# Patient Record
Sex: MALE | Race: WHITE | Employment: OTHER | URBAN - METROPOLITAN AREA
[De-identification: names, ages, dates, MRNs, and addresses within clinical notes are randomized per-mention and may not be internally consistent; named-entity substitution may affect disease eponyms.]

---

## 2018-09-03 ENCOUNTER — APPOINTMENT (OUTPATIENT)
Dept: CT IMAGING | Age: 68
End: 2018-09-03
Payer: COMMERCIAL

## 2018-09-03 ENCOUNTER — APPOINTMENT (OUTPATIENT)
Dept: GENERAL RADIOLOGY | Age: 68
End: 2018-09-03
Payer: COMMERCIAL

## 2018-09-03 ENCOUNTER — HOSPITAL ENCOUNTER (EMERGENCY)
Age: 68
Discharge: HOME | End: 2018-09-03
Attending: EMERGENCY MEDICINE
Payer: COMMERCIAL

## 2018-09-03 VITALS
HEART RATE: 73 BPM | HEIGHT: 71 IN | SYSTOLIC BLOOD PRESSURE: 150 MMHG | WEIGHT: 220 LBS | BODY MASS INDEX: 30.8 KG/M2 | OXYGEN SATURATION: 95 % | TEMPERATURE: 98.2 F | DIASTOLIC BLOOD PRESSURE: 94 MMHG | RESPIRATION RATE: 16 BRPM

## 2018-09-03 DIAGNOSIS — R55 SYNCOPE AND COLLAPSE: Primary | ICD-10-CM

## 2018-09-03 LAB
A/G RATIO: 1.2 (ref 1.1–2.2)
ALBUMIN SERPL-MCNC: 3.8 G/DL (ref 3.4–5)
ALP BLD-CCNC: 98 U/L (ref 40–129)
ALT SERPL-CCNC: 19 U/L (ref 10–40)
ANION GAP SERPL CALCULATED.3IONS-SCNC: 11 MMOL/L (ref 3–16)
AST SERPL-CCNC: 17 U/L (ref 15–37)
BASOPHILS ABSOLUTE: 0.1 K/UL (ref 0–0.2)
BASOPHILS RELATIVE PERCENT: 1 %
BILIRUB SERPL-MCNC: 0.9 MG/DL (ref 0–1)
BUN BLDV-MCNC: 10 MG/DL (ref 7–20)
CALCIUM SERPL-MCNC: 9.3 MG/DL (ref 8.3–10.6)
CHLORIDE BLD-SCNC: 101 MMOL/L (ref 99–110)
CO2: 28 MMOL/L (ref 21–32)
CREAT SERPL-MCNC: 0.9 MG/DL (ref 0.8–1.3)
EOSINOPHILS ABSOLUTE: 0.2 K/UL (ref 0–0.6)
EOSINOPHILS RELATIVE PERCENT: 1.8 %
GFR AFRICAN AMERICAN: >60
GFR NON-AFRICAN AMERICAN: >60
GLOBULIN: 3.2 G/DL
GLUCOSE BLD-MCNC: 171 MG/DL (ref 70–99)
HCT VFR BLD CALC: 39 % (ref 40.5–52.5)
HEMOGLOBIN: 13.9 G/DL (ref 13.5–17.5)
LYMPHOCYTES ABSOLUTE: 2.3 K/UL (ref 1–5.1)
LYMPHOCYTES RELATIVE PERCENT: 20 %
MCH RBC QN AUTO: 30.8 PG (ref 26–34)
MCHC RBC AUTO-ENTMCNC: 35.5 G/DL (ref 31–36)
MCV RBC AUTO: 86.8 FL (ref 80–100)
MONOCYTES ABSOLUTE: 1.1 K/UL (ref 0–1.3)
MONOCYTES RELATIVE PERCENT: 9.8 %
NEUTROPHILS ABSOLUTE: 7.7 K/UL (ref 1.7–7.7)
NEUTROPHILS RELATIVE PERCENT: 67.4 %
PDW BLD-RTO: 13 % (ref 12.4–15.4)
PLATELET # BLD: 184 K/UL (ref 135–450)
PMV BLD AUTO: 11.2 FL (ref 5–10.5)
POTASSIUM SERPL-SCNC: 3.6 MMOL/L (ref 3.5–5.1)
RBC # BLD: 4.5 M/UL (ref 4.2–5.9)
SODIUM BLD-SCNC: 140 MMOL/L (ref 136–145)
TOTAL PROTEIN: 7 G/DL (ref 6.4–8.2)
TROPONIN: <0.01 NG/ML
WBC # BLD: 11.4 K/UL (ref 4–11)

## 2018-09-03 PROCEDURE — 99284 EMERGENCY DEPT VISIT MOD MDM: CPT

## 2018-09-03 PROCEDURE — 96360 HYDRATION IV INFUSION INIT: CPT

## 2018-09-03 PROCEDURE — 70450 CT HEAD/BRAIN W/O DYE: CPT

## 2018-09-03 PROCEDURE — 80053 COMPREHEN METABOLIC PANEL: CPT

## 2018-09-03 PROCEDURE — 2580000003 HC RX 258: Performed by: PHYSICIAN ASSISTANT

## 2018-09-03 PROCEDURE — 85025 COMPLETE CBC W/AUTO DIFF WBC: CPT

## 2018-09-03 PROCEDURE — 72125 CT NECK SPINE W/O DYE: CPT

## 2018-09-03 PROCEDURE — 93005 ELECTROCARDIOGRAM TRACING: CPT | Performed by: EMERGENCY MEDICINE

## 2018-09-03 PROCEDURE — 84484 ASSAY OF TROPONIN QUANT: CPT

## 2018-09-03 PROCEDURE — 71045 X-RAY EXAM CHEST 1 VIEW: CPT

## 2018-09-03 RX ORDER — PANTOPRAZOLE SODIUM 40 MG/1
TABLET, DELAYED RELEASE ORAL
Status: ON HOLD | COMMUNITY
Start: 2018-05-14 | End: 2021-09-11 | Stop reason: SDUPTHER

## 2018-09-03 RX ORDER — ASPIRIN 81 MG/1
81 TABLET ORAL
COMMUNITY

## 2018-09-03 RX ORDER — BUDESONIDE AND FORMOTEROL FUMARATE DIHYDRATE 80; 4.5 UG/1; UG/1
1 AEROSOL RESPIRATORY (INHALATION)
COMMUNITY
Start: 2018-04-05

## 2018-09-03 RX ORDER — ATORVASTATIN CALCIUM 40 MG/1
TABLET, FILM COATED ORAL
COMMUNITY
Start: 2018-07-26

## 2018-09-03 RX ORDER — 0.9 % SODIUM CHLORIDE 0.9 %
1000 INTRAVENOUS SOLUTION INTRAVENOUS ONCE
Status: COMPLETED | OUTPATIENT
Start: 2018-09-03 | End: 2018-09-03

## 2018-09-03 RX ORDER — DULOXETIN HYDROCHLORIDE 60 MG/1
CAPSULE, DELAYED RELEASE ORAL
COMMUNITY
Start: 2018-06-18

## 2018-09-03 RX ORDER — CLOPIDOGREL BISULFATE 75 MG/1
TABLET ORAL
COMMUNITY
Start: 2018-08-20

## 2018-09-03 RX ADMIN — SODIUM CHLORIDE 1000 ML: 9 INJECTION, SOLUTION INTRAVENOUS at 19:21

## 2018-09-03 NOTE — ED PROVIDER NOTES
Emergency Department Provider Note     Location: [facility]  ED  9/3/2018    I independently performed a history and physical on Coby Richardson. All diagnostic, treatment, and disposition decisions were made by myself in conjunction with the mid-level provider. Briefly, this is a 79 y.o. male here for LOC. Patient got out of bed, opened a door to ask his wife to let the dog out. He then closed the door and next thing he knows, he was on the floor with wife/son by his side. Wife said after the patient closed the door, she heard a thud. She immediately rushed in and found the patient on the floor unresponsive. She shook the patient to wake him up, ran to get her son to help her and when they returned within a few seconds, the patient was already sitting up on the floor but still acting somewhat confused. He was also noted to be pale, cool, and clammy. Denies HA, vision changes. No N/V. Patient refused to come initially. However, he remained tired throughout the day and wife convinced him to come. ED Triage Vitals [09/03/18 1622]   BP Temp Temp Source Pulse Resp SpO2 Height Weight   133/83 99.1 °F (37.3 °C) Oral 98 20 94 % 5' 11\" (1.803 m) 220 lb (99.8 kg)        Exam showed WDWN M, NAD, ACOx3, heart RRR, lungs clear, abd soft. Orthostatic VS obtained. Supine /91, HR 77. Sitting /93, HR 92. Standing /87, HR 95    Fluids ordered. EKG  The Ekg interpreted by me in the absence of a cardiologist shows. normal sinus rhythm with a rate of 81  Axis is   Normal  QTc is  normal  Intervals and Durations are unremarkable. No specific ST-T wave changes appreciated. No evidence of acute ischemia. No significant change from prior EKG dated 2/22/12     Lab reviewed - nonspecific elevated WBC at 11.4. Glucose 171. No acute abnormality that needs to be addressed. Imaging reviewed. 1. No acute abnormalities seen in the brain or cervical spine   2.  Focal area of

## 2018-09-03 NOTE — ED PROVIDER NOTES
History reviewed. No pertinent family history. Social History     Social History    Marital status:      Spouse name: N/A    Number of children: N/A    Years of education: N/A     Occupational History    Not on file. Social History Main Topics    Smoking status: Current Every Day Smoker     Packs/day: 0.50     Types: Cigarettes    Smokeless tobacco: Never Used    Alcohol use No    Drug use: No    Sexual activity: Not on file     Other Topics Concern    Not on file     Social History Narrative    No narrative on file     No current facility-administered medications for this encounter. Current Outpatient Prescriptions   Medication Sig Dispense Refill    aspirin 81 MG EC tablet Take 81 mg by mouth      atorvastatin (LIPITOR) 40 MG tablet TAKE 1 TABLET BY MOUTH DAILY.  clopidogrel (PLAVIX) 75 MG tablet TAKE 1 TAB BY MOUTH DAILY.  budesonide-formoterol (SYMBICORT) 80-4.5 MCG/ACT AERO Inhale 1 puff into the lungs      DULoxetine (CYMBALTA) 60 MG extended release capsule TAKE ONE CAPSULE BY MOUTH EVERY DAY      Multiple Vitamins-Minerals (MULTIVITAMIN ADULT EXTRA C PO) Take by mouth      pantoprazole (PROTONIX) 40 MG tablet TAKE 1 TAB BY MOUTH DAILY.  naproxen (NAPROSYN) 500 MG tablet Take 1 tablet by mouth 2 times daily. 20 tablet 0     No Known Allergies    REVIEW OF SYSTEMS  10 systems reviewed, pertinent positives per HPI otherwise noted to be negative    PHYSICAL EXAM  /83   Pulse 98   Temp 99.1 °F (37.3 °C) (Oral)   Resp 20   Ht 5' 11\" (1.803 m)   Wt 220 lb (99.8 kg)   SpO2 94%   BMI 30.68 kg/m²   GENERAL APPEARANCE: Awake and alert. Cooperative. No acute distress. HEAD: Normocephalic. Atraumatic. No hematomas, lesions, lacerations or bruises. Negative for gilman signs or raccoons eyes. EYES: PERRL. EOM's grossly intact. Conjunctiva clear without exudate. No periorbital edema or erythema. No proptosis. No globe tenderness.   No blood noted to Type of Exam: Initial; ORDERING SYSTEM PROVIDED HISTORY: loc TECHNOLOGIST PROVIDED HISTORY: Ordering Physician Provided Reason for Exam: passed out Acuity: Acute Type of Exam: Initial FINDINGS: BRAIN/VENTRICLES: Focal low-attenuation seen superiorly in the left frontal lobe. The left frontal horn is enlarged and this most likely represents an area of encephalomalacia. No acute intracranial hemorrhage. The ventricles and sulci otherwise are mildly enlarged throughout. ORBITS: The visualized portion of the orbits demonstrate no acute abnormality. SINUSES: The visualized paranasal sinuses and mastoid air cells demonstrate no acute abnormality. SOFT TISSUES/SKULL:  No acute abnormality of the visualized skull or soft tissues. Cervical spine: No fracture identified. Multilevel degenerative changes. Anatomic alignment. No surrounding soft tissue abnormality. 1. No acute abnormalities seen in the brain or cervical spine 2. 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 3. Multilevel degenerative changes seen in the cervical spine     Ct Cervical Spine Wo Contrast    Result Date: 9/3/2018  EXAMINATION: CT OF THE HEAD WITHOUT CONTRAST; CT OF THE CERVICAL SPINE WITHOUT CONTRAST 9/3/2018 5:13 pm; 9/3/2018 5:12 pm TECHNIQUE: CT of the head was performed without the administration of intravenous contrast. Dose modulation, iterative reconstruction, and/or weight based adjustment of the mA/kV was utilized to reduce the radiation dose to as low as reasonably achievable.; CT of the cervical spine was performed without the administration of intravenous contrast. Multiplanar reformatted images are provided for review. Dose modulation, iterative reconstruction, and/or weight based adjustment of the mA/kV was utilized to reduce the radiation dose to as low as reasonably achievable. COMPARISON: None. Neutrophils # 7.7 1.7 - 7.7 K/uL    Lymphocytes # 2.3 1.0 - 5.1 K/uL    Monocytes # 1.1 0.0 - 1.3 K/uL    Eosinophils # 0.2 0.0 - 0.6 K/uL    Basophils # 0.1 0.0 - 0.2 K/uL   Comprehensive metabolic panel   Result Value Ref Range    Sodium 140 136 - 145 mmol/L    Potassium 3.6 3.5 - 5.1 mmol/L    Chloride 101 99 - 110 mmol/L    CO2 28 21 - 32 mmol/L    Anion Gap 11 3 - 16    Glucose 171 (H) 70 - 99 mg/dL    BUN 10 7 - 20 mg/dL    CREATININE 0.9 0.8 - 1.3 mg/dL    GFR Non-African American >60 >60    GFR African American >60 >60    Calcium 9.3 8.3 - 10.6 mg/dL    Total Protein 7.0 6.4 - 8.2 g/dL    Alb 3.8 3.4 - 5.0 g/dL    Albumin/Globulin Ratio 1.2 1.1 - 2.2    Total Bilirubin 0.9 0.0 - 1.0 mg/dL    Alkaline Phosphatase 98 40 - 129 U/L    ALT 19 10 - 40 U/L    AST 17 15 - 37 U/L    Globulin 3.2 g/dL   Troponin   Result Value Ref Range    Troponin <0.01 <0.01 ng/mL   EKG 12 Lead   Result Value Ref Range    Ventricular Rate 81 BPM    Atrial Rate 81 BPM    P-R Interval 162 ms    QRS Duration 94 ms    Q-T Interval 370 ms    QTc Calculation (Bazett) 429 ms    P Axis 54 degrees    R Axis 9 degrees    T Axis 49 degrees    Diagnosis       Normal sinus rhythmNormal ECGWhen compared with ECG of 22-FEB-2012 17:48,No significant change was found     I estimate there is LOW risk for ACUTE CORONARY SYNDROME, INTRACRANIAL HEMORRHAGE, MALIGNANT DYSRHYTHMIA, MENINGITIS, PNEUMONIA, PULMONARY EMBOLISM, SEPSIS, SUBARACHNOID HEMORRHAGE, SUBDURAL HEMATOMA, STROKE, or URINARY TRACT INFECTION, thus I consider the discharge disposition reasonable. Elizabeth Yoo and I have discussed the diagnosis and risks, and we agree with discharging home to follow-up with their primary doctor. We also discussed returning to the Emergency Department immediately if new or worsening symptoms occur.  We have discussed the symptoms which are most concerning (e.g., changing or worsening pain, weakness, vomiting, fever) that necessitate immediate return. Final Impression  1. Syncope and collapse      Blood pressure (!) 140/82, pulse 82, temperature 99.1 °F (37.3 °C), temperature source Oral, resp. rate 18, height 5' 11\" (1.803 m), weight 220 lb (99.8 kg), SpO2 95 %.     DISPOSITION  Patient was discharged to home in good condition.  ;       Gracia Schaeffer  09/03/18 2017

## 2018-09-04 LAB
EKG ATRIAL RATE: 81 BPM
EKG DIAGNOSIS: NORMAL
EKG P AXIS: 54 DEGREES
EKG P-R INTERVAL: 162 MS
EKG Q-T INTERVAL: 370 MS
EKG QRS DURATION: 94 MS
EKG QTC CALCULATION (BAZETT): 429 MS
EKG R AXIS: 9 DEGREES
EKG T AXIS: 49 DEGREES
EKG VENTRICULAR RATE: 81 BPM

## 2018-09-04 PROCEDURE — 93010 ELECTROCARDIOGRAM REPORT: CPT | Performed by: INTERNAL MEDICINE

## 2019-08-22 ENCOUNTER — HOSPITAL ENCOUNTER (EMERGENCY)
Age: 69
Discharge: HOME | End: 2019-08-22
Attending: EMERGENCY MEDICINE
Payer: MEDICARE

## 2019-08-22 ENCOUNTER — APPOINTMENT (OUTPATIENT)
Dept: GENERAL RADIOLOGY | Age: 69
End: 2019-08-22
Payer: MEDICARE

## 2019-08-22 VITALS
OXYGEN SATURATION: 98 % | HEART RATE: 86 BPM | SYSTOLIC BLOOD PRESSURE: 106 MMHG | DIASTOLIC BLOOD PRESSURE: 78 MMHG | BODY MASS INDEX: 33.6 KG/M2 | HEIGHT: 71 IN | WEIGHT: 240 LBS | TEMPERATURE: 98.3 F | RESPIRATION RATE: 16 BRPM

## 2019-08-22 DIAGNOSIS — R42 ORTHOSTATIC DIZZINESS: Primary | ICD-10-CM

## 2019-08-22 LAB
A/G RATIO: 1.1 (ref 1.1–2.2)
ALBUMIN SERPL-MCNC: 3.9 G/DL (ref 3.4–5)
ALP BLD-CCNC: 94 U/L (ref 40–129)
ALT SERPL-CCNC: 17 U/L (ref 10–40)
ANION GAP SERPL CALCULATED.3IONS-SCNC: 11 MMOL/L (ref 3–16)
AST SERPL-CCNC: 22 U/L (ref 15–37)
BASOPHILS ABSOLUTE: 0.1 K/UL (ref 0–0.2)
BASOPHILS RELATIVE PERCENT: 0.7 %
BILIRUB SERPL-MCNC: 1.1 MG/DL (ref 0–1)
BILIRUBIN URINE: NEGATIVE
BLOOD, URINE: NEGATIVE
BUN BLDV-MCNC: 10 MG/DL (ref 7–20)
CALCIUM SERPL-MCNC: 9.1 MG/DL (ref 8.3–10.6)
CHLORIDE BLD-SCNC: 104 MMOL/L (ref 99–110)
CLARITY: CLEAR
CO2: 26 MMOL/L (ref 21–32)
COLOR: YELLOW
CREAT SERPL-MCNC: 1.2 MG/DL (ref 0.8–1.3)
EKG ATRIAL RATE: 87 BPM
EKG DIAGNOSIS: NORMAL
EKG P AXIS: 58 DEGREES
EKG P-R INTERVAL: 162 MS
EKG Q-T INTERVAL: 376 MS
EKG QRS DURATION: 94 MS
EKG QTC CALCULATION (BAZETT): 452 MS
EKG R AXIS: 13 DEGREES
EKG T AXIS: 47 DEGREES
EKG VENTRICULAR RATE: 87 BPM
EOSINOPHILS ABSOLUTE: 0.1 K/UL (ref 0–0.6)
EOSINOPHILS RELATIVE PERCENT: 0.9 %
GFR AFRICAN AMERICAN: >60
GFR NON-AFRICAN AMERICAN: >60
GLOBULIN: 3.4 G/DL
GLUCOSE BLD-MCNC: 198 MG/DL (ref 70–99)
GLUCOSE URINE: 250 MG/DL
HCT VFR BLD CALC: 39.9 % (ref 40.5–52.5)
HEMOGLOBIN: 13.7 G/DL (ref 13.5–17.5)
KETONES, URINE: NEGATIVE MG/DL
LEUKOCYTE ESTERASE, URINE: NEGATIVE
LYMPHOCYTES ABSOLUTE: 1.4 K/UL (ref 1–5.1)
LYMPHOCYTES RELATIVE PERCENT: 10.3 %
MCH RBC QN AUTO: 29.9 PG (ref 26–34)
MCHC RBC AUTO-ENTMCNC: 34.4 G/DL (ref 31–36)
MCV RBC AUTO: 87.1 FL (ref 80–100)
MICROSCOPIC EXAMINATION: ABNORMAL
MONOCYTES ABSOLUTE: 0.9 K/UL (ref 0–1.3)
MONOCYTES RELATIVE PERCENT: 6.3 %
NEUTROPHILS ABSOLUTE: 11.4 K/UL (ref 1.7–7.7)
NEUTROPHILS RELATIVE PERCENT: 81.8 %
NITRITE, URINE: NEGATIVE
PDW BLD-RTO: 13.3 % (ref 12.4–15.4)
PH UA: 6 (ref 5–8)
PLATELET # BLD: 179 K/UL (ref 135–450)
PMV BLD AUTO: 11.6 FL (ref 5–10.5)
POTASSIUM SERPL-SCNC: 3.9 MMOL/L (ref 3.5–5.1)
PROTEIN UA: NEGATIVE MG/DL
RBC # BLD: 4.58 M/UL (ref 4.2–5.9)
SODIUM BLD-SCNC: 141 MMOL/L (ref 136–145)
SPECIFIC GRAVITY UA: 1.02 (ref 1–1.03)
TOTAL PROTEIN: 7.3 G/DL (ref 6.4–8.2)
TROPONIN: <0.01 NG/ML
URINE REFLEX TO CULTURE: ABNORMAL
URINE TYPE: ABNORMAL
UROBILINOGEN, URINE: 2 E.U./DL
WBC # BLD: 13.9 K/UL (ref 4–11)

## 2019-08-22 PROCEDURE — 93010 ELECTROCARDIOGRAM REPORT: CPT | Performed by: INTERNAL MEDICINE

## 2019-08-22 PROCEDURE — 2580000003 HC RX 258: Performed by: PHYSICIAN ASSISTANT

## 2019-08-22 PROCEDURE — 99284 EMERGENCY DEPT VISIT MOD MDM: CPT

## 2019-08-22 PROCEDURE — 96361 HYDRATE IV INFUSION ADD-ON: CPT

## 2019-08-22 PROCEDURE — 96374 THER/PROPH/DIAG INJ IV PUSH: CPT

## 2019-08-22 PROCEDURE — 93005 ELECTROCARDIOGRAM TRACING: CPT | Performed by: EMERGENCY MEDICINE

## 2019-08-22 PROCEDURE — 85025 COMPLETE CBC W/AUTO DIFF WBC: CPT

## 2019-08-22 PROCEDURE — 84484 ASSAY OF TROPONIN QUANT: CPT

## 2019-08-22 PROCEDURE — 81003 URINALYSIS AUTO W/O SCOPE: CPT

## 2019-08-22 PROCEDURE — 6360000002 HC RX W HCPCS: Performed by: PHYSICIAN ASSISTANT

## 2019-08-22 PROCEDURE — 80053 COMPREHEN METABOLIC PANEL: CPT

## 2019-08-22 PROCEDURE — 71046 X-RAY EXAM CHEST 2 VIEWS: CPT

## 2019-08-22 RX ORDER — ONDANSETRON 2 MG/ML
4 INJECTION INTRAMUSCULAR; INTRAVENOUS EVERY 6 HOURS PRN
Status: DISCONTINUED | OUTPATIENT
Start: 2019-08-22 | End: 2019-08-22 | Stop reason: HOSPADM

## 2019-08-22 RX ORDER — 0.9 % SODIUM CHLORIDE 0.9 %
1000 INTRAVENOUS SOLUTION INTRAVENOUS ONCE
Status: COMPLETED | OUTPATIENT
Start: 2019-08-22 | End: 2019-08-22

## 2019-08-22 RX ORDER — ONDANSETRON 4 MG/1
4 TABLET, FILM COATED ORAL EVERY 8 HOURS PRN
Qty: 20 TABLET | Refills: 0 | Status: SHIPPED | OUTPATIENT
Start: 2019-08-22

## 2019-08-22 RX ADMIN — SODIUM CHLORIDE 1000 ML: 9 INJECTION, SOLUTION INTRAVENOUS at 17:30

## 2019-08-22 RX ADMIN — ONDANSETRON 4 MG: 2 INJECTION INTRAMUSCULAR; INTRAVENOUS at 17:30

## 2019-08-22 ASSESSMENT — ENCOUNTER SYMPTOMS
NAUSEA: 1
RESPIRATORY NEGATIVE: 1

## 2019-08-22 NOTE — ED PROVIDER NOTES
[address] ED  EMERGENCY DEPARTMENT ENCOUNTER        Pt Name: Kailyn Ramires  MRN: [id number]  [place] 1950  Date of evaluation: 8/22/2019  Provider: Mayela Cooper PA-C  PCP: [address]    This patient was seen and evaluated by the attending physician Dr. Alex Johnston. CHIEF COMPLAINT       Chief Complaint   Patient presents with    Dizziness     pt states had an episode of nausea dizziness and sweating that lasted about an hour, currently feeling a little lightheaded       HISTORY OF PRESENT ILLNESS   (Location/Symptom, Timing/Onset, Context/Setting, Quality, Duration, Modifying Factors, Severity)  Note limiting factors. Kailyn Ramires is a 81 y.o. male brought in by private vehicle for complaints of dizziness which he describes as feeling lightheaded. He also admits to nausea and diaphoresis. Onset was earlier this afternoon. Symptoms were intermittent. Denies any chest pain or shortness of breath. He states since he has arrived to the ER symptoms have resolved. No other aggravating or alleviating symptoms. Patient denies any other complaints at this time. Denies any numbness or tingling to his lower extremities. Denies fevers chills or abdominal pain. He has not tried anything at this time for symptomatic relief. Nursing Notes were all reviewed and agreed with or any disagreements were addressed  in the HPI. REVIEW OF SYSTEMS    (2-9 systems for level 4, 10 or more for level 5)     Review of Systems   Constitutional: Negative. HENT: Negative. Respiratory: Negative. Cardiovascular: Negative. Gastrointestinal: Positive for nausea. Genitourinary: Negative. Musculoskeletal: Negative. Skin: Negative. Neurological: Positive for dizziness and light-headedness. Positives and Pertinent negatives as per HPI. Except as noted abovein the ROS, all other systems were reviewed and negative.        PAST MEDICAL HISTORY     Past Laboratory  Genevieve Friedman,  [place], [facility]   Phone [phone]   TROPONIN    Narrative:     Performed at:  [facility]) - [facility]  [address]evieve Friedman,  [place]   Phone [phone]       All other labs were within normal range or not returned as of this dictation. EKG: All EKG's are interpreted by the Emergency Department Physician in the absence of a cardiologist.  Please see their note for interpretation of EKG. RADIOLOGY:   Non-plain film images such as CT, Ultrasound and MRI are read by the radiologist. Plain radiographic images are visualized andpreliminarily interpreted by the  ED Provider with the below findings:        Interpretation [facility] Radiologist below, if available at the time of this note:    XR CHEST STANDARD (2 VW)   Final Result   No evidence of acute cardiopulmonary disease. No results found. PROCEDURES   Unless otherwise noted below, none     Procedures    CRITICAL CARE TIME   N/A    CONSULTS:  None      EMERGENCY DEPARTMENT COURSE and DIFFERENTIAL DIAGNOSIS/MDM:   Vitals:    Vitals:    08/22/19 1613 08/22/19 1656 08/22/19 1826   BP: 133/83  106/78   Pulse: 92  86   Resp: 16 16 16   Temp: 98.3 °F (36.8 °C)     TempSrc: Temporal     SpO2: 98%  98%   Weight: 240 lb (108.9 kg)     Height: 5' 11\" (1.803 m)         Patient was given thefollowing medications:  Medications   ondansetron (ZOFRAN) injection 4 mg (4 mg Intravenous Given 8/22/19 1730)   0.9 % sodium chloride bolus (0 mLs Intravenous Stopped 8/22/19 1919)       Patient brought in for evaluation of dizziness which he described as feeling lightheaded. He also admits to some nausea and diaphoresis. He denies any chest pain. On exam he is alert oriented afebrile breathing on room air satting at 98%. Appears nontoxic no respiratory distress. Old labs and records reviewed. No electrolyte abnormalities. Patient did have a white count of 13.9.   Troponin was less than

## 2019-08-22 NOTE — ED TRIAGE NOTES
Chief Complaint   Patient presents with    Dizziness     pt states had an episode of nausea dizziness and sweating that lasted about an hour, currently feeling a little lightheaded

## 2020-01-21 ENCOUNTER — HOSPITAL ENCOUNTER (EMERGENCY)
Age: 70
Discharge: HOME | End: 2020-01-21
Attending: EMERGENCY MEDICINE
Payer: MEDICARE

## 2020-01-21 VITALS
BODY MASS INDEX: 31.5 KG/M2 | OXYGEN SATURATION: 100 % | WEIGHT: 220 LBS | TEMPERATURE: 97.7 F | DIASTOLIC BLOOD PRESSURE: 64 MMHG | HEART RATE: 90 BPM | RESPIRATION RATE: 14 BRPM | HEIGHT: 70 IN | SYSTOLIC BLOOD PRESSURE: 124 MMHG

## 2020-01-21 LAB
A/G RATIO: 1.5 (ref 1.1–2.2)
ALBUMIN SERPL-MCNC: 4 G/DL (ref 3.4–5)
ALP BLD-CCNC: 86 U/L (ref 40–129)
ALT SERPL-CCNC: 15 U/L (ref 10–40)
ANION GAP SERPL CALCULATED.3IONS-SCNC: 10 MMOL/L (ref 3–16)
AST SERPL-CCNC: 17 U/L (ref 15–37)
BASOPHILS ABSOLUTE: 0.1 K/UL (ref 0–0.2)
BASOPHILS RELATIVE PERCENT: 0.6 %
BILIRUB SERPL-MCNC: 0.4 MG/DL (ref 0–1)
BILIRUBIN URINE: NEGATIVE
BLOOD, URINE: ABNORMAL
BUN BLDV-MCNC: 15 MG/DL (ref 7–20)
CALCIUM SERPL-MCNC: 8.8 MG/DL (ref 8.3–10.6)
CHLORIDE BLD-SCNC: 102 MMOL/L (ref 99–110)
CLARITY: ABNORMAL
CO2: 26 MMOL/L (ref 21–32)
COLOR: YELLOW
CREAT SERPL-MCNC: 1.2 MG/DL (ref 0.8–1.3)
EOSINOPHILS ABSOLUTE: 0 K/UL (ref 0–0.6)
EOSINOPHILS RELATIVE PERCENT: 0.5 %
GFR AFRICAN AMERICAN: >60
GFR NON-AFRICAN AMERICAN: >60
GLOBULIN: 2.7 G/DL
GLUCOSE BLD-MCNC: 198 MG/DL (ref 70–99)
GLUCOSE URINE: 500 MG/DL
HCT VFR BLD CALC: 32.6 % (ref 40.5–52.5)
HEMOGLOBIN: 10.8 G/DL (ref 13.5–17.5)
KETONES, URINE: NEGATIVE MG/DL
LEUKOCYTE ESTERASE, URINE: NEGATIVE
LIPASE: 37 U/L (ref 13–60)
LYMPHOCYTES ABSOLUTE: 1 K/UL (ref 1–5.1)
LYMPHOCYTES RELATIVE PERCENT: 9.9 %
MCH RBC QN AUTO: 28.3 PG (ref 26–34)
MCHC RBC AUTO-ENTMCNC: 33.2 G/DL (ref 31–36)
MCV RBC AUTO: 85.1 FL (ref 80–100)
MICROSCOPIC EXAMINATION: YES
MONOCYTES ABSOLUTE: 0.5 K/UL (ref 0–1.3)
MONOCYTES RELATIVE PERCENT: 5.4 %
NEUTROPHILS ABSOLUTE: 8.3 K/UL (ref 1.7–7.7)
NEUTROPHILS RELATIVE PERCENT: 83.6 %
NITRITE, URINE: NEGATIVE
PDW BLD-RTO: 13.5 % (ref 12.4–15.4)
PH UA: 6.5 (ref 5–8)
PLATELET # BLD: 182 K/UL (ref 135–450)
PMV BLD AUTO: 11.5 FL (ref 5–10.5)
POTASSIUM SERPL-SCNC: 4.3 MMOL/L (ref 3.5–5.1)
PROTEIN UA: NEGATIVE MG/DL
RBC # BLD: 3.83 M/UL (ref 4.2–5.9)
RBC UA: >100 /HPF (ref 0–2)
SODIUM BLD-SCNC: 138 MMOL/L (ref 136–145)
SPECIFIC GRAVITY UA: 1.01 (ref 1–1.03)
TOTAL PROTEIN: 6.7 G/DL (ref 6.4–8.2)
URINE REFLEX TO CULTURE: ABNORMAL
URINE TYPE: ABNORMAL
UROBILINOGEN, URINE: 1 E.U./DL
WBC # BLD: 9.9 K/UL (ref 4–11)
WBC UA: ABNORMAL /HPF (ref 0–5)

## 2020-01-21 PROCEDURE — 81001 URINALYSIS AUTO W/SCOPE: CPT

## 2020-01-21 PROCEDURE — 2580000003 HC RX 258: Performed by: EMERGENCY MEDICINE

## 2020-01-21 PROCEDURE — 99284 EMERGENCY DEPT VISIT MOD MDM: CPT

## 2020-01-21 PROCEDURE — 85025 COMPLETE CBC W/AUTO DIFF WBC: CPT

## 2020-01-21 PROCEDURE — 80053 COMPREHEN METABOLIC PANEL: CPT

## 2020-01-21 PROCEDURE — 83690 ASSAY OF LIPASE: CPT

## 2020-01-21 RX ORDER — 0.9 % SODIUM CHLORIDE 0.9 %
1000 INTRAVENOUS SOLUTION INTRAVENOUS ONCE
Status: COMPLETED | OUTPATIENT
Start: 2020-01-21 | End: 2020-01-21

## 2020-01-21 RX ADMIN — SODIUM CHLORIDE 1000 ML: 9 INJECTION, SOLUTION INTRAVENOUS at 10:33

## 2020-01-21 ASSESSMENT — ENCOUNTER SYMPTOMS
HEMATOCHEZIA: 0
ABDOMINAL PAIN: 1
DIARRHEA: 1
BLOOD IN STOOL: 0
SHORTNESS OF BREATH: 0
NAUSEA: 1
VOICE CHANGE: 0
HEMATEMESIS: 0
TROUBLE SWALLOWING: 0
WHEEZING: 0
BACK PAIN: 0
STRIDOR: 0
VOMITING: 1
FACIAL SWELLING: 0
COLOR CHANGE: 0
PHOTOPHOBIA: 0

## 2020-01-21 NOTE — ED NOTES
Discharge instructions reviewed with pt. He verbalized understanding. Pt ambulated out of ED in stable condition.       [facility]uman[facility] [address]  01/21/20 1213

## 2020-01-21 NOTE — ED PROVIDER NOTES
Day Smoker     Packs/day: 0.50     Types: Cigarettes    Smokeless tobacco: Never Used   Substance and Sexual Activity    Alcohol use: Yes     Comment: occ     Drug use: No    Sexual activity: None   Lifestyle    Physical activity:     Days per week: None     Minutes per session: None    Stress: None   Relationships    Social connections:     Talks on phone: None     Gets together: None     Attends Anabaptist service: None     Active member of club or organization: None     Attends meetings of clubs or organizations: None     Relationship status: None    Intimate partner violence:     Fear of current or ex partner: None     Emotionally abused: None     Physically abused: None     Forced sexual activity: None   Other Topics Concern    None   Social History Narrative    None         PHYSICAL EXAM    (up to 7 for level 4, 8 or more for level 5)     ED Triage Vitals   BP Temp Temp Source Pulse Resp SpO2 Height Weight   01/21/20 0942 01/21/20 0942 01/21/20 0942 01/21/20 0942 01/21/20 0942 01/21/20 0942 01/21/20 0941 01/21/20 0941   (!) 140/84 97.7 °F (36.5 °C) Oral 81 14 99 % 5' 10\" (1.778 m) 220 lb (99.8 kg)       Physical Exam  Vitals signs and nursing note reviewed. Constitutional:       General: He is not in acute distress. Appearance: He is well-developed. HENT:      Head: Normocephalic and atraumatic. Right Ear: External ear normal.      Left Ear: External ear normal.   Eyes:      Conjunctiva/sclera: Conjunctivae normal.   Neck:      Musculoskeletal: Neck supple. Vascular: No JVD. Trachea: No tracheal deviation. Cardiovascular:      Rate and Rhythm: Normal rate. Pulmonary:      Effort: Pulmonary effort is normal. No respiratory distress. Breath sounds: Normal breath sounds. No wheezing. Abdominal:      General: Abdomen is flat. Bowel sounds are normal. There is no distension. Palpations: Abdomen is soft. Tenderness: There is no tenderness.  There is no right CVA tenderness, guarding or rebound. Comments: No abdominal tenderness to palpation, no CVA tenderness to percussion. Musculoskeletal: Normal range of motion. General: No tenderness. Skin:     General: Skin is warm and dry. Neurological:      Mental Status: He is alert. Cranial Nerves: No cranial nerve deficit. DIAGNOSTIC RESULTS       LABS:  Labs Reviewed   CBC WITH AUTO DIFFERENTIAL - Abnormal; Notable for the following components:       Result Value    RBC 3.83 (*)     Hemoglobin 10.8 (*)     Hematocrit 32.6 (*)     MPV 11.5 (*)     Neutrophils Absolute 8.3 (*)     All other components within normal limits    Narrative:     Performed at:  [facility],  [place], [place]   Phone [phone]   COMPREHENSIVE METABOLIC PANEL - Abnormal; Notable for the following components:    Glucose 198 (*)     All other components within normal limits    Narrative:     Performed at:  [facility],  [place], [place]   Phone [phone]   URINE RT REFLEX TO CULTURE - Abnormal; Notable for the following components:    Clarity, UA SL CLOUDY (*)     Glucose, Ur 500 (*)     Blood, Urine LARGE (*)     All other components within normal limits    Narrative:     Performed at:  [facility],  [place], [place]   Phone [phone]   MICROSCOPIC URINALYSIS - Abnormal; Notable for the following components:    RBC, UA >100 (*)     All other components within normal limits    Narrative:     Performed at:  [facility],  [place], [place]   Phone [phone]   LIPASE    Narrative:     Performed at:  [facility] - [facility],  [place], [place]   Phone [phone]       All otherlabs were within normal range or not returned as of this dictation.     EMERGENCY Erica    In 3 days      [place] [facility]  [address] [facility]  [phone]    If symptoms worsen        (Please note that portions of this note were completed with a voice recognition program.  Efforts were made to edit the dictations but occasionally words aremis-transcribed. )    Kathryn Sweeney MD (electronically signed)  Attending Emergency Physician       Kathryn Sweeney MD  01/21/20 0956

## 2020-07-10 ENCOUNTER — HOSPITAL ENCOUNTER (OUTPATIENT)
Dept: CT IMAGING | Age: 70
Discharge: HOME | End: 2020-07-10
Payer: MEDICARE

## 2020-07-10 ENCOUNTER — HOSPITAL ENCOUNTER (OUTPATIENT)
Age: 70
Discharge: HOME | End: 2020-07-10
Payer: MEDICARE

## 2020-07-10 LAB
ANION GAP SERPL CALCULATED.3IONS-SCNC: 9 MMOL/L (ref 3–16)
BUN BLDV-MCNC: 15 MG/DL (ref 7–20)
CALCIUM SERPL-MCNC: 9 MG/DL (ref 8.3–10.6)
CHLORIDE BLD-SCNC: 105 MMOL/L (ref 99–110)
CO2: 25 MMOL/L (ref 21–32)
CREAT SERPL-MCNC: 1.1 MG/DL (ref 0.8–1.3)
GFR AFRICAN AMERICAN: >60
GFR NON-AFRICAN AMERICAN: >60
GLUCOSE BLD-MCNC: 195 MG/DL (ref 70–99)
POTASSIUM SERPL-SCNC: 4 MMOL/L (ref 3.5–5.1)
SODIUM BLD-SCNC: 139 MMOL/L (ref 136–145)

## 2020-07-10 PROCEDURE — 71260 CT THORAX DX C+: CPT

## 2020-07-10 PROCEDURE — 6360000004 HC RX CONTRAST MEDICATION: Performed by: INTERNAL MEDICINE

## 2020-07-10 PROCEDURE — 80048 BASIC METABOLIC PNL TOTAL CA: CPT

## 2020-07-10 PROCEDURE — 36415 COLL VENOUS BLD VENIPUNCTURE: CPT

## 2020-07-10 PROCEDURE — 74178 CT ABD&PLV WO CNTR FLWD CNTR: CPT

## 2020-07-10 RX ADMIN — IOHEXOL 50 ML: 240 INJECTION, SOLUTION INTRATHECAL; INTRAVASCULAR; INTRAVENOUS; ORAL at 15:04

## 2020-07-10 RX ADMIN — IOPAMIDOL 75 ML: 755 INJECTION, SOLUTION INTRAVENOUS at 15:05

## 2020-10-16 ENCOUNTER — HOSPITAL ENCOUNTER (EMERGENCY)
Age: 70
Discharge: HOME | End: 2020-10-16
Payer: MEDICARE

## 2020-10-16 VITALS
HEART RATE: 78 BPM | OXYGEN SATURATION: 98 % | DIASTOLIC BLOOD PRESSURE: 70 MMHG | RESPIRATION RATE: 16 BRPM | TEMPERATURE: 98.3 F | SYSTOLIC BLOOD PRESSURE: 142 MMHG

## 2020-10-16 LAB
A/G RATIO: 1.6 (ref 1.1–2.2)
ALBUMIN SERPL-MCNC: 4.4 G/DL (ref 3.4–5)
ALP BLD-CCNC: 107 U/L (ref 40–129)
ALT SERPL-CCNC: 40 U/L (ref 10–40)
ANION GAP SERPL CALCULATED.3IONS-SCNC: 8 MMOL/L (ref 3–16)
AST SERPL-CCNC: 35 U/L (ref 15–37)
BASOPHILS ABSOLUTE: 0 K/UL (ref 0–0.2)
BASOPHILS RELATIVE PERCENT: 0.3 %
BILIRUB SERPL-MCNC: 0.6 MG/DL (ref 0–1)
BILIRUBIN URINE: NEGATIVE
BLOOD, URINE: ABNORMAL
BUN BLDV-MCNC: 12 MG/DL (ref 7–20)
CALCIUM SERPL-MCNC: 9.1 MG/DL (ref 8.3–10.6)
CHLORIDE BLD-SCNC: 102 MMOL/L (ref 99–110)
CLARITY: CLEAR
CO2: 26 MMOL/L (ref 21–32)
COLOR: YELLOW
CREAT SERPL-MCNC: 1.5 MG/DL (ref 0.8–1.3)
CRYSTALS, UA: ABNORMAL /HPF
EOSINOPHILS ABSOLUTE: 0 K/UL (ref 0–0.6)
EOSINOPHILS RELATIVE PERCENT: 0.3 %
GFR AFRICAN AMERICAN: 56
GFR NON-AFRICAN AMERICAN: 46
GLOBULIN: 2.7 G/DL
GLUCOSE BLD-MCNC: 164 MG/DL (ref 70–99)
GLUCOSE URINE: 100 MG/DL
HCT VFR BLD CALC: 33.4 % (ref 40.5–52.5)
HEMOGLOBIN: 11.1 G/DL (ref 13.5–17.5)
HYPOCHROMIA: ABNORMAL
KETONES, URINE: NEGATIVE MG/DL
LEUKOCYTE ESTERASE, URINE: NEGATIVE
LYMPHOCYTES ABSOLUTE: 1.1 K/UL (ref 1–5.1)
LYMPHOCYTES RELATIVE PERCENT: 11.3 %
MAGNESIUM: 1.6 MG/DL (ref 1.8–2.4)
MCH RBC QN AUTO: 26.1 PG (ref 26–34)
MCHC RBC AUTO-ENTMCNC: 33.2 G/DL (ref 31–36)
MCV RBC AUTO: 78.7 FL (ref 80–100)
MICROCYTES: ABNORMAL
MICROSCOPIC EXAMINATION: YES
MONOCYTES ABSOLUTE: 0.7 K/UL (ref 0–1.3)
MONOCYTES RELATIVE PERCENT: 7.1 %
NEUTROPHILS ABSOLUTE: 7.9 K/UL (ref 1.7–7.7)
NEUTROPHILS RELATIVE PERCENT: 81 %
NITRITE, URINE: NEGATIVE
PDW BLD-RTO: 19.8 % (ref 12.4–15.4)
PH UA: 5.5 (ref 5–8)
PLATELET # BLD: 175 K/UL (ref 135–450)
PMV BLD AUTO: 10.5 FL (ref 5–10.5)
POTASSIUM REFLEX MAGNESIUM: 3.4 MMOL/L (ref 3.5–5.1)
PROTEIN UA: NEGATIVE MG/DL
RBC # BLD: 4.24 M/UL (ref 4.2–5.9)
RBC UA: ABNORMAL /HPF (ref 0–4)
SODIUM BLD-SCNC: 136 MMOL/L (ref 136–145)
SPECIFIC GRAVITY UA: 1.01 (ref 1–1.03)
TOTAL PROTEIN: 7.1 G/DL (ref 6.4–8.2)
URINE REFLEX TO CULTURE: ABNORMAL
URINE TYPE: ABNORMAL
UROBILINOGEN, URINE: 0.2 E.U./DL
WBC # BLD: 9.7 K/UL (ref 4–11)
WBC UA: ABNORMAL /HPF (ref 0–5)

## 2020-10-16 PROCEDURE — 96375 TX/PRO/DX INJ NEW DRUG ADDON: CPT

## 2020-10-16 PROCEDURE — 2580000003 HC RX 258: Performed by: NURSE PRACTITIONER

## 2020-10-16 PROCEDURE — 6360000002 HC RX W HCPCS: Performed by: NURSE PRACTITIONER

## 2020-10-16 PROCEDURE — 99283 EMERGENCY DEPT VISIT LOW MDM: CPT

## 2020-10-16 PROCEDURE — 96365 THER/PROPH/DIAG IV INF INIT: CPT

## 2020-10-16 PROCEDURE — 83735 ASSAY OF MAGNESIUM: CPT

## 2020-10-16 PROCEDURE — 81001 URINALYSIS AUTO W/SCOPE: CPT

## 2020-10-16 PROCEDURE — 80053 COMPREHEN METABOLIC PANEL: CPT

## 2020-10-16 PROCEDURE — 6370000000 HC RX 637 (ALT 250 FOR IP): Performed by: NURSE PRACTITIONER

## 2020-10-16 PROCEDURE — 85025 COMPLETE CBC W/AUTO DIFF WBC: CPT

## 2020-10-16 RX ORDER — MAGNESIUM SULFATE 1 G/100ML
1 INJECTION INTRAVENOUS ONCE
Status: COMPLETED | OUTPATIENT
Start: 2020-10-16 | End: 2020-10-16

## 2020-10-16 RX ORDER — 0.9 % SODIUM CHLORIDE 0.9 %
1000 INTRAVENOUS SOLUTION INTRAVENOUS ONCE
Status: COMPLETED | OUTPATIENT
Start: 2020-10-16 | End: 2020-10-16

## 2020-10-16 RX ORDER — TAMSULOSIN HYDROCHLORIDE 0.4 MG/1
0.4 CAPSULE ORAL DAILY
Qty: 5 CAPSULE | Refills: 0 | Status: ON HOLD | OUTPATIENT
Start: 2020-10-16 | End: 2021-09-11 | Stop reason: HOSPADM

## 2020-10-16 RX ORDER — POTASSIUM CHLORIDE 20 MEQ/1
40 TABLET, EXTENDED RELEASE ORAL ONCE
Status: COMPLETED | OUTPATIENT
Start: 2020-10-16 | End: 2020-10-16

## 2020-10-16 RX ORDER — KETOROLAC TROMETHAMINE 30 MG/ML
15 INJECTION, SOLUTION INTRAMUSCULAR; INTRAVENOUS ONCE
Status: COMPLETED | OUTPATIENT
Start: 2020-10-16 | End: 2020-10-16

## 2020-10-16 RX ADMIN — MAGNESIUM SULFATE HEPTAHYDRATE 1 G: 1 INJECTION, SOLUTION INTRAVENOUS at 19:56

## 2020-10-16 RX ADMIN — POTASSIUM CHLORIDE 40 MEQ: 20 TABLET, EXTENDED RELEASE ORAL at 21:07

## 2020-10-16 RX ADMIN — KETOROLAC TROMETHAMINE 15 MG: 30 INJECTION, SOLUTION INTRAMUSCULAR; INTRAVENOUS at 17:57

## 2020-10-16 RX ADMIN — SODIUM CHLORIDE 1000 ML: 9 INJECTION, SOLUTION INTRAVENOUS at 17:58

## 2020-10-16 ASSESSMENT — ENCOUNTER SYMPTOMS
COLOR CHANGE: 0
VOMITING: 1
ABDOMINAL PAIN: 1
NAUSEA: 1
RHINORRHEA: 0
SHORTNESS OF BREATH: 0
SORE THROAT: 0

## 2020-10-16 ASSESSMENT — PAIN SCALES - GENERAL
PAINLEVEL_OUTOF10: 0
PAINLEVEL_OUTOF10: 3

## 2020-10-16 NOTE — ED NOTES
Pt ambulatory to restroom with a steady gate.  Pt denies needs will continue to monitor      Eduardo Garcia RN  10/16/20 3146

## 2020-10-16 NOTE — ED PROVIDER NOTES
Evaluated by [id number] [facility] Provider          [facility] ED  EMERGENCY DEPARTMENT ENCOUNTER        Pt Name: Fiorella Hernandez  MRN: [id number]  [place] 1950  Dateof evaluation: 10/16/2020  Provider: INÉS Joseph - CNP  PCP: Romi Smith MD  ED Attending: No att. providers found    [address] [facility]       Chief Complaint   Patient presents with    Flank Pain       HISTORY OF PRESENTILLNESS   (Location/Symptom, Timing/Onset, Context/Setting, Quality, Duration, Modifying Factors, Severity)  Note limiting factors. Fiorella Hernandez is a 78 y.o. male for concerns for an abnormal CT. Julianna  Onset was today. Context includes patient has a history of colon cancer and is in an experimental study at [facility]. He had a routine follow-up CT today that showed a obstructing kidney stone. Patient reports that he has had some discomfort to the right side of his abdomen but denies any flank pain. Patient reports that he has had no fever. He does report one episode of nausea and vomiting after he drank the contrast for his CT but otherwise has not had any nausea or vomiting. Patient has no urinary symptoms. Alleviating factors include nothing. Aggravating factors include nothing. Pain is 3/10. Nothing has been used for pain today. Nursing Notes were all reviewed and agreed with or any disagreements were addressed  in the HPI. REVIEW OF SYSTEMS    (2-9 systems for level 4, 10 or more for level 5)     Review of Systems   Constitutional: Negative for fever. Abnormal CT   HENT: Negative for congestion, rhinorrhea and sore throat. Respiratory: Negative for shortness of breath. Cardiovascular: Negative for chest pain. Gastrointestinal: Positive for abdominal pain, nausea and vomiting. Genitourinary: Negative for decreased urine volume, difficulty urinating, flank pain, frequency and hematuria.    Musculoskeletal: Negative for arthralgias and myalgias. Skin: Negative for color change and rash. Neurological: Negative for dizziness and light-headedness. Psychiatric/Behavioral: Negative for agitation. All other systems reviewed and are negative. Positives and Pertinent negatives as per HPI. Except as noted above in the ROS, all other systems were reviewed and negative. PAST MEDICAL HISTORY     Past Medical History:   Diagnosis Date    Cerebral artery occlusion with cerebral infarction ([facility] [address]     Depression     Hypertension          SURGICAL HISTORY       Past Surgical History:   Procedure Laterality Date    NOSE SURGERY      SALPINGO-OOPHORECTOMY      WISDOM TOOTH EXTRACTION           CURRENT MEDICATIONS       Discharge Medication List as of 10/16/2020  9:07 PM      CONTINUE these medications which have NOT CHANGED    Details   ondansetron (ZOFRAN) 4 MG tablet Take 1 tablet by mouth every 8 hours as needed for Nausea, Disp-20 tablet, R-0Print      aspirin 81 MG EC tablet Take 81 mg by mouthHistorical Med      atorvastatin (LIPITOR) 40 MG tablet TAKE 1 TABLET BY MOUTH DAILY. Historical Med      clopidogrel (PLAVIX) 75 MG tablet TAKE 1 TAB BY MOUTH DAILY. Historical Med      budesonide-formoterol (SYMBICORT) 80-4.5 MCG/ACT AERO Inhale 1 puff into the lungsHistorical Med      DULoxetine (CYMBALTA) 60 MG extended release capsule TAKE ONE CAPSULE BY MOUTH EVERY DAYHistorical Med      Multiple Vitamins-Minerals (MULTIVITAMIN ADULT EXTRA C PO) Take by mouthHistorical Med      pantoprazole (PROTONIX) 40 MG tablet TAKE 1 TAB BY MOUTH DAILY. Historical Med      naproxen (NAPROSYN) 500 MG tablet Take 1 tablet by mouth 2 times daily. , Disp-20 tablet, R-0Print               ALLERGIES     Patient has no known allergies. FAMILY HISTORY     History reviewed. No pertinent family history.        SOCIAL HISTORY       Social History     Socioeconomic History    Marital status:      Spouse name: None    Number of children: None    Years of education: None    Highest education level: None   Occupational History    None   Social Needs    Financial resource strain: None    Food insecurity     Worry: None     Inability: None    Transportation needs     Medical: None     Non-medical: None   Tobacco Use    Smoking status: Current Every Day Smoker     Packs/day: 0.50     Types: Cigarettes    Smokeless tobacco: Never Used   Substance and Sexual Activity    Alcohol use: Yes     Comment: occ     Drug use: No    Sexual activity: None   Lifestyle    Physical activity     Days per week: None     Minutes per session: None    Stress: None   Relationships    Social connections     Talks on phone: None     Gets together: None     Attends Quaker service: None     Active member of club or organization: None     Attends meetings of clubs or organizations: None     Relationship status: None    Intimate partner violence     Fear of current or ex partner: None     Emotionally abused: None     Physically abused: None     Forced sexual activity: None   Other Topics Concern    None   Social History Narrative    None       SCREENINGS    [place] Coma Scale  Eye Opening: Spontaneous  Best Verbal Response: Oriented  Best Motor Response: Obeys commands  [place] Coma Scale Score: 15        PHYSICAL EXAM  (up to 7 for level 4, 8 or more for level 5)     ED Triage Vitals [10/16/20 1654]   BP Temp Temp src Pulse Resp SpO2 Height Weight   (!) 157/81 98.3 °F (36.8 °C) -- 78 16 100 % -- --       Physical Exam  Constitutional:       Appearance: He is well-developed. He is obese. HENT:      Head: Normocephalic and atraumatic. Neck:      Musculoskeletal: Normal range of motion. Cardiovascular:      Rate and Rhythm: Normal rate. Pulmonary:      Effort: Pulmonary effort is normal. No respiratory distress. Abdominal:      General: Bowel sounds are decreased. There is no distension. Palpations: Abdomen is soft. Tenderness: There is abdominal tenderness. Musculoskeletal: Normal range of motion. Skin:     General: Skin is warm and dry. Neurological:      Mental Status: He is alert and oriented to person, place, and time.          DIAGNOSTIC RESULTS   LABS:    Labs Reviewed   CBC WITH AUTO DIFFERENTIAL - Abnormal; Notable for the following components:       Result Value    Hemoglobin 11.1 (*)     Hematocrit 33.4 (*)     MCV 78.7 (*)     RDW 19.8 (*)     Neutrophils Absolute 7.9 (*)     Microcytes Occasional (*)     Hypochromia Occasional (*)     All other components within normal limits    Narrative:     Performed at:  [facility],  [place]   Phone [phone]   COMPREHENSIVE METABOLIC PANEL W/ REFLEX TO MG FOR LOW K - Abnormal; Notable for the following components:    Potassium reflex Magnesium 3.4 (*)     Glucose 164 (*)     CREATININE 1.5 (*)     GFR Non- 46 (*)     GFR  56 (*)     All other components within normal limits    Narrative:     Performed at:  [facility] - [facility]  [place]   Phone [phone]   URINE RT REFLEX TO CULTURE - Abnormal; Notable for the following components:    Glucose, Ur 100 (*)     Blood, Urine LARGE (*)     All other components within normal limits    Narrative:     Performed at:  [facility],  [employer]   Phone [phone]   MICROSCOPIC URINALYSIS - Abnormal; Notable for the following components:    RBC, UA  (*)     Crystals, UA 1+ Uric Acid (*)     All other components within normal limits    Narrative:     Performed at:  [facility]) - [facility],  [employer]   Phone [phone]   MAGNESIUM - Abnormal; Notable for the following components:    Magnesium 1.60 (*)     All other components within normal limits    Narrative:     Performed at:  [facility]) - [facility]  [address],  [place]   Phone [phone]       All other labs werewithin normal range or not returned as of this dictation. EKG: All EKG's are interpreted by the Emergency Department Physician who either signs or Co-signs this chart in the absence of a cardiologist.  Please see their note for interpretation of EKG. RADIOLOGY:           Interpretation per the Radiologist below, if available at the time of this note:    No orders to display     No results found. PROCEDURES   Unless otherwise noted below, none     Procedures     CRITICAL CARE TIME   N/A    CONSULTS:  None      EMERGENCYDEPARTMENT COURSE and DIFFERENTIAL DIAGNOSIS/MDM:   Vitals:    Vitals:    10/16/20 1654 10/16/20 1706 10/16/20 1939   BP: (!) 157/81 (!) 140/73 (!) 142/70   Pulse: 78  78   Resp: 16     Temp: 98.3 °F (36.8 °C)     SpO2: 100% 99% 98%       Patient was given the following medications:  Medications   0.9 % sodium chloride bolus (0 mLs Intravenous Stopped 10/16/20 1934)   ketorolac (TORADOL) injection 15 mg (15 mg Intravenous Given 10/16/20 1757)   magnesium sulfate 1 g in dextrose 5% 100 mL IVPB (0 g Intravenous Stopped 10/16/20 2107)   potassium chloride (KLOR-CON M) extended release tablet 40 mEq (40 mEq Oral Given 10/16/20 2107)         Patient was seen and evaluated by myself. Patient here for complaints of abnormal CT. Patient reports that he is currently in a clinical trial at [facility]. He reports that he had a routine CAT scan done today to evaluate his colon status. Patient states that they called him telling him that he had an obstructing kidney stone and that he should be evaluated in the ED. On arrival to the ED the patient was found to be awake and alert hemodynamically stable nontoxic in appearance. Patient does complain about some discomfort to his right side but does not have any flank pain or urinary symptoms.   He reports that he did have one episode of nausea and vomiting today but relates that to drinking the contrast.  Lab values have been reviewed and interpreted. I was able to review the CAT scan done at [facility] today that did show a 2 mm stone in the distal ureter. Patient did have decrease in his kidney function and some hematuria. He was provided with some IV fluids and pain medications. He was also given potassium and magnesium replacements. At this point patient was instructed to follow-up with his primary care doctor in the next few days for reevaluation and return to the ED for any worsening symptoms. Patient was also found to have a 2 mm calculus in the right distal ureter with mild right hydronephrosis. He will be discharged with a prescription for Flomax and Zofran. Patient was also given a referral for urology and encouraged to follow-up. Patient was ultimately discharged home with all questions answered. The patient tolerated their visit well. I have evaluated this patient. My supervising physician was available for consultation. The patient and / or the family were informed of the results of any tests, a time was given to answer questions, a plan was proposed and they agreed with plan. FINAL IMPRESSION      1. Abnormal CT of the abdomen    2. Kidney stone    3. BRADLEY (acute kidney injury) ([address]    4. Hypokalemia    5. Hypomagnesemia    6.  History of colon cancer          DISPOSITION/PLAN   DISPOSITION Decision To Discharge 10/16/2020 09:18:56 PM      PATIENT REFERRED TO:  Sonia Morrow MD  [address] [address] [address] [facility]  [phone]    Schedule an appointment as soon as possible for a visit in 2 days  If symptoms worsen, for re-evaluation    [facility]  [address] [facility]    If symptoms worsen    Ramy Carias MD  [address] [facility] [place] [facility] [address] [facility] [address]  [phone]      for re-evaluation      DISCHARGE MEDICATIONS:  Discharge Medication List as of 10/16/2020  9:07 PM      START taking these medications    Details   tamsulosin (FLOMAX) 0.4 MG capsule Take 1 capsule by mouth daily for 5 doses, Disp-5 capsule,R-0Print             DISCONTINUED MEDICATIONS:  Discharge Medication List as of 10/16/2020  9:07 PM                 (Please note that portions of this note were completed with a voice recognition program.  Efforts were made to edit the dictations but occasionally words are mis-transcribed.)    INÉS Thomas CNP (electronically signed)         INÉS Thomas CNP  10/16/20 5662

## 2021-09-05 ENCOUNTER — APPOINTMENT (OUTPATIENT)
Dept: MRI IMAGING | Age: 71
DRG: 068 | End: 2021-09-05
Attending: INTERNAL MEDICINE
Payer: MEDICARE

## 2021-09-05 ENCOUNTER — APPOINTMENT (OUTPATIENT)
Dept: GENERAL RADIOLOGY | Age: 71
End: 2021-09-05
Payer: MEDICARE

## 2021-09-05 ENCOUNTER — APPOINTMENT (OUTPATIENT)
Dept: CT IMAGING | Age: 71
End: 2021-09-05
Payer: MEDICARE

## 2021-09-05 ENCOUNTER — HOSPITAL ENCOUNTER (INPATIENT)
Age: 71
LOS: 6 days | Discharge: HOME | DRG: 068 | End: 2021-09-11
Attending: INTERNAL MEDICINE | Admitting: INTERNAL MEDICINE
Payer: MEDICARE

## 2021-09-05 ENCOUNTER — HOSPITAL ENCOUNTER (EMERGENCY)
Age: 71
Discharge: ANOTHER HOSPITAL | End: 2021-09-05
Attending: EMERGENCY MEDICINE
Payer: MEDICARE

## 2021-09-05 VITALS
DIASTOLIC BLOOD PRESSURE: 94 MMHG | RESPIRATION RATE: 20 BRPM | OXYGEN SATURATION: 96 % | TEMPERATURE: 97.9 F | HEART RATE: 96 BPM | BODY MASS INDEX: 31.5 KG/M2 | HEIGHT: 70 IN | WEIGHT: 220 LBS | SYSTOLIC BLOOD PRESSURE: 114 MMHG

## 2021-09-05 DIAGNOSIS — G45.9 TIA (TRANSIENT ISCHEMIC ATTACK): ICD-10-CM

## 2021-09-05 DIAGNOSIS — N17.9 AKI (ACUTE KIDNEY INJURY) (HCC): Primary | ICD-10-CM

## 2021-09-05 DIAGNOSIS — K29.70 GASTRITIS, PRESENCE OF BLEEDING UNSPECIFIED, UNSPECIFIED CHRONICITY, UNSPECIFIED GASTRITIS TYPE: ICD-10-CM

## 2021-09-05 LAB
A/G RATIO: 1.4 (ref 1.1–2.2)
ALBUMIN SERPL-MCNC: 4.8 G/DL (ref 3.4–5)
ALP BLD-CCNC: 94 U/L (ref 40–129)
ALT SERPL-CCNC: 20 U/L (ref 10–40)
ANION GAP SERPL CALCULATED.3IONS-SCNC: 11 MMOL/L (ref 3–16)
AST SERPL-CCNC: 22 U/L (ref 15–37)
BASOPHILS ABSOLUTE: 0.1 K/UL (ref 0–0.2)
BASOPHILS RELATIVE PERCENT: 0.6 %
BILIRUB SERPL-MCNC: 1.1 MG/DL (ref 0–1)
BUN BLDV-MCNC: 13 MG/DL (ref 7–20)
C DIFF TOXIN/ANTIGEN: NORMAL
C DIFF TOXIN/ANTIGEN: NORMAL
CALCIUM SERPL-MCNC: 10.4 MG/DL (ref 8.3–10.6)
CHLORIDE BLD-SCNC: 102 MMOL/L (ref 99–110)
CO2: 26 MMOL/L (ref 21–32)
CREAT SERPL-MCNC: 1.5 MG/DL (ref 0.8–1.3)
EKG ATRIAL RATE: 90 BPM
EKG DIAGNOSIS: NORMAL
EKG P AXIS: 63 DEGREES
EKG P-R INTERVAL: 158 MS
EKG Q-T INTERVAL: 362 MS
EKG QRS DURATION: 92 MS
EKG QTC CALCULATION (BAZETT): 442 MS
EKG R AXIS: 14 DEGREES
EKG T AXIS: 32 DEGREES
EKG VENTRICULAR RATE: 90 BPM
EOSINOPHILS ABSOLUTE: 0.2 K/UL (ref 0–0.6)
EOSINOPHILS RELATIVE PERCENT: 1.1 %
GFR AFRICAN AMERICAN: 56
GFR NON-AFRICAN AMERICAN: 46
GLOBULIN: 3.5 G/DL
GLUCOSE BLD-MCNC: 127 MG/DL (ref 70–99)
GLUCOSE BLD-MCNC: 137 MG/DL (ref 70–99)
GLUCOSE BLD-MCNC: 139 MG/DL (ref 70–99)
GLUCOSE BLD-MCNC: 239 MG/DL (ref 70–99)
HCT VFR BLD CALC: 45 % (ref 40.5–52.5)
HEMOGLOBIN: 14.8 G/DL (ref 13.5–17.5)
INR BLD: 1 (ref 0.88–1.12)
LYMPHOCYTES ABSOLUTE: 1.2 K/UL (ref 1–5.1)
LYMPHOCYTES RELATIVE PERCENT: 7.9 %
MCH RBC QN AUTO: 29.6 PG (ref 26–34)
MCHC RBC AUTO-ENTMCNC: 32.9 G/DL (ref 31–36)
MCV RBC AUTO: 89.9 FL (ref 80–100)
MONOCYTES ABSOLUTE: 1 K/UL (ref 0–1.3)
MONOCYTES RELATIVE PERCENT: 6.8 %
NEUTROPHILS ABSOLUTE: 12.8 K/UL (ref 1.7–7.7)
NEUTROPHILS RELATIVE PERCENT: 83.6 %
PDW BLD-RTO: 14.1 % (ref 12.4–15.4)
PERFORMED ON: ABNORMAL
PLATELET # BLD: 187 K/UL (ref 135–450)
PMV BLD AUTO: 11.6 FL (ref 5–10.5)
POTASSIUM SERPL-SCNC: 4.1 MMOL/L (ref 3.5–5.1)
PROTHROMBIN TIME: 11.3 SEC (ref 9.9–12.7)
RBC # BLD: 5.01 M/UL (ref 4.2–5.9)
SODIUM BLD-SCNC: 139 MMOL/L (ref 136–145)
TOTAL PROTEIN: 8.3 G/DL (ref 6.4–8.2)
TROPONIN: <0.01 NG/ML
WBC # BLD: 15.3 K/UL (ref 4–11)

## 2021-09-05 PROCEDURE — 2580000003 HC RX 258: Performed by: EMERGENCY MEDICINE

## 2021-09-05 PROCEDURE — G0378 HOSPITAL OBSERVATION PER HR: HCPCS

## 2021-09-05 PROCEDURE — 80053 COMPREHEN METABOLIC PANEL: CPT

## 2021-09-05 PROCEDURE — 93005 ELECTROCARDIOGRAM TRACING: CPT | Performed by: EMERGENCY MEDICINE

## 2021-09-05 PROCEDURE — 70544 MR ANGIOGRAPHY HEAD W/O DYE: CPT

## 2021-09-05 PROCEDURE — 99284 EMERGENCY DEPT VISIT MOD MDM: CPT

## 2021-09-05 PROCEDURE — 36415 COLL VENOUS BLD VENIPUNCTURE: CPT

## 2021-09-05 PROCEDURE — 87324 CLOSTRIDIUM AG IA: CPT

## 2021-09-05 PROCEDURE — 1200000000 HC SEMI PRIVATE

## 2021-09-05 PROCEDURE — 2580000003 HC RX 258: Performed by: INTERNAL MEDICINE

## 2021-09-05 PROCEDURE — 6370000000 HC RX 637 (ALT 250 FOR IP): Performed by: INTERNAL MEDICINE

## 2021-09-05 PROCEDURE — 6360000002 HC RX W HCPCS: Performed by: INTERNAL MEDICINE

## 2021-09-05 PROCEDURE — 83036 HEMOGLOBIN GLYCOSYLATED A1C: CPT

## 2021-09-05 PROCEDURE — 70547 MR ANGIOGRAPHY NECK W/O DYE: CPT

## 2021-09-05 PROCEDURE — 87449 NOS EACH ORGANISM AG IA: CPT

## 2021-09-05 PROCEDURE — G0379 DIRECT REFER HOSPITAL OBSERV: HCPCS

## 2021-09-05 PROCEDURE — 70450 CT HEAD/BRAIN W/O DYE: CPT

## 2021-09-05 PROCEDURE — 70551 MRI BRAIN STEM W/O DYE: CPT

## 2021-09-05 PROCEDURE — 93010 ELECTROCARDIOGRAM REPORT: CPT | Performed by: INTERNAL MEDICINE

## 2021-09-05 PROCEDURE — 85025 COMPLETE CBC W/AUTO DIFF WBC: CPT

## 2021-09-05 PROCEDURE — 71045 X-RAY EXAM CHEST 1 VIEW: CPT

## 2021-09-05 PROCEDURE — 85610 PROTHROMBIN TIME: CPT

## 2021-09-05 PROCEDURE — 84484 ASSAY OF TROPONIN QUANT: CPT

## 2021-09-05 RX ORDER — ATORVASTATIN CALCIUM 40 MG/1
40 TABLET, FILM COATED ORAL DAILY
Status: DISCONTINUED | OUTPATIENT
Start: 2021-09-05 | End: 2021-09-06

## 2021-09-05 RX ORDER — LOPERAMIDE HYDROCHLORIDE 2 MG/1
2 CAPSULE ORAL 4 TIMES DAILY PRN
Status: DISCONTINUED | OUTPATIENT
Start: 2021-09-05 | End: 2021-09-11 | Stop reason: HOSPADM

## 2021-09-05 RX ORDER — CLOPIDOGREL BISULFATE 75 MG/1
75 TABLET ORAL DAILY
Status: DISCONTINUED | OUTPATIENT
Start: 2021-09-05 | End: 2021-09-11 | Stop reason: HOSPADM

## 2021-09-05 RX ORDER — INSULIN LISPRO 100 [IU]/ML
0-6 INJECTION, SOLUTION INTRAVENOUS; SUBCUTANEOUS NIGHTLY
Status: DISCONTINUED | OUTPATIENT
Start: 2021-09-05 | End: 2021-09-11 | Stop reason: HOSPADM

## 2021-09-05 RX ORDER — SODIUM CHLORIDE 9 MG/ML
25 INJECTION, SOLUTION INTRAVENOUS PRN
Status: DISCONTINUED | OUTPATIENT
Start: 2021-09-05 | End: 2021-09-11 | Stop reason: HOSPADM

## 2021-09-05 RX ORDER — BUDESONIDE AND FORMOTEROL FUMARATE DIHYDRATE 80; 4.5 UG/1; UG/1
1 AEROSOL RESPIRATORY (INHALATION) 2 TIMES DAILY
Status: DISCONTINUED | OUTPATIENT
Start: 2021-09-05 | End: 2021-09-05 | Stop reason: CLARIF

## 2021-09-05 RX ORDER — ARFORMOTEROL TARTRATE 15 UG/2ML
15 SOLUTION RESPIRATORY (INHALATION) 2 TIMES DAILY
Status: DISCONTINUED | OUTPATIENT
Start: 2021-09-05 | End: 2021-09-11 | Stop reason: HOSPADM

## 2021-09-05 RX ORDER — PANTOPRAZOLE SODIUM 40 MG/1
40 TABLET, DELAYED RELEASE ORAL
Status: DISCONTINUED | OUTPATIENT
Start: 2021-09-05 | End: 2021-09-07

## 2021-09-05 RX ORDER — BUDESONIDE 0.25 MG/2ML
0.25 INHALANT ORAL 2 TIMES DAILY
Status: DISCONTINUED | OUTPATIENT
Start: 2021-09-05 | End: 2021-09-11 | Stop reason: HOSPADM

## 2021-09-05 RX ORDER — NICOTINE POLACRILEX 4 MG
15 LOZENGE BUCCAL PRN
Status: DISCONTINUED | OUTPATIENT
Start: 2021-09-05 | End: 2021-09-11 | Stop reason: HOSPADM

## 2021-09-05 RX ORDER — DEXTROSE MONOHYDRATE 50 MG/ML
100 INJECTION, SOLUTION INTRAVENOUS PRN
Status: DISCONTINUED | OUTPATIENT
Start: 2021-09-05 | End: 2021-09-11 | Stop reason: HOSPADM

## 2021-09-05 RX ORDER — ASPIRIN 81 MG/1
81 TABLET ORAL DAILY
Status: DISCONTINUED | OUTPATIENT
Start: 2021-09-05 | End: 2021-09-11 | Stop reason: HOSPADM

## 2021-09-05 RX ORDER — ONDANSETRON 2 MG/ML
4 INJECTION INTRAMUSCULAR; INTRAVENOUS EVERY 6 HOURS PRN
Status: DISCONTINUED | OUTPATIENT
Start: 2021-09-05 | End: 2021-09-11 | Stop reason: HOSPADM

## 2021-09-05 RX ORDER — DEXTROSE MONOHYDRATE 25 G/50ML
12.5 INJECTION, SOLUTION INTRAVENOUS PRN
Status: DISCONTINUED | OUTPATIENT
Start: 2021-09-05 | End: 2021-09-11 | Stop reason: HOSPADM

## 2021-09-05 RX ORDER — ASPIRIN 81 MG/1
243 TABLET, CHEWABLE ORAL ONCE
Status: DISCONTINUED | OUTPATIENT
Start: 2021-09-05 | End: 2021-09-05 | Stop reason: HOSPADM

## 2021-09-05 RX ORDER — 0.9 % SODIUM CHLORIDE 0.9 %
1000 INTRAVENOUS SOLUTION INTRAVENOUS ONCE
Status: COMPLETED | OUTPATIENT
Start: 2021-09-05 | End: 2021-09-05

## 2021-09-05 RX ORDER — CHOLESTYRAMINE LIGHT 4 G/5.7G
4 POWDER, FOR SUSPENSION ORAL 2 TIMES DAILY
Status: DISCONTINUED | OUTPATIENT
Start: 2021-09-05 | End: 2021-09-11 | Stop reason: HOSPADM

## 2021-09-05 RX ORDER — INSULIN LISPRO 100 [IU]/ML
0-12 INJECTION, SOLUTION INTRAVENOUS; SUBCUTANEOUS
Status: DISCONTINUED | OUTPATIENT
Start: 2021-09-05 | End: 2021-09-11 | Stop reason: HOSPADM

## 2021-09-05 RX ORDER — POLYETHYLENE GLYCOL 3350 17 G/17G
17 POWDER, FOR SOLUTION ORAL DAILY PRN
Status: DISCONTINUED | OUTPATIENT
Start: 2021-09-05 | End: 2021-09-11 | Stop reason: HOSPADM

## 2021-09-05 RX ORDER — DULOXETIN HYDROCHLORIDE 60 MG/1
60 CAPSULE, DELAYED RELEASE ORAL DAILY
Status: DISCONTINUED | OUTPATIENT
Start: 2021-09-05 | End: 2021-09-11 | Stop reason: HOSPADM

## 2021-09-05 RX ORDER — SODIUM CHLORIDE 0.9 % (FLUSH) 0.9 %
5-40 SYRINGE (ML) INJECTION PRN
Status: DISCONTINUED | OUTPATIENT
Start: 2021-09-05 | End: 2021-09-11 | Stop reason: HOSPADM

## 2021-09-05 RX ORDER — SODIUM CHLORIDE 0.9 % (FLUSH) 0.9 %
5-40 SYRINGE (ML) INJECTION EVERY 12 HOURS SCHEDULED
Status: DISCONTINUED | OUTPATIENT
Start: 2021-09-05 | End: 2021-09-11 | Stop reason: HOSPADM

## 2021-09-05 RX ORDER — ONDANSETRON 4 MG/1
4 TABLET, ORALLY DISINTEGRATING ORAL EVERY 8 HOURS PRN
Status: DISCONTINUED | OUTPATIENT
Start: 2021-09-05 | End: 2021-09-11 | Stop reason: HOSPADM

## 2021-09-05 RX ADMIN — ATORVASTATIN CALCIUM 40 MG: 40 TABLET, FILM COATED ORAL at 12:48

## 2021-09-05 RX ADMIN — Medication 10 ML: at 20:51

## 2021-09-05 RX ADMIN — SODIUM CHLORIDE 1000 ML: 9 INJECTION, SOLUTION INTRAVENOUS at 04:29

## 2021-09-05 RX ADMIN — ENOXAPARIN SODIUM 40 MG: 40 INJECTION SUBCUTANEOUS at 12:48

## 2021-09-05 RX ADMIN — ONDANSETRON 4 MG: 2 INJECTION INTRAMUSCULAR; INTRAVENOUS at 15:56

## 2021-09-05 RX ADMIN — Medication 10 ML: at 12:48

## 2021-09-05 RX ADMIN — LOPERAMIDE HYDROCHLORIDE 2 MG: 2 CAPSULE ORAL at 20:51

## 2021-09-05 RX ADMIN — CHOLESTYRAMINE 4 G: 4 POWDER, FOR SUSPENSION ORAL at 13:48

## 2021-09-05 RX ADMIN — ASPIRIN 81 MG: 81 TABLET, COATED ORAL at 12:48

## 2021-09-05 RX ADMIN — DULOXETINE HYDROCHLORIDE 60 MG: 60 CAPSULE, DELAYED RELEASE ORAL at 12:48

## 2021-09-05 RX ADMIN — PANTOPRAZOLE SODIUM 40 MG: 40 TABLET, DELAYED RELEASE ORAL at 12:48

## 2021-09-05 RX ADMIN — CLOPIDOGREL BISULFATE 75 MG: 75 TABLET ORAL at 12:48

## 2021-09-05 ASSESSMENT — PAIN SCALES - GENERAL
PAINLEVEL_OUTOF10: 0

## 2021-09-05 NOTE — CONSULTS
Neurology Consult Note  Reason for Consult: possible stroke  Referring MD Edelmira Vance MD asked me to see Duane Islam in consultation. History of Present Illness:  Duane Islam is a 73 y.o. male who presents with acute onset of nausea, vomiting and diarrhea that started abruptly 1 day prior to admission. It was also associated with generalized weakness and problems walking an confusion. Confusion he believes lasted about 45 minutes. Nausea, diarrhea and vomiting was intermittent for several hours. He is now still having dizziness. He has had intermittent belching and GERD and that continues--diagnosed with colon cancer about 2yrs ago and said similar presentation with heartburn, diarrhea and nausea and \"sour stomach\"  No need for gait assist normally. Per patient carotid occlusion left noted in 2012 and he had CEA in 2012 right        Medical History:  Past Medical History:   Diagnosis Date    Cerebral artery occlusion with cerebral infarction ([facility] [address]     Depression     Hypertension    Colon cancer  Diabetes  Stroke  LEFT carotid occlusion  Right carotid stenosis    Past Surgical History:   Procedure Laterality Date    NOSE SURGERY      SALPINGO-OOPHORECTOMY      WISDOM TOOTH EXTRACTION     RIGHT CEA  2012    Medications Prior to Admission: tamsulosin (FLOMAX) 0.4 MG capsule, Take 1 capsule by mouth daily for 5 doses  ondansetron (ZOFRAN) 4 MG tablet, Take 1 tablet by mouth every 8 hours as needed for Nausea  aspirin 81 MG EC tablet, Take 81 mg by mouth  atorvastatin (LIPITOR) 40 MG tablet, TAKE 1 TABLET BY MOUTH DAILY. clopidogrel (PLAVIX) 75 MG tablet, TAKE 1 TAB BY MOUTH DAILY.   budesonide-formoterol (SYMBICORT) 80-4.5 MCG/ACT AERO, Inhale 1 puff into the lungs  DULoxetine (CYMBALTA) 60 MG extended release capsule, TAKE ONE CAPSULE BY MOUTH EVERY DAY  Multiple Vitamins-Minerals (MULTIVITAMIN ADULT EXTRA C PO), Take by mouth  pantoprazole (PROTONIX) 40 MG tablet, TAKE 1 TAB BY MOUTH DAILY. naproxen (NAPROSYN) 500 MG tablet, Take 1 tablet by mouth 2 times daily. No Known Allergies  No family history on file. Social History     Tobacco Use   Smoking Status Current Every Day Smoker    Packs/day: 0.50    Types: Cigarettes   Smokeless Tobacco Never Used     Social History     Substance and Sexual Activity   Drug Use No     Social History     Substance and Sexual Activity   Alcohol Use Yes    Comment: occ        ROS:  Constitutional- No weight loss or fevers  Eyes- No diplopia. No photophobia. No loss of vision. No blurry vision  Ears/nose/throat- No dysphagia. No Dysarthria. No ringing in ears. No hearing loss  Cardiovascular- No palpitations. No chest pain  Respiratory- No dyspnea. No Cough  Gastrointestinal- No Abdominal pain. +NAUSEA AND Vomiting. Genitourinary- No incontinence. No urinary retention  Musculoskeletal- No myalgia. No arthralgia. No neck pain. No back pain. Skin- No rash. No easy bruising. Psychiatric- No depression. No anxiety  Hematologic- No bleeding difficulty. No bruising  Neurologic- + weakness. No Headache. No memory loss. No numbness +DIFFICULTY WALKING + DIZZINESS      Exam:  Blood pressure 122/81, pulse 72, temperature 98.1 °F (36.7 °C), temperature source Oral, resp. rate 18, SpO2 96 %. Constitutional   Vital signs: BP, HR, and RR reviewed   General Alert, no distress, well-nourished  Cardiovascular: pulses symmetric in all 4 extremities. No peripheral edema. No carotid bruit. RRR  Psychiatric: cooperative with examination, no  psychotic behavior noted.   Neurologic  Mental status:   orientation to person and place   General fund of knowledge grossly intact   Memory grossly intact   Attention intact as able to attend well to the exam     Language fluent in conversation   Comprehension intact; follows simple commands  Cranial nerves:   CN2: Visual Fields full ,   CN 3,4,6: extraocular muscles intact,  CN5: facial sensation symmetric CN7:face symmetric without dysarthria,   CN8: hearing DECREASE  CN9: palate elevated symmetrically  CN11: trap full strength on shoulder shrug  CN12: tongue midline with protrusion  Strength: No prontator drift. Good strength in all 4 extremities   Deep tendon reflexes: ABSENT in all 4 extremities. Negative Babinski  Sensory: light touch intact in all 4 extremities. Intact pinprick. Intact Vibratory sense. Cerebellar/coordination: finger nose finger normal without ataxia  Tone: normal in all 4 extremities  Gait: mild ataxia    Labs:   CBC with Differential:    Lab Results   Component Value Date    WBC 15.3 09/05/2021    RBC 5.01 09/05/2021    HGB 14.8 09/05/2021    HCT 45.0 09/05/2021     09/05/2021    MCV 89.9 09/05/2021    MCH 29.6 09/05/2021    MCHC 32.9 09/05/2021    RDW 14.1 09/05/2021    SEGSPCT 69.7 02/22/2012    LYMPHOPCT 7.9 09/05/2021    MONOPCT 6.8 09/05/2021    EOSPCT 1.5 02/22/2012    BASOPCT 0.6 09/05/2021    MONOSABS 1.0 09/05/2021    LYMPHSABS 1.2 09/05/2021    EOSABS 0.2 09/05/2021    BASOSABS 0.1 09/05/2021    DIFFTYPE Auto 02/22/2012     CMP:    Lab Results   Component Value Date     09/05/2021    K 4.1 09/05/2021    K 3.4 10/16/2020     09/05/2021    CO2 26 09/05/2021    BUN 13 09/05/2021    CREATININE 1.5 09/05/2021    GFRAA 56 09/05/2021    GFRAA >60 03/06/2012    AGRATIO 1.4 09/05/2021    LABGLOM 46 09/05/2021    GLUCOSE 239 09/05/2021    PROT 8.3 09/05/2021    PROT 7.3 02/22/2012    LABALBU 4.8 09/05/2021    CALCIUM 10.4 09/05/2021    BILITOT 1.1 09/05/2021    ALKPHOS 94 09/05/2021    AST 22 09/05/2021    ALT 20 09/05/2021         Studies----CT HEAD WO CONTRAST--  9/5/2021 4:41 am  BRAIN/VENTRICLES: There is no acute intracranial hemorrhage, mass effect or  midline shift. No abnormal extra-axial fluid collection. The gray-white  differentiation is maintained without evidence of an acute infarct.   Redemonstration of a area of low attenuation in the left frontal white matter  associated with some ex vacuole dilatation of the left lateral ventricle. Features are stable and suggest area of old infarct. There is no evidence of  hydrocephalus. Impression---No acute intracranial abnormality. Stable area of old infarct in the left frontal white matter. Impression:  Nemo Perry is a 73 y.o. male who presented with acute transient confusion, weakness, nausea and vomiting. Mild dizziness and ataxia still present with minimal right nasolabial fold weakness. Several stroke risk factors and so need to rule out structural etiology. He takes aspirin and Plavix at home   Recommendations: 1. Continue Aspirin and Plavix  2. MRI brain AND MRA brain and neck  3. Echo  4. Lipid panel goal LDL < 70  5. Hemoglobin E9Q--yoes <6        A copy of this note was provided for Lee Ann Law MD.     Electronically signed by Gianni Cross MD on 9/5/2021 at 9:22 AM     [phone]  Evenings, weekends, and off weeks please discuss neurologist on-call.

## 2021-09-05 NOTE — PROGRESS NOTES
No acute events this shift. Pt awaiting MRI. Telemetry removed for scan. Transport on way to room and pt to transport via wheelchair.

## 2021-09-05 NOTE — ED NOTES
Dr. Jewel Villarreal returned page at [facility] [address] and spoke to Dr. Olivia Rodriguez about the patient     Soila Medina  89/43/18 [phone]

## 2021-09-05 NOTE — ED NOTES
Called the access center at [phone] to have the patient transferred to [facility] for neurology.  Waiting on [facility]     [facility]  [phone]

## 2021-09-05 NOTE — ED NOTES
Report called to [facility] at [facility], denies any questions.       True PETERSON Mccarthy  09/05/21 1471

## 2021-09-05 NOTE — RT PROTOCOL NOTE
RT Nebulizer Bronchodilator Protocol Note    There is a bronchodilator order in the chart from a provider indicating to follow the RT Bronchodilator Protocol and there is an Initiate RT Bronchodilator Protocol order as well (see protocol at bottom of note). The findings from the last RT Protocol Assessment were as follows:  Smoking: >15 Pack years  Surgical Status: No surgery  Xray: Clear  Respiratory Pattern: RR 12-20  Mental Status: Alert and Oriented  Breath Sounds: Clear  Cough: Strong, spontaneous, non-productive  Activity Level: Walking unassisted  Oxygen Requirement: Room Air - 2LNC/28% or home setting  Indication for Bronchodilator Therapy: On home bronchodilators  Bronchodilator Assessment Score: 2    Patient states that they rarely take their home inhalers/bronchodilators. Refusing currently. Aerosolized bronchodilator medication orders have been revised according to the RT Bronchodilator Protocol. RT Bronchodilator Protocol:    Respiratory Therapist to perform RT Therapy Protocol Assessment then follow the protocol. No Indications - adjust the frequency to every 6 hours PRN wheezing or bronchospasm, if no treatments needed after 48 hours then discontinue using Per Protocol order mode. If indication present, adjust the RT bronchodilator orders based on the Bronchodilator Assessment Score as follows:    0-6 - enter or revise RT Bronchodilator order to Albuterol Nebulizer order with frequency of every 2 hours PRN for wheezing or increased work of breathing using Per Protocol order mode. Repeat RT Therapy Protocol Assessment as needed. 7-10 - discontinue any other Inpatient aerosolized bronchodilator medication orders and enter or revise two Albuterol Nebulizer orders, one with BID frequency and one with frequency of every 2 hours PRN wheezing or increased work of breathing using Per Protocol order mode. Repeat RT Therapy Protocol Assessment with second treatment then BID and as needed. 11-13 - discontinue any other Inpatient aerosolized bronchodilator medication orders and enter DuoNeb Nebulizer order with QID frequency and an Albuterol Nebulizer order with frequency of every 2 hours PRN wheezing or increased work of breathing using Per Protocol order mode. Repeat RT Therapy Protocol Assessment with second treatment then QID and as needed. Greater than 13 - discontinue any other Inpatient aerosolized bronchodilator medication orders and enter a DuoNeb Nebulizer order with every 4 hours frequency and an Albuterol Nebulizer order with frequency of every 2 hours PRN wheezing or increased work of breathing using Per Protocol order mode. Repeat RT Therapy Protocol Assessment with second treatment then every 4 hours and as needed. RT to enter RT Home Evaluation for COPD & MDI Assessment order using Per Protocol order mode.     Electronically signed by Nata Skinner RCP on 9/5/2021 at 12:57 PM

## 2021-09-05 NOTE — ED NOTES
Report given to [facility]  [facility] reviewed with patient and transport.      Florencia Betancourt RN  09/05/21 0098

## 2021-09-05 NOTE — H&P
Hospital Medicine History & Physical      PCP: Quentin Harrell MD    Date of Admission: 9/5/2021    Date of Service: Pt seen/examined on 9/5/2021 and  Placed in Observation. Chief Complaint:  Confusion, right arm weakness      History Of Present Illness:  Patient is 29-year-old gentleman with history of colon cancer, multiple TIAs in the past, diabetes mellitus type 2 presented to [facility] ED with sudden onset of nausea, vomiting and diarrhea last night initially felt like he was blotching at 2 AM his family noticed that he was having right-sided weakness with some confusion EMS was called symptom lasted for 45 minutes. Denies any chest pain, shortness of breath  On arrival hemodynamically stable. Blood work significant for WBC 15.3. Transfer to [facility] for neurology evaluation. Past Medical History:          Diagnosis Date    Cerebral artery occlusion with cerebral infarction ([facility] [address]     Depression     Hypertension        Past Surgical History:          Procedure Laterality Date    NOSE SURGERY      SALPINGO-OOPHORECTOMY      WISDOM TOOTH EXTRACTION         Medications Prior to Admission:      Prior to Admission medications    Medication Sig Start Date End Date Taking? Authorizing Provider   metFORMIN (GLUCOPHAGE) 500 MG tablet Take 500 mg by mouth 2 times daily (with meals)   Yes Historical Provider, MD   ondansetron (ZOFRAN) 4 MG tablet Take 1 tablet by mouth every 8 hours as needed for Nausea 8/22/19  Yes Yani Pereira PA-C   aspirin 81 MG EC tablet Take 81 mg by mouth   Yes Historical Provider, MD   atorvastatin (LIPITOR) 40 MG tablet TAKE 1 TABLET BY MOUTH DAILY. 7/26/18  Yes Historical Provider, MD   clopidogrel (PLAVIX) 75 MG tablet TAKE 1 TAB BY MOUTH DAILY.  8/20/18  Yes Historical Provider, MD   DULoxetine (CYMBALTA) 60 MG extended release capsule TAKE ONE CAPSULE BY MOUTH EVERY DAY 6/18/18  Yes Historical Provider, MD   Multiple Vitamins-Minerals (MULTIVITAMIN ADULT EXTRA C PO) Take by mouth   Yes Historical Provider, MD   pantoprazole (PROTONIX) 40 MG tablet TAKE 1 TAB BY MOUTH DAILY. 5/14/18  Yes Historical Provider, MD   naproxen (NAPROSYN) 500 MG tablet Take 1 tablet by mouth 2 times daily. 3/30/14  Yes Ray Barrientos DO   tamsulosin (FLOMAX) 0.4 MG capsule Take 1 capsule by mouth daily for 5 doses 10/16/20 10/21/20  INÉS Guadalupe - CNP   budesonide-formoterol (SYMBICORT) 80-4.5 MCG/ACT AERO Inhale 1 puff into the lungs 4/5/18   Historical Provider, MD       Allergies:  Patient has no known allergies. Social History:      The patient currently lives home, independent     TOBACCO:   reports that he has been smoking cigarettes. He has been smoking about 0.50 packs per day. He has never used smokeless tobacco.  ETOH:   reports current alcohol use. Family History:       Reviewed in detail and negative for DM, CAD, Cancer, CVA. No family history on file. REVIEW OF SYSTEMS:   Pertinent positives as noted in the HPI. All other systems reviewed and negative. PHYSICAL EXAM PERFORMED:    /81   Pulse 81   Temp 97.8 °F (36.6 °C) (Oral)   Resp 18   SpO2 94%     General appearance:  No apparent distress, appears stated age and cooperative. HEENT:  Normal cephalic, atraumatic without obvious deformity. Pupils equal, round, and reactive to light. Extra ocular muscles intact. Conjunctivae/corneas clear. Neck: Supple, with full range of motion. No jugular venous distention. Trachea midline. Respiratory:  Normal respiratory effort. Clear to auscultation, bilaterally without Rales/Wheezes/Rhonchi. Cardiovascular:  Regular rate and rhythm with normal S1/S2 without murmurs, rubs or gallops. Abdomen: Soft, non-tender, non-distended with normal bowel sounds. Musculoskeletal:  No clubbing, cyanosis or edema bilaterally. Full range of motion without deformity. Skin: Skin color, texture, turgor normal.  No rashes or lesions.   Neurologic: Alert oriented x3, bilateral PERRLA, EOMI intact, mild right facial droop, normal speech, no dysmetria on finger-to-nose test, bilateral upper and lower extremity motor 5, sensation: Intact. Psychiatric:  Alert and oriented, thought content appropriate, normal insight  Capillary Refill: Brisk,< 3 seconds   Peripheral Pulses: +2 palpable, equal bilaterally       Labs:     Recent Labs     09/05/21  0234   WBC 15.3*   HGB 14.8   HCT 45.0        Recent Labs     09/05/21 0234      K 4.1      CO2 26   BUN 13   CREATININE 1.5*   CALCIUM 10.4     Recent Labs     09/05/21 0234   AST 22   ALT 20   BILITOT 1.1*   ALKPHOS 94     Recent Labs     09/05/21 0234   INR 1.00     Recent Labs     09/05/21 0234   TROPONINI <0.01       Urinalysis:      Lab Results   Component Value Date    NITRU Negative 10/16/2020    WBCUA None seen 10/16/2020    BACTERIA 1+ 02/22/2012    RBCUA  10/16/2020    BLOODU LARGE 10/16/2020    SPECGRAV 1.010 10/16/2020    GLUCOSEU 100 10/16/2020    GLUCOSEU NEGATIVE 02/22/2012       Radiology:     CXR: I have reviewed the CXR with the following interpretation: N/A  EKG:  I have reviewed the EKG with the following interpretation: NSR, non specific ST changes    MRI brain without contrast    (Results Pending)   MRA HEAD WO CONTRAST    (Results Pending)   MRA NECK WO CONTRAST    (Results Pending)       ASSESSMENT:    Active Hospital Problems    Diagnosis Date Noted    TIA (transient ischemic attack) [G45.9] 09/05/2021     Assessment and plan   #Transient Right-sided weakness with history of multiple TIAs  Continue aspirin, Plavix neurology consulted recommended MRI, MRA head and neck  Continue to monitor on telemetry  Continue statin. lipid  Panel, hemoglobin A1c  Echocardiogram  Neurochecks as per stroke power plan    #Diarrhea C. difficile negative  Hx of c diff diarrhea treated recently  We will start on Questran and as needed Imodium.     #Leukocytosis likely reactive  Chest x-ray without any consolidation. UA negative. We will continue to monitor    #Diabetes mellitus type 2  HbA1c in July 6 0.7%  On Metformin at home. Will start on insulin sliding scale for now. #CKD stage III  Creatinine 1.5 appears to be baseline. #Colon cancer status post colectomy currently on clinical trial at [facility]. #Active tobacco abuse  Still smokes half pack per day. Counseled guarding smoking cessation. Patient reported that he had tried nicotine patch and Chantix did not help. DVT Prophylaxis: lovenox  Diet: ADULT DIET; Regular; 4 carb choices (60 gm/meal)  Code Status: Full Code    PT/OT Eval Status: consulted    Dispo - obs TIA work up    This chart was likely completed using voice recognition technology and may contain unintended grammatical , phraseology,and/or punctuation errors         Magi Strickland MD    Thank you Bhupinder Sanchez MD for the opportunity to be involved in this patient's care. If you have any questions or concerns please feel free to contact me at [phone].

## 2021-09-05 NOTE — ED PROVIDER NOTES
[address] ED    CHIEF COMPLAINT  Altered Mental Status (Wife called EMS dues to patient confusion an hour ago. Pt was vomiting when EMS arrived. 4 Zofran and 500 ml NS given en route. Pt has hx of TIA. Just at [facility] for C Diff)       HISTORY OF PRESENT ILLNESS  Jorge A Mcbride is a 73 y.o. male with past medical history including CVA, DM, colon cancer, and as below who presents to the ED with concern for transient weakness of the RUE, confusion, and diarrhea. The patient's wife arrives later in the emergency department course and contributes to the history. Wife reports that sometime around 2 AM the patient had an episode in which he became confused, had word finding difficulty, and had weakness of the right upper extremity. She is unsure exactly how long this lasted but it was somewhere in the order of 5 to 10 minutes. The patient reports nausea and diarrhea. Reportedly, the patient recently completed a course of oral vancomycin for history of C. difficile. Patient reports stool is nonbloody. He denies fever, chest pain, shortness of breath, abdominal pain, dysuria, hematuria. He states he feels as if he is dehydrated. No other complaints, modifying factors or associated symptoms. I have reviewed the following from the nursing documentation:    Past Medical History:   Diagnosis Date    Cerebral artery occlusion with cerebral infarction ([facility] [address]     Depression     Hypertension      Past Surgical History:   Procedure Laterality Date    NOSE SURGERY      SALPINGO-OOPHORECTOMY      WISDOM TOOTH EXTRACTION       No family history on file.   Social History     Socioeconomic History    Marital status:      Spouse name: Not on file    Number of children: Not on file    Years of education: Not on file    Highest education level: Not on file   Occupational History    Not on file   Tobacco Use    Smoking status: Current Every Day Smoker     Packs/day: 0.50     Types: Cigarettes    Smokeless tobacco: Never Used   Vaping Use    Vaping Use: Never used   Substance and Sexual Activity    Alcohol use: Yes     Comment: occ     Drug use: No    Sexual activity: Not on file   Other Topics Concern    Not on file   Social History Narrative    Not on file     Social Determinants of Health     Financial Resource Strain:     Difficulty of Paying Living Expenses:    Food Insecurity:     Worried About Running Out of Food in the Last Year:     [address] in the Last Year:    Transportation Needs:     Lack of Transportation (Medical):  Lack of Transportation (Non-Medical):    Physical Activity:     Days of Exercise per Week:     Minutes of Exercise per Session:    Stress:     Feeling of Stress :    Social Connections:     Frequency of Communication with Friends and Family:     Frequency of Social Gatherings with Friends and Family:     Attends [employer] Services:     Active Member of Clubs or Organizations:     Attends Club or Organization Meetings:     Marital Status:    Intimate Partner Violence:     Fear of Current or Ex-Partner:     Emotionally Abused:     Physically Abused:     Sexually Abused:      Current Facility-Administered Medications   Medication Dose Route Frequency Provider Last Rate Last Admin    0.9 % sodium chloride bolus  1,000 mL IntraVENous Once Judith Brambila MD 1,000 mL/hr at 09/05/21 0429 1,000 mL at 09/05/21 0429    aspirin chewable tablet 243 mg  243 mg Oral Once Judith Brambila MD         Current Outpatient Medications   Medication Sig Dispense Refill    tamsulosin (FLOMAX) 0.4 MG capsule Take 1 capsule by mouth daily for 5 doses 5 capsule 0    ondansetron (ZOFRAN) 4 MG tablet Take 1 tablet by mouth every 8 hours as needed for Nausea 20 tablet 0    aspirin 81 MG EC tablet Take 81 mg by mouth      atorvastatin (LIPITOR) 40 MG tablet TAKE 1 TABLET BY MOUTH DAILY.  clopidogrel (PLAVIX) 75 MG tablet TAKE 1 TAB BY MOUTH DAILY.       budesonide-formoterol (SYMBICORT) 80-4.5 MCG/ACT AERO Inhale 1 puff into the lungs      DULoxetine (CYMBALTA) 60 MG extended release capsule TAKE ONE CAPSULE BY MOUTH EVERY DAY      Multiple Vitamins-Minerals (MULTIVITAMIN ADULT EXTRA C PO) Take by mouth      pantoprazole (PROTONIX) 40 MG tablet TAKE 1 TAB BY MOUTH DAILY.  naproxen (NAPROSYN) 500 MG tablet Take 1 tablet by mouth 2 times daily. 20 tablet 0     No Known Allergies    REVIEW OF SYSTEMS  10 systems reviewed, pertinent positives and negatives per HPI, otherwise noted to be negative. PHYSICAL EXAM  ED Triage Vitals   BP Temp Temp src Pulse Resp SpO2 Height Weight   21 0240 -- -- 21 0241 21 02421 024 -- --   106/83   87 15 100 %       General appearance: Awake and alert. Cooperative. No acute distress. HENT: Normocephalic. Atraumatic. Mucous membranes are tacky. Neck: Supple. Eyes: PERRL. EOMI. Heart/Chest: RRR. No murmurs. Lungs: Respirations unlabored. CTAB. Good air exchange. Speaking comfortably in full sentences. Abdomen: Soft. Non-tender. Non-distended. No rebound or guarding. Musculoskeletal: No extremity edema. No deformity. No tenderness in the extremities. All extremities neurovascularly intact. Skin: Warm and dry. No acute rashes. Neurological:   NIH Stroke Scale       1a  Level of consciousness: 0=alert; keenly responsive   1b. LOC questions:  0=Performs both tasks correctly   1c. LOC commands: 0=Performs both tasks correctly   2. Best Gaze: 0=normal   3. Visual: 0=No visual loss   4. Facial Palsy: 0=Normal symmetric movement   5a. Motor left arm: 0=No drift, limb holds 90 (or 45) degrees for full 10 seconds   5b. Motor right arm: 0=No drift, limb holds 90 (or 45) degrees for full 10 seconds   6a. motor left le=No drift, limb holds 90 (or 45) degrees for full 10 seconds   6b  Motor right le=No drift, limb holds 90 (or 45) degrees for full 10 seconds   7. Limb Ataxia: 0=Absent   8. Sensory: 0=Normal; no sensory loss   9. Best Language:  0=No aphasia, normal   10. Dysarthria: 0=Normal   11. Extinction and Inattention: 0=No abnormality         Total:  0       Psychiatric: Mood/affect: normal      LABS  I have reviewed all labs for this visit. Results for orders placed or performed during the hospital encounter of 09/05/21   CBC auto differential   Result Value Ref Range    WBC 15.3 (H) 4.0 - 11.0 K/uL    RBC 5.01 4.20 - 5.90 M/uL    Hemoglobin 14.8 13.5 - 17.5 g/dL    Hematocrit 45.0 40.5 - 52.5 %    MCV 89.9 80.0 - 100.0 fL    MCH 29.6 26.0 - 34.0 pg    MCHC 32.9 31.0 - 36.0 g/dL    RDW 14.1 12.4 - 15.4 %    Platelets 042 170 - 082 K/uL    MPV 11.6 (H) 5.0 - 10.5 fL    Neutrophils % 83.6 %    Lymphocytes % 7.9 %    Monocytes % 6.8 %    Eosinophils % 1.1 %    Basophils % 0.6 %    Neutrophils Absolute 12.8 (H) 1.7 - 7.7 K/uL    Lymphocytes Absolute 1.2 1.0 - 5.1 K/uL    Monocytes Absolute 1.0 0.0 - 1.3 K/uL    Eosinophils Absolute 0.2 0.0 - 0.6 K/uL    Basophils Absolute 0.1 0.0 - 0.2 K/uL   Comprehensive metabolic panel   Result Value Ref Range    Sodium 139 136 - 145 mmol/L    Potassium 4.1 3.5 - 5.1 mmol/L    Chloride 102 99 - 110 mmol/L    CO2 26 21 - 32 mmol/L    Anion Gap 11 3 - 16    Glucose 239 (H) 70 - 99 mg/dL    BUN 13 7 - 20 mg/dL    CREATININE 1.5 (H) 0.8 - 1.3 mg/dL    GFR Non- 46 (A) >60    GFR  56 (A) >60    Calcium 10.4 8.3 - 10.6 mg/dL    Total Protein 8.3 (H) 6.4 - 8.2 g/dL    Albumin 4.8 3.4 - 5.0 g/dL    Albumin/Globulin Ratio 1.4 1.1 - 2.2    Total Bilirubin 1.1 (H) 0.0 - 1.0 mg/dL    Alkaline Phosphatase 94 40 - 129 U/L    ALT 20 10 - 40 U/L    AST 22 15 - 37 U/L    Globulin 3.5 g/dL   Protime-INR   Result Value Ref Range    Protime 11.3 9.9 - 12.7 sec    INR 1.00 0.88 - 1.12   Troponin   Result Value Ref Range    Troponin <0.01 <0.01 ng/mL       RADIOLOGY  I have reviewed all radiographic studies for this visit.    CT HEAD WO CONTRAST questions were answered and the patient/family expressed understanding and agreement with the plan. PROCEDURES  None    CRITICAL CARE  N/A    CLINICAL IMPRESSION  1. BRADLEY (acute kidney injury) ([facility] [address]    2. TIA (transient ischemic attack)        DISPOSITION   transfer    Chucho Malik MD    Note: This chart was created using voice recognition dictation software. Efforts were made by me to ensure accuracy, however some errors may be present due to limitations of this technology and occasionally words are not transcribed correctly.         Chucho Malik MD  09/05/21 1227

## 2021-09-05 NOTE — PLAN OF CARE
Problem: Falls - Risk of:  Goal: Will remain free from falls  Description: Will remain free from falls  Outcome: Ongoing  Pt is a high fall risk. High fall precautions are in place: nonskid socks, yellow blanket, fall wristband, call light in reach, bed is locked and in lowest position, bed alarm engaged.

## 2021-09-06 LAB
ANION GAP SERPL CALCULATED.3IONS-SCNC: 12 MMOL/L (ref 3–16)
BUN BLDV-MCNC: 23 MG/DL (ref 7–20)
CALCIUM SERPL-MCNC: 8.8 MG/DL (ref 8.3–10.6)
CHLORIDE BLD-SCNC: 101 MMOL/L (ref 99–110)
CHOLESTEROL, TOTAL: 95 MG/DL (ref 0–199)
CO2: 23 MMOL/L (ref 21–32)
CREAT SERPL-MCNC: 1.4 MG/DL (ref 0.8–1.3)
ESTIMATED AVERAGE GLUCOSE: 145.6 MG/DL
GFR AFRICAN AMERICAN: >60
GFR NON-AFRICAN AMERICAN: 50
GLUCOSE BLD-MCNC: 126 MG/DL (ref 70–99)
GLUCOSE BLD-MCNC: 133 MG/DL (ref 70–99)
GLUCOSE BLD-MCNC: 141 MG/DL (ref 70–99)
GLUCOSE BLD-MCNC: 146 MG/DL (ref 70–99)
GLUCOSE BLD-MCNC: 151 MG/DL (ref 70–99)
HBA1C MFR BLD: 6.7 %
HCT VFR BLD CALC: 40.4 % (ref 40.5–52.5)
HDLC SERPL-MCNC: 26 MG/DL (ref 40–60)
HEMOGLOBIN: 13.9 G/DL (ref 13.5–17.5)
LDL CHOLESTEROL CALCULATED: 45 MG/DL
MCH RBC QN AUTO: 31 PG (ref 26–34)
MCHC RBC AUTO-ENTMCNC: 34.5 G/DL (ref 31–36)
MCV RBC AUTO: 89.6 FL (ref 80–100)
PDW BLD-RTO: 14 % (ref 12.4–15.4)
PERFORMED ON: ABNORMAL
PLATELET # BLD: 167 K/UL (ref 135–450)
PMV BLD AUTO: 11.6 FL (ref 5–10.5)
POTASSIUM REFLEX MAGNESIUM: 3.6 MMOL/L (ref 3.5–5.1)
RBC # BLD: 4.51 M/UL (ref 4.2–5.9)
SODIUM BLD-SCNC: 136 MMOL/L (ref 136–145)
TRIGL SERPL-MCNC: 120 MG/DL (ref 0–150)
VLDLC SERPL CALC-MCNC: 24 MG/DL
WBC # BLD: 10.9 K/UL (ref 4–11)

## 2021-09-06 PROCEDURE — 36415 COLL VENOUS BLD VENIPUNCTURE: CPT

## 2021-09-06 PROCEDURE — 97161 PT EVAL LOW COMPLEX 20 MIN: CPT

## 2021-09-06 PROCEDURE — 6360000002 HC RX W HCPCS: Performed by: INTERNAL MEDICINE

## 2021-09-06 PROCEDURE — 80048 BASIC METABOLIC PNL TOTAL CA: CPT

## 2021-09-06 PROCEDURE — G0378 HOSPITAL OBSERVATION PER HR: HCPCS

## 2021-09-06 PROCEDURE — 87505 NFCT AGENT DETECTION GI: CPT

## 2021-09-06 PROCEDURE — 2580000003 HC RX 258: Performed by: INTERNAL MEDICINE

## 2021-09-06 PROCEDURE — 83036 HEMOGLOBIN GLYCOSYLATED A1C: CPT

## 2021-09-06 PROCEDURE — 97116 GAIT TRAINING THERAPY: CPT

## 2021-09-06 PROCEDURE — 1200000000 HC SEMI PRIVATE

## 2021-09-06 PROCEDURE — 6370000000 HC RX 637 (ALT 250 FOR IP): Performed by: INTERNAL MEDICINE

## 2021-09-06 PROCEDURE — 85027 COMPLETE CBC AUTOMATED: CPT

## 2021-09-06 PROCEDURE — 80061 LIPID PANEL: CPT

## 2021-09-06 PROCEDURE — 99232 SBSQ HOSP IP/OBS MODERATE 35: CPT | Performed by: NURSE PRACTITIONER

## 2021-09-06 RX ORDER — ATORVASTATIN CALCIUM 80 MG/1
80 TABLET, FILM COATED ORAL DAILY
Status: DISCONTINUED | OUTPATIENT
Start: 2021-09-07 | End: 2021-09-11 | Stop reason: HOSPADM

## 2021-09-06 RX ORDER — SODIUM CHLORIDE, SODIUM LACTATE, POTASSIUM CHLORIDE, AND CALCIUM CHLORIDE .6; .31; .03; .02 G/100ML; G/100ML; G/100ML; G/100ML
1000 INJECTION, SOLUTION INTRAVENOUS ONCE
Status: COMPLETED | OUTPATIENT
Start: 2021-09-07 | End: 2021-09-07

## 2021-09-06 RX ORDER — PROMETHAZINE HYDROCHLORIDE 25 MG/ML
12.5 INJECTION, SOLUTION INTRAMUSCULAR; INTRAVENOUS ONCE
Status: DISCONTINUED | OUTPATIENT
Start: 2021-09-06 | End: 2021-09-06

## 2021-09-06 RX ORDER — PROMETHAZINE HYDROCHLORIDE 25 MG/ML
25 INJECTION, SOLUTION INTRAMUSCULAR; INTRAVENOUS ONCE
Status: COMPLETED | OUTPATIENT
Start: 2021-09-06 | End: 2021-09-06

## 2021-09-06 RX ADMIN — ONDANSETRON 4 MG: 4 TABLET, ORALLY DISINTEGRATING ORAL at 15:50

## 2021-09-06 RX ADMIN — CLOPIDOGREL BISULFATE 75 MG: 75 TABLET ORAL at 09:16

## 2021-09-06 RX ADMIN — LOPERAMIDE HYDROCHLORIDE 2 MG: 2 CAPSULE ORAL at 22:18

## 2021-09-06 RX ADMIN — ONDANSETRON 4 MG: 2 INJECTION INTRAMUSCULAR; INTRAVENOUS at 16:56

## 2021-09-06 RX ADMIN — ENOXAPARIN SODIUM 40 MG: 40 INJECTION SUBCUTANEOUS at 09:16

## 2021-09-06 RX ADMIN — ATORVASTATIN CALCIUM 40 MG: 40 TABLET, FILM COATED ORAL at 09:16

## 2021-09-06 RX ADMIN — PANTOPRAZOLE SODIUM 40 MG: 40 TABLET, DELAYED RELEASE ORAL at 06:28

## 2021-09-06 RX ADMIN — DULOXETINE HYDROCHLORIDE 60 MG: 60 CAPSULE, DELAYED RELEASE ORAL at 09:16

## 2021-09-06 RX ADMIN — INSULIN LISPRO 1 UNITS: 100 INJECTION, SOLUTION INTRAVENOUS; SUBCUTANEOUS at 21:38

## 2021-09-06 RX ADMIN — PROMETHAZINE HYDROCHLORIDE 25 MG: 25 INJECTION INTRAMUSCULAR; INTRAVENOUS at 21:48

## 2021-09-06 RX ADMIN — Medication 10 ML: at 09:21

## 2021-09-06 RX ADMIN — ASPIRIN 81 MG: 81 TABLET, COATED ORAL at 09:16

## 2021-09-06 ASSESSMENT — PAIN SCALES - GENERAL
PAINLEVEL_OUTOF10: 0
PAINLEVEL_OUTOF10: 0

## 2021-09-06 NOTE — PROGRESS NOTES
Occupational Therapy  D/C    Signed off prior to eval. Pt IND. Per conversation and PT notes, and patient, pt is functioning at baseline. Pt independent prior to admission, presenting as independent this date with all functional tasks and mobility. Expressing no needs at this time.  Signed off prior to eval.     Saleem Hall, ED, OTR/L

## 2021-09-06 NOTE — PROGRESS NOTES
Hospitalist Progress Note      PCP: Berneice Brittle, MD    Date of Admission: 9/5/2021    Chief Complaint: Confusion, right arm weakness    Hospital Course: Admitted for right-sided weakness with concern to TIA/stroke. Also had diarrhea. C. difficile negative. GI PCR pending. Neurology following. Subjective: Patient states that his right sided arm weakness has improved. But is still having diarrhea. Started cholestyramine but not as good. Does not want to take it anymore. Only took 1 dose of Imodium. He states that usually Imodium works well and he does not require more than 1 dose. Discussed with patient that given her ongoing diarrhea he should try another dose. Patient agreeable. Discussed with RN.       Medications:  Reviewed    Infusion Medications    dextrose      sodium chloride       Scheduled Medications    aspirin  81 mg Oral Daily    insulin lispro  0-12 Units SubCUTAneous TID WC    insulin lispro  0-6 Units SubCUTAneous Nightly    atorvastatin  40 mg Oral Daily    clopidogrel  75 mg Oral Daily    DULoxetine  60 mg Oral Daily    pantoprazole  40 mg Oral QAM AC    sodium chloride flush  5-40 mL IntraVENous 2 times per day    enoxaparin  40 mg SubCUTAneous Daily    budesonide  0.25 mg Nebulization BID    And    Arformoterol Tartrate  15 mcg Nebulization BID    cholestyramine light  4 g Oral BID     PRN Meds: glucose, dextrose, glucagon (rDNA), dextrose, sodium chloride flush, sodium chloride, ondansetron **OR** ondansetron, polyethylene glycol, perflutren lipid microspheres, loperamide      Intake/Output Summary (Last 24 hours) at 9/6/2021 0838  Last data filed at 9/6/2021 0600  Gross per 24 hour   Intake 360 ml   Output --   Net 360 ml       Physical Exam Performed:    /73   Pulse 83   Temp 98 °F (36.7 °C) (Oral)   Resp 16   Ht 5' 11\" (1.803 m)   Wt 220 lb (99.8 kg)   SpO2 97%   BMI 30.68 kg/m²     General appearance: No apparent distress, appears stated age and cooperative. HEENT: Pupils equal, round, and reactive to light. Conjunctivae/corneas clear. Neck: Supple, with full range of motion. No jugular venous distention. Trachea midline. Respiratory:  Normal respiratory effort. Clear to auscultation, bilaterally without Rales/Wheezes/Rhonchi. Cardiovascular: Regular rate and rhythm with normal S1/S2 without murmurs, rubs or gallops. Abdomen: Soft, non-tender, non-distended with normal bowel sounds. Musculoskeletal: No clubbing, cyanosis or edema bilaterally. Full range of motion without deformity. Skin: Skin color, texture, turgor normal.  No rashes or lesions. Neurologic:  Neurovascularly intact without any focal sensory/motor deficits. Cranial nerves: II-XII intact, grossly non-focal.  Psychiatric: Alert and oriented, thought content appropriate, normal insight  Capillary Refill: Brisk,< 3 seconds   Peripheral Pulses: +2 palpable, equal bilaterally       Labs:   Recent Labs     09/05/21  0234 09/06/21  0618   WBC 15.3* 10.9   HGB 14.8 13.9   HCT 45.0 40.4*    167     Recent Labs     09/05/21  0234      K 4.1      CO2 26   BUN 13   CREATININE 1.5*   CALCIUM 10.4     Recent Labs     09/05/21  0234   AST 22   ALT 20   BILITOT 1.1*   ALKPHOS 94     Recent Labs     09/05/21  0234   INR 1.00     Recent Labs     09/05/21  0234   TROPONINI <0.01       Urinalysis:      Lab Results   Component Value Date    NITRU Negative 10/16/2020    WBCUA None seen 10/16/2020    BACTERIA 1+ 02/22/2012    RBCUA  10/16/2020    BLOODU LARGE 10/16/2020    SPECGRAV 1.010 10/16/2020    GLUCOSEU 100 10/16/2020    GLUCOSEU NEGATIVE 02/22/2012       Radiology:  MRA NECK WO CONTRAST   Final Result      1. No acute intracranial infarct. Chronic left frontal lobe infarct.    2.  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 Decreased    faint opacification of the left middle cerebral artery is new compared to 2012. Recommend neurology and vascular surgery consultation. Findings were discussed with the patient's [facility] by Dr. Everardo Meza on 9/5/2021 at 1020 hours. MRI brain without contrast   Final Result      1. No acute intracranial infarct. Chronic left frontal lobe infarct. 2.  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 Decreased    faint opacification of the left middle cerebral artery is new compared to 2012. Recommend neurology and vascular surgery consultation. Findings were discussed with the patient's [facility] by Dr. Everardo Meza on 9/5/2021 at 1020 hours. MRA HEAD WO CONTRAST   Final Result      1. No acute intracranial infarct. Chronic left frontal lobe infarct. 2.  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 Decreased    faint opacification of the left middle cerebral artery is new compared to 2012. Recommend neurology and vascular surgery consultation. Findings were discussed with the patient's [facility] by Dr. Everardo Meza on 9/5/2021 at 1020 hours. Assessment/Plan:    #Transient Right-sided arm weakness with history of multiple TIAs  MRI/MRA showed no acute intracranial infarct. Chronic left frontal lobe infarct. 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   Decreased faint opacification of the left middle cerebral artery. Neurosurgery consulted  Continue telemetry  Hemoglobin A1c 6.7  Lipid panel and echo pending  Continue aspirin, Plavix   Continue statin, dose increased per neurology  Maintain normotension, avoid hypotension  Long-term cardiac monitoritoring to assess for A. Fib, cardiology consulted  Neurology following, appreciate recommendation    #Diarrhea C. difficile negative  Hx of c diff diarrhea treated recently  GI PCR pending  Patient does not want to take Questran  Continue as needed Imodium     #Leukocytosis likely reactive-resolved  Chest x-ray without any consolidation. UA negative. We will continue to monitor     #Diabetes mellitus type 2  HbA1c in July 6.7%  On Metformin at home  Monitor blood glucose  Continue carb controlled diet with sliding scale insulin     #CKD stage III  Creatinine 1.5 appears to be baseline.     #Colon cancer status post colectomy   Currently on clinical trial at [facility].     #Active tobacco abuse  Still smokes half pack per day. Counseled guarding smoking cessation. Patient reported that he had tried nicotine patch and Chantix did not help.     DVT Prophylaxis: lovenox  Diet: ADULT DIET;  Regular; 4 carb choices (60 gm/meal)  Code Status: Full Code    Dispo -pending clinical improvement, cardiology/neurosurgery consult, neuro Tu Plaza MD

## 2021-09-06 NOTE — PROGRESS NOTES
and colon cancer  who presented with recurrent, transient right face and arm weakness lasting about 45 minutes - weakness reportedly worse than his initial stroke. He has had this happen several times in the past but the last time was before March 2020. He is on asa and Plavix for carotid stenosis. Per chart review, he was last seen by vascular surgery in September 2020 - and had admitted to having symptoms in the past with dehydration.      Recommendations:  - Neurosurgical consultation; unlikely to have any surgical options given complete occlusion (bypass?)  - Continue asa and Plavix as benefit outweighs risk  - Increase statin to 80 mg qhs  - Maintain normotension; avoid hypotension.   - If symptoms recur, lay flat, administer IVF, obtain STAT CT/CT-A and page neurology   - Awaiting echocardiogram  - Long term cardiac monitor would be reasonable to assess for afib    A copy of this note was provided for Dr Freedom Bradford MD.     Caron Baron NP  [facility] Neurology line: [phone]  Perfect[employer]erve: [facility] Neurology & Neurocritical Care NPs

## 2021-09-06 NOTE — PLAN OF CARE
Problem: Falls - Risk of:  Goal: Will remain free from falls  9/6/2021 0545 by Edward Eden RN  Outcome: Ongoing   Ambulating well with strong steady gait. Calls out appropriately. Bed locked in lowest position. Call light/belongings within reach. Problem: HEMODYNAMIC STATUS  Goal: Patient has stable vital signs and fluid balance  Outcome: Ongoing  Vitals stable. Problem: ACTIVITY INTOLERANCE/IMPAIRED MOBILITY  Goal: Mobility/activity is maintained at optimum level for patient  Outcome: Ongoing  Tolerating activity well and independently. Problem: COMMUNICATION IMPAIRMENT  Goal: Ability to express needs and understand communication  Outcome: Ongoing   Able to express needs and understand communication with no deficits.

## 2021-09-06 NOTE — PROGRESS NOTES
Wife called. She's concerned that the patient had vomit that smelled like stool (before he came in) and that his belch smelled like stool. She said the last time this happened he was dx with colon cancer. She's wondering if it's related to the diarrhea? Patient says he does have hx of colon cancer and is in a clinical trial at [facility]). States he had CT scan about a month ago as part of his trial, and at that time says he had pancreatitis and gallbladder issue. Forwarded this info to attending.

## 2021-09-06 NOTE — PROGRESS NOTES
Physical Therapy    [facility]  Initial Assessment and treatment/discharge    NAME: Roe Forrester  : 1950  MRN: [id number]    Date of Service: 2021    Discharge Recommendations:    Roe Forrester scored a 19/21 on the AM-PAC short mobility form. At this time, no further PT is recommended upon discharge due to patient independent with mobility. Recommend patient returns to prior setting with prior services. PT Equipment Recommendations  Equipment Needed: No    Assessment   Assessment: Patient tolerated session well, tranferring and ambulating in room and hallways as above with steady gait without an assistive device. Patient able to perform stair training without deficits and reports at baseline mobility with no concerns regarding d/c home. Spoke with patient regarding order for OT evaluation; patient denies needs for OT services. Patient planning to d/c home with assistance as needed from family. Will sign off from acute care PT at this time as patient is independent with mobility. Treatment Diagnosis: impaired mobility associated with TIA  Prognosis: Good  Decision Making: Low Complexity  PT Education: PT Role;Plan of Care;General Safety; Functional Mobility Training;Transfer Training;Gait Training  Patient Education: patient verbalized understanding. No Skilled PT: Independent with functional mobility   REQUIRES PT FOLLOW UP: No  Activity Tolerance  Activity Tolerance: Patient Tolerated treatment well       Patient Diagnosis(es): There were no encounter diagnoses. has a past medical history of Cerebral artery occlusion with cerebral infarction [facility]), Depression, and Hypertension. has a past surgical history that includes Nose surgery; Salpingo-oophorectomy; and [place] tooth extraction.     Restrictions  Position Activity Restriction  Other position/activity restrictions: up as tolerated  Vision/Hearing  Vision: Impaired  Vision Exceptions: Wears glasses at all times  Hearing: Within functional limits     Subjective  General  Chart Reviewed: Yes  Patient assessed for rehabilitation services?: Yes  Additional Pertinent Hx: Patient is a 74 y/o male admitted 9/5 with transient weakness of the RUE, confusion, and diarrhea. MRI brain (-) for acute findings. PMH significant cerbral infarction. Response To Previous Treatment: Not applicable  Family / Caregiver Present: No  Referring Practitioner: Lisbeth Malone MD  Referral Date : 09/05/21  Diagnosis: TIA  Follows Commands: Within Functional Limits  General Comment  Comments: Patient supine in bed upon arrival.  Subjective  Subjective: Patient agreeable to PT evaluation. Pain Screening  Patient Currently in Pain: Denies  Vital Signs  Patient Currently in Pain: Denies       Orientation  Orientation  Overall Orientation Status: Within Normal Limits  Social/Functional History  Social/Functional History  Lives With: Family (wife and daughter)  Type of Home: House  Home Layout: One level  Home Access: Stairs to enter without rails  Entrance Stairs - Number of Steps: 2-3  Bathroom Shower/Tub: Tub/Shower unit  Bathroom Toilet: Standard  Bathroom Equipment:  (none)  Home Equipment:  (walking stick)  ADL Assistance: [facility]: Independent (shares with wife)  Homemaking Responsibilities: Yes  Ambulation Assistance: Independent  Transfer Assistance: Independent  Active : Yes  Occupation: Retired  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  Additional Comments: patient denies history of falls at home  [employer]  Overall Cognitive Status: WNL    Objective          AROM RLE (degrees)  RLE AROM: WFL  AROM LLE (degrees)  LLE AROM : WFL  Strength RLE  Strength RLE: WFL  Strength LLE  Strength LLE: WFL        Bed mobility  Supine to Sit: Independent  Scooting: Independent  Transfers  Sit to Stand: Independent (from EOB)  Stand to sit:  Independent (to bedside chair)  Ambulation  Ambulation?: Yes  Ambulation 1  Surface: level tile  Device: No Device  Assistance: Independent  Quality of Gait: steady gait with no loss of balance noted, deep and mechanics WFL  Distance: 350' in room and hallways returning to bedside chair  Stairs/Curb  Stairs?: Yes  Stairs  # Steps : 2  Stairs Height: 6\"  Rails: None  Device: No Device  Assistance: Independent  Comment: reciprocal pattern ascending and descending, steady gait on stairs     Balance  Sitting - Static: Good  Sitting - Dynamic: Good  Standing - Static: Good  Standing - Dynamic: Good        Plan   Plan  Times per week: d/c from acute care PT  Safety Devices  Type of devices: Left in chair, Call light within reach, Nurse notified (patient up ad bhavna per RN)    G-Code       OutComes Score                                                  AM-PAC Score  AM-PAC Inpatient Mobility Raw Score : 24 (09/06/21 0943)  AM-PAC Inpatient T-Scale Score : 61.14 (09/06/21 0943)  Mobility Inpatient CMS 0-100% Score: 0 (09/06/21 0943)  Mobility Inpatient CMS G-Code Modifier : [address] (09/06/21 4319)          Goals  Short term goals  Time Frame for Short term goals: none; patient will be discharged from acute care PT  Patient Goals   Patient goals : to go home       Therapy Time   Individual Concurrent Group Co-treatment   Time In 0839         Time Out 0902         Minutes 23         Timed Code Treatment Minutes: 8 Minutes    Timed Code Treatment Minutes:  8 Minutes    Total Treatment Minutes:    8 minutes treatment + 15 minutes evaluation = 23 total treatment minutes    [place], [place] [id number]

## 2021-09-06 NOTE — PLAN OF CARE
Problem: Falls - Risk of:  Goal: Will remain free from falls  Description: Will remain free from falls  9/6/2021 1225 by Alanis Stevens RN  Outcome: Ongoing  Patient at risk for falls. Patient resting quietly in bed. Side rails up x 2. Bed locked in lowest position. Bedside table and call light within reach. Patient instructed to call for assistance. Patient verbalized understanding. Will continue to monitor. Problem: HEMODYNAMIC STATUS  Goal: Patient has stable vital signs and fluid balance  Outcome: Ongoing     Problem: ACTIVITY INTOLERANCE/IMPAIRED MOBILITY  Goal: Mobility/activity is maintained at optimum level for patient  9/6/2021 1225 by Alanis Stevens RN  Outcome: Ongoing  Tolerating ambulation.

## 2021-09-06 NOTE — PROGRESS NOTES
Patient a/o x4. Vitals stable. Denies pain. Diarrhea continues overnight. No nausea/vomiting. Results from MRI/MRA reported to MD. Resting fairly well overnight.

## 2021-09-07 ENCOUNTER — APPOINTMENT (OUTPATIENT)
Dept: CT IMAGING | Age: 71
DRG: 068 | End: 2021-09-07
Attending: INTERNAL MEDICINE
Payer: MEDICARE

## 2021-09-07 PROBLEM — R29.898 RIGHT ARM WEAKNESS: Status: ACTIVE | Noted: 2021-09-07

## 2021-09-07 LAB
ANION GAP SERPL CALCULATED.3IONS-SCNC: 11 MMOL/L (ref 3–16)
ANION GAP SERPL CALCULATED.3IONS-SCNC: 13 MMOL/L (ref 3–16)
BASOPHILS ABSOLUTE: 0 K/UL (ref 0–0.2)
BASOPHILS RELATIVE PERCENT: 0.3 %
BUN BLDV-MCNC: 25 MG/DL (ref 7–20)
BUN BLDV-MCNC: 26 MG/DL (ref 7–20)
CALCIUM SERPL-MCNC: 8.5 MG/DL (ref 8.3–10.6)
CALCIUM SERPL-MCNC: 8.5 MG/DL (ref 8.3–10.6)
CHLORIDE BLD-SCNC: 100 MMOL/L (ref 99–110)
CHLORIDE BLD-SCNC: 97 MMOL/L (ref 99–110)
CO2: 21 MMOL/L (ref 21–32)
CO2: 23 MMOL/L (ref 21–32)
CREAT SERPL-MCNC: 1.5 MG/DL (ref 0.8–1.3)
CREAT SERPL-MCNC: 1.6 MG/DL (ref 0.8–1.3)
EKG ATRIAL RATE: 79 BPM
EKG DIAGNOSIS: NORMAL
EKG P AXIS: 58 DEGREES
EKG P-R INTERVAL: 156 MS
EKG Q-T INTERVAL: 386 MS
EKG QRS DURATION: 94 MS
EKG QTC CALCULATION (BAZETT): 442 MS
EKG R AXIS: 31 DEGREES
EKG T AXIS: 61 DEGREES
EKG VENTRICULAR RATE: 79 BPM
EOSINOPHILS ABSOLUTE: 0.1 K/UL (ref 0–0.6)
EOSINOPHILS RELATIVE PERCENT: 1.5 %
ESTIMATED AVERAGE GLUCOSE: 142.7 MG/DL
GFR AFRICAN AMERICAN: 52
GFR AFRICAN AMERICAN: 56
GFR NON-AFRICAN AMERICAN: 43
GFR NON-AFRICAN AMERICAN: 46
GLUCOSE BLD-MCNC: 117 MG/DL (ref 70–99)
GLUCOSE BLD-MCNC: 119 MG/DL (ref 70–99)
GLUCOSE BLD-MCNC: 123 MG/DL (ref 70–99)
GLUCOSE BLD-MCNC: 130 MG/DL (ref 70–99)
GLUCOSE BLD-MCNC: 146 MG/DL (ref 70–99)
GLUCOSE BLD-MCNC: 164 MG/DL (ref 70–99)
GLUCOSE BLD-MCNC: 169 MG/DL (ref 70–99)
HBA1C MFR BLD: 6.6 %
HCT VFR BLD CALC: 42.8 % (ref 40.5–52.5)
HEMOGLOBIN: 14.8 G/DL (ref 13.5–17.5)
LACTIC ACID: 1.3 MMOL/L (ref 0.4–2)
LIPASE: 52 U/L (ref 13–60)
LV EF: 43 %
LVEF MODALITY: NORMAL
LYMPHOCYTES ABSOLUTE: 1 K/UL (ref 1–5.1)
LYMPHOCYTES RELATIVE PERCENT: 10 %
MAGNESIUM: 1.5 MG/DL (ref 1.8–2.4)
MAGNESIUM: 1.6 MG/DL (ref 1.8–2.4)
MCH RBC QN AUTO: 30.5 PG (ref 26–34)
MCHC RBC AUTO-ENTMCNC: 34.6 G/DL (ref 31–36)
MCV RBC AUTO: 88.4 FL (ref 80–100)
MONOCYTES ABSOLUTE: 0.9 K/UL (ref 0–1.3)
MONOCYTES RELATIVE PERCENT: 9.1 %
NEUTROPHILS ABSOLUTE: 7.6 K/UL (ref 1.7–7.7)
NEUTROPHILS RELATIVE PERCENT: 79.1 %
PDW BLD-RTO: 14 % (ref 12.4–15.4)
PERFORMED ON: ABNORMAL
PLATELET # BLD: 188 K/UL (ref 135–450)
PMV BLD AUTO: 11.9 FL (ref 5–10.5)
POTASSIUM REFLEX MAGNESIUM: 3.3 MMOL/L (ref 3.5–5.1)
POTASSIUM REFLEX MAGNESIUM: 3.3 MMOL/L (ref 3.5–5.1)
RBC # BLD: 4.84 M/UL (ref 4.2–5.9)
SARS-COV-2, NAAT: NOT DETECTED
SODIUM BLD-SCNC: 131 MMOL/L (ref 136–145)
SODIUM BLD-SCNC: 134 MMOL/L (ref 136–145)
TSH REFLEX FT4: 0.95 UIU/ML (ref 0.27–4.2)
WBC # BLD: 9.6 K/UL (ref 4–11)

## 2021-09-07 PROCEDURE — 2580000003 HC RX 258: Performed by: INTERNAL MEDICINE

## 2021-09-07 PROCEDURE — 82306 VITAMIN D 25 HYDROXY: CPT

## 2021-09-07 PROCEDURE — 99223 1ST HOSP IP/OBS HIGH 75: CPT | Performed by: SURGERY

## 2021-09-07 PROCEDURE — 83605 ASSAY OF LACTIC ACID: CPT

## 2021-09-07 PROCEDURE — 74177 CT ABD & PELVIS W/CONTRAST: CPT

## 2021-09-07 PROCEDURE — APPSS45 APP SPLIT SHARED TIME 31-45 MINUTES: Performed by: NURSE PRACTITIONER

## 2021-09-07 PROCEDURE — 2060000000 HC ICU INTERMEDIATE R&B

## 2021-09-07 PROCEDURE — 83735 ASSAY OF MAGNESIUM: CPT

## 2021-09-07 PROCEDURE — 80048 BASIC METABOLIC PNL TOTAL CA: CPT

## 2021-09-07 PROCEDURE — 84443 ASSAY THYROID STIM HORMONE: CPT

## 2021-09-07 PROCEDURE — 87635 SARS-COV-2 COVID-19 AMP PRB: CPT

## 2021-09-07 PROCEDURE — 6370000000 HC RX 637 (ALT 250 FOR IP): Performed by: NURSE PRACTITIONER

## 2021-09-07 PROCEDURE — 6360000002 HC RX W HCPCS: Performed by: INTERNAL MEDICINE

## 2021-09-07 PROCEDURE — 99223 1ST HOSP IP/OBS HIGH 75: CPT | Performed by: INTERNAL MEDICINE

## 2021-09-07 PROCEDURE — 2580000003 HC RX 258: Performed by: SURGERY

## 2021-09-07 PROCEDURE — 36415 COLL VENOUS BLD VENIPUNCTURE: CPT

## 2021-09-07 PROCEDURE — C8929 TTE W OR WO FOL WCON,DOPPLER: HCPCS

## 2021-09-07 PROCEDURE — 6360000004 HC RX CONTRAST MEDICATION: Performed by: INTERNAL MEDICINE

## 2021-09-07 PROCEDURE — 93005 ELECTROCARDIOGRAM TRACING: CPT | Performed by: INTERNAL MEDICINE

## 2021-09-07 PROCEDURE — 85025 COMPLETE CBC W/AUTO DIFF WBC: CPT

## 2021-09-07 PROCEDURE — 83690 ASSAY OF LIPASE: CPT

## 2021-09-07 PROCEDURE — 1200000000 HC SEMI PRIVATE

## 2021-09-07 PROCEDURE — 6370000000 HC RX 637 (ALT 250 FOR IP): Performed by: INTERNAL MEDICINE

## 2021-09-07 PROCEDURE — C9113 INJ PANTOPRAZOLE SODIUM, VIA: HCPCS | Performed by: INTERNAL MEDICINE

## 2021-09-07 RX ORDER — LANOLIN ALCOHOL/MO/W.PET/CERES
400 CREAM (GRAM) TOPICAL DAILY
Status: DISCONTINUED | OUTPATIENT
Start: 2021-09-07 | End: 2021-09-09

## 2021-09-07 RX ORDER — 0.9 % SODIUM CHLORIDE 0.9 %
1000 INTRAVENOUS SOLUTION INTRAVENOUS ONCE
Status: DISCONTINUED | OUTPATIENT
Start: 2021-09-07 | End: 2021-09-11 | Stop reason: HOSPADM

## 2021-09-07 RX ORDER — SODIUM CHLORIDE 9 MG/ML
INJECTION, SOLUTION INTRAVENOUS CONTINUOUS
Status: DISCONTINUED | OUTPATIENT
Start: 2021-09-07 | End: 2021-09-11 | Stop reason: HOSPADM

## 2021-09-07 RX ORDER — PANTOPRAZOLE SODIUM 40 MG/10ML
40 INJECTION, POWDER, LYOPHILIZED, FOR SOLUTION INTRAVENOUS 2 TIMES DAILY
Status: DISCONTINUED | OUTPATIENT
Start: 2021-09-07 | End: 2021-09-11 | Stop reason: HOSPADM

## 2021-09-07 RX ADMIN — Medication 10 ML: at 20:47

## 2021-09-07 RX ADMIN — ATORVASTATIN CALCIUM 80 MG: 80 TABLET, FILM COATED ORAL at 09:18

## 2021-09-07 RX ADMIN — SODIUM CHLORIDE, POTASSIUM CHLORIDE, SODIUM LACTATE AND CALCIUM CHLORIDE 1000 ML: 600; 310; 30; 20 INJECTION, SOLUTION INTRAVENOUS at 00:42

## 2021-09-07 RX ADMIN — PANTOPRAZOLE SODIUM 40 MG: 40 INJECTION, POWDER, FOR SOLUTION INTRAVENOUS at 20:46

## 2021-09-07 RX ADMIN — INSULIN LISPRO 2 UNITS: 100 INJECTION, SOLUTION INTRAVENOUS; SUBCUTANEOUS at 12:03

## 2021-09-07 RX ADMIN — CLOPIDOGREL BISULFATE 75 MG: 75 TABLET ORAL at 09:19

## 2021-09-07 RX ADMIN — Medication 10 ML: at 09:20

## 2021-09-07 RX ADMIN — ENOXAPARIN SODIUM 40 MG: 40 INJECTION SUBCUTANEOUS at 09:18

## 2021-09-07 RX ADMIN — IOPAMIDOL 80 ML: 755 INJECTION, SOLUTION INTRAVENOUS at 00:32

## 2021-09-07 RX ADMIN — ONDANSETRON 4 MG: 2 INJECTION INTRAMUSCULAR; INTRAVENOUS at 03:16

## 2021-09-07 RX ADMIN — Medication 400 MG: at 09:28

## 2021-09-07 RX ADMIN — DULOXETINE HYDROCHLORIDE 60 MG: 60 CAPSULE, DELAYED RELEASE ORAL at 09:19

## 2021-09-07 RX ADMIN — PANTOPRAZOLE SODIUM 40 MG: 40 TABLET, DELAYED RELEASE ORAL at 06:31

## 2021-09-07 RX ADMIN — ASPIRIN 81 MG: 81 TABLET, COATED ORAL at 09:19

## 2021-09-07 RX ADMIN — SODIUM CHLORIDE: 9 INJECTION, SOLUTION INTRAVENOUS at 16:13

## 2021-09-07 ASSESSMENT — PAIN SCALES - GENERAL
PAINLEVEL_OUTOF10: 0
PAINLEVEL_OUTOF10: 0

## 2021-09-07 NOTE — CONSULTS
[address]   Cardiology Consult Note   Dr Ludy Pacheco MD, Murtaza Bailey FNP APRN CVNP  Date: 9/7/2021  Admit Date: 9/5/2021       Reason for consultation:long term monitor    CC: Confusion, right arm weakness    History obtained from patient and medical record. Primary cardiologist:  Dr. Oscar Granado     HPI: Gordo Jackson is a 73 y.o. male with a past medical history of carotid occlusion left noted in 2012 and he had CEA in 2012 right,tobacco use, CRD  DMT2, colon ca multiple TIA's presented wth n/v diarrhea, right sided weakness with confusion,  no c/o cp/SOB or edema    EKG NSR nonspecific ST changes    sCr 1.6>1.5  Stable   Magnesium 1.5>1.6  remains on low side   Trop neg x 1   TTE pending     9/5/2021 MRA neck   1.  No acute intracranial infarct. Chronic left frontal lobe infarct. 2.  Chronic complete occlusion of the left internal carotid artery at its origin, also present on CTA dated 2/23/2012 and on report from outside hospital CTA dated 8/7/2020, with faint reconstitution at its terminus via the [place]. Decreased    faint opacification of the left middle cerebral artery is new compared to 2012. Recommend neurology and vascular surgery consultation. CT abdomen today    1.  Prominent air in the gastric wall with possible portal venous air or    subtle extraluminal air. 2.  Fluid in the small and large bowel, can be seen with enteritis or    diarrheal disease. 3.  Nonspecific stranding/nodularity inferior to the pancreas.     Differential considerations include inflammatory/infectious or neoplastic    process. Interval Hx: Today, he is resting quietly,    No new complaints today. No major events overnight. Denies having chest pain, palpitations, shortness of breath, orthopnea/PND, cough, or dizziness at the time of this visit. Last night 12:30 am pt had a syncopal event going to CT / bolus IV fluids given VSS EKG completed     Patient seen and examined. Clinical notes reviewed. Telemetry reviewed / testing reviewed  30 minutes   Pertinent labs, diagnostic, device, and imaging results reviewed as a part of this visit  EKG TTE stress test: any LHC/RHC reviewed     Past Medical History:  Past Medical History:   Diagnosis Date    Cerebral artery occlusion with cerebral infarction ([facility] [address]     Depression     Hypertension         Past Surgical History:    has a past surgical history that includes Nose surgery; Salpingo-oophorectomy; and [place] tooth extraction. Social History:  Reviewed. reports that he has been smoking cigarettes. He has been smoking about 0.50 packs per day. He has never used smokeless tobacco. He reports current alcohol use. He reports that he does not use drugs. Allergies:  No Known Allergies    Family History:  Reviewed. family history is not on file. Denies family history of sudden cardiac death, arrhythmia, premature CAD    Home Meds:  Prior to Visit Medications    Medication Sig Taking? Authorizing Provider   metFORMIN (GLUCOPHAGE) 500 MG tablet Take 500 mg by mouth 2 times daily (with meals) Yes Historical Provider, MD   ondansetron (ZOFRAN) 4 MG tablet Take 1 tablet by mouth every 8 hours as needed for Nausea Yes Karen Castro PA-C   aspirin 81 MG EC tablet Take 81 mg by mouth Yes Historical Provider, MD   atorvastatin (LIPITOR) 40 MG tablet TAKE 1 TABLET BY MOUTH DAILY. Yes Historical Provider, MD   clopidogrel (PLAVIX) 75 MG tablet TAKE 1 TAB BY MOUTH DAILY. Yes Historical Provider, MD   DULoxetine (CYMBALTA) 60 MG extended release capsule TAKE ONE CAPSULE BY MOUTH EVERY DAY Yes Historical Provider, MD   Multiple Vitamins-Minerals (MULTIVITAMIN ADULT EXTRA C PO) Take by mouth Yes Historical Provider, MD   pantoprazole (PROTONIX) 40 MG tablet TAKE 1 TAB BY MOUTH DAILY. Yes Historical Provider, MD   naproxen (NAPROSYN) 500 MG tablet Take 1 tablet by mouth 2 times daily.  Yes Ray Barrientos DO   tamsulosin (FLOMAX) 0.4 MG capsule Take 1 capsule by mouth daily for 5 doses  Blaine Fuchs APRN - CNP   budesonide-formoterol (SYMBICORT) 80-4.5 MCG/ACT AERO Inhale 1 puff into the lungs  Historical Provider, MD        Scheduled Meds:   sodium chloride  1,000 mL IntraVENous Once    atorvastatin  80 mg Oral Daily    aspirin  81 mg Oral Daily    insulin lispro  0-12 Units SubCUTAneous TID WC    insulin lispro  0-6 Units SubCUTAneous Nightly    clopidogrel  75 mg Oral Daily    DULoxetine  60 mg Oral Daily    pantoprazole  40 mg Oral QAM AC    sodium chloride flush  5-40 mL IntraVENous 2 times per day    enoxaparin  40 mg SubCUTAneous Daily    budesonide  0.25 mg Nebulization BID    And    Arformoterol Tartrate  15 mcg Nebulization BID    cholestyramine light  4 g Oral BID     Continuous Infusions:   dextrose      sodium chloride         Review of Systems:  · Constitutional: Negative for fever, night sweats, chills, weight changes  · Skin: Negative for rash, pruritus, bleeding, blood clots, or bruising    · HEENT: Negative for vision changes, ringing in the ears, dysphagia, or swollen lymph nodes  · Respiratory: Reviewed in HPI  · Cardiovascular: Reviewed in HPI  · Gastrointestinal: Negative for abdominal pain, N/V/D, constipation, or black/tarry stools  · Genito-Urinary: Negative for dysuria, incontinence, or hematuria  · Musculoskeletal: Negative for joint swelling, muscle pain, or injuries  · Neurological/Psych: Negative for confusion, seizures, headaches, or TIA-like symptoms. No anxiety or depression. Physical Examination:  Vitals:    09/07/21 0700   BP: 130/80   Pulse: 87   Resp: 17   Temp: 98 °F (36.7 °C)   SpO2: 96%      No intake/output data recorded.    Wt Readings from Last 3 Encounters:   09/05/21 220 lb (99.8 kg)   09/05/21 220 lb (99.8 kg)   01/21/20 220 lb (99.8 kg)       Intake/Output Summary (Last 24 hours) at 9/7/2021 0806  Last data filed at 9/7/2021 0250  Gross per 24 hour   Intake 740 ml   Output --   Net 740 09/05/2021    INR 1.00 09/05/2021         Problem List:   Patient Active Problem List    Diagnosis Date Noted    Cerebral infarction ([facility] [address] 02/22/2012    TIA (transient ischemic attack) 09/05/2021      Assessment     Confusion, right arm weakness  Hx carotid occlusion left noted in 2012 and he had CEA in 2012 right  EKG NSR nonspecific ST changes    MRI/MRA showed no acute intracranial infarct. Chronic left frontal lobe infarct. Chronic complete occlusion of the left internal carotid artery at its origin present since 2012 with faint reconstitution at its terminus via the [place]. Decreased faint opacification of the left middle cerebral artery. 9/5/2021 MRA neck   1.  No acute intracranial infarct. Chronic left frontal lobe infarct. 2.  Chronic complete occlusion of the left internal carotid artery at its origin, also present on CTA dated 2/23/2012 and on report from outside hospital CTA dated 8/7/2020, with faint reconstitution at its terminus via the [place]. Decreased    faint opacification of the left middle cerebral artery is new compared to 2012. Recommend neurology and vascular surgery consultation. CT HEAD WO CONTRAST--  9/5/2021   BRAIN/VENTRICLES: There is no acute intracranial hemorrhage, mass effect or  midline shift. No abnormal extra-axial fluid collection. The gray-white  differentiation is maintained without evidence of an acute infarct. Redemonstration of a area of low attenuation in the left frontal white matter  associated with some ex vacuole dilatation of the left lateral ventricle. Features are stable and suggest area of old infarct. There is no evidence of  Hydrocephalus. Impression---No acute intracranial abnormality.  Stable area of old infarct in the left frontal white matter    ALIVIA  / CRI   sCr 1.6>1.5  Stable     Low magnesium   Magnesium 1.5>1.6  remains on low side   Replace     DMT2  Managed per IM      Hx TIA's  On asa plavix     Tobacco use  Cessation discussed     HLD   LDL 45 optimal   On statin     Colon cancer status post colectomy   Currently on clinical trial at . CT abdomen 9/7/2021    1.  Prominent air in the gastric wall with possible portal venous air or    subtle extraluminal air. 2.  Fluid in the small and large bowel, can be seen with enteritis or    diarrheal disease. 3.  Nonspecific stranding/nodularity inferior to the pancreas.     Differential considerations include inflammatory/infectious or neoplastic    process. Plan   on asa Plavix Lipitor    TTE pending   Do Vit D TSH T4   Start magnesium 400 mg daily po    Recommend 30 day event monitor when discharged   Consider lexiscan     Thank you for allowing to us to participate in the care of Vermell Hatchet. Ayad Hunter APRN-CNP-CVNP    [address]     Interventional cardiology consult note patient with confusion and right arm weakness. History of carotid occlusion in 2012 history of CEA in 2012: Cancer TIAs. CKD with creatinine 1.5. Unclear as there is a coronary or cardiac involvement yet. At this time seemingly an acute neurological episode. The TTE is pending. Magnesium is being replaced. Event recorder when discharged home and [facility].   Sierra Lombardo MD, [address] [facility]

## 2021-09-07 NOTE — PROGRESS NOTES
Pt is A&O, VSS, pt denies any pain at this time. Orally NPO, plan for stress test and EGD tomorrow. All fall precaution is in place,call light is within reach.

## 2021-09-07 NOTE — PROGRESS NOTES
Neurology Progress Note    Updates:  Syncopal episode this AM - resolved with increased BP    Exam:  -Mental status: Alert and oriented to person, place, month, year pleasant & appropriate  -Speech & Language: no aphasia; no dysarthria  -Cranial nerves: pupils symmetric; no notable dysconjugate gaze; eyes midline; no facial asymmetry  -Motor: moving all extremities symmetrically and fully  -Other: no adventitious movements noted  Other Systems  -General Appearance: well-developed, well-nourished, no apparent distress  -Neck: supple  -Lungs: breathing unlabored, regular, no audible wheezes  -CV: pulses strong x 4 extremities  -Abd: flat    Neurological ROS: no weakness, no aphasia    Labs:  Creatinine 1.5 mg/dL  A1c 6.7%  Hgb 13.9 g/dL  Hct 40%    Studies  MRI brain w/o & MR-A head and neck 9/5/21  1.  No acute intracranial infarct. Chronic left frontal lobe infarct. 2.  Chronic complete occlusion of the left internal carotid artery at its origin, also present on CTA dated 2/23/2012 and on report from outside hospital CTA dated 8/7/2020, with faint reconstitution at its terminus via the [place]. Decreased    faint opacification of the left middle cerebral artery is new compared to 2012. Recommend neurology and vascular surgery consultation.        Echo: pending    Medications   sodium chloride  1,000 mL IntraVENous Once    magnesium oxide  400 mg Oral Daily    atorvastatin  80 mg Oral Daily    aspirin  81 mg Oral Daily    insulin lispro  0-12 Units SubCUTAneous TID WC    insulin lispro  0-6 Units SubCUTAneous Nightly    clopidogrel  75 mg Oral Daily    DULoxetine  60 mg Oral Daily    pantoprazole  40 mg Oral QAM AC    sodium chloride flush  5-40 mL IntraVENous 2 times per day    enoxaparin  40 mg SubCUTAneous Daily    budesonide  0.25 mg Nebulization BID    And    Arformoterol Tartrate  15 mcg Nebulization BID    cholestyramine light  4 g Oral BID     Impression:  Freddie Weathers is a 73 y.o. male with a history of stroke (2013), chronic left carotid occlusion, and right carotid stenosis (s/p CEA 2015), and colon cancer  who presented with recurrent, transient right face and arm weakness lasting about 45 minutes - weakness reportedly worse than his initial stroke. He has had this happen several times in the past but the last time was before March 2020. He is on asa and Plavix for carotid stenosis. Per chart review, he was last seen by vascular surgery in September 2020 - and had admitted to having symptoms in the past with dehydration. Recommendations:  - Surgery consultation as outpatient  - Continue DAPT (prescribed by VS), and statin  - Liberalize BP to maintain normotension; avoid hypotension as this may lead to collateral failure  - Encourage fluid intake  - Long term cardiac monitoring to look for afib  - Awaiting echo. F/U as per primary team      No further neurologic work up, will sign off. Call with questions.     A copy of this note was provided for Dr Janeth Briscoe MD.     Sundar Tadeo NP  [facility] Neurology line: [phone]  Perfect[employer]: [facility] Neurology & Neurocritical Care NPs

## 2021-09-07 NOTE — PROGRESS NOTES
Patient having continued nausea and vomiting in large amounts. Abdomen is soft and non-distended with hyperactive bowel sounds. Some diarrhea, a lot of foul smelling belching. Vomit is yellow. Notified Dr. Richelle Byrne who ordered labs, IV fluids and stat CT of abdomen.

## 2021-09-07 NOTE — PROGRESS NOTES
Hospitalist Progress Note      PCP: Akila Mo MD    Date of Admission: 9/5/2021    Chief Complaint: Confusion, right arm weakness    Hospital Course: Admitted for right-sided weakness with concern to TIA/stroke. MRI/MRA head negative for acute stroke. 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 Patient with diarrhea. C. difficile negative. GI PCR pending. CT abdomen showed enteritis with nonspecific stranding/nodularity of the pancreas. GI/general surgery consulted    Subjective: Patient states he is still having diarrhea. Only took Imodium once or twice. Also had rapid response called earlier this morning for syncope episode. Patient was noted to have low blood pressure and was tachycardic.  1 L normal saline was given. Tachycardia and hypotension resolved. Spoke with patient's wife over the phone.   Discussed patient condition and answered all questions at length      Medications:  Reviewed    Infusion Medications    dextrose      sodium chloride       Scheduled Medications    sodium chloride  1,000 mL IntraVENous Once    magnesium oxide  400 mg Oral Daily    atorvastatin  80 mg Oral Daily    aspirin  81 mg Oral Daily    insulin lispro  0-12 Units SubCUTAneous TID WC    insulin lispro  0-6 Units SubCUTAneous Nightly    clopidogrel  75 mg Oral Daily    DULoxetine  60 mg Oral Daily    pantoprazole  40 mg Oral QAM AC    sodium chloride flush  5-40 mL IntraVENous 2 times per day    enoxaparin  40 mg SubCUTAneous Daily    budesonide  0.25 mg Nebulization BID    And    Arformoterol Tartrate  15 mcg Nebulization BID    cholestyramine light  4 g Oral BID     PRN Meds: glucose, dextrose, glucagon (rDNA), dextrose, sodium chloride flush, sodium chloride, ondansetron **OR** ondansetron, polyethylene glycol, perflutren lipid microspheres, loperamide      Intake/Output Summary (Last 24 hours) at 9/7/2021 0913  Last data filed at 9/7/2021 0250  Gross per 24 hour   Intake 500 ml   Output --   Net 500 ml       Physical Exam Performed:    /80   Pulse 87   Temp 98 °F (36.7 °C) (Oral)   Resp 17   Ht 5' 11\" (1.803 m)   Wt 220 lb (99.8 kg)   SpO2 96%   BMI 30.68 kg/m²     General appearance: No apparent distress, appears stated age and cooperative. HEENT: Pupils equal, round, and reactive to light. Conjunctivae/corneas clear. Neck: Supple, with full range of motion. No jugular venous distention. Trachea midline. Respiratory:  Normal respiratory effort. Clear to auscultation, bilaterally without Rales/Wheezes/Rhonchi. Cardiovascular: Regular rate and rhythm with normal S1/S2 without murmurs, rubs or gallops. Abdomen: Soft, non-tender, non-distended with normal bowel sounds. Musculoskeletal: No clubbing, cyanosis or edema bilaterally. Full range of motion without deformity. Skin: Skin color, texture, turgor normal.  No rashes or lesions. Neurologic:  Neurovascularly intact without any focal sensory/motor deficits.  Cranial nerves: II-XII intact, grossly non-focal.  Psychiatric: Alert and oriented, thought content appropriate, normal insight  Capillary Refill: Brisk,< 3 seconds   Peripheral Pulses: +2 palpable, equal bilaterally       Labs:   Recent Labs     09/05/21  0234 09/06/21  0618 09/07/21  0104   WBC 15.3* 10.9 9.6   HGB 14.8 13.9 14.8   HCT 45.0 40.4* 42.8    167 188     Recent Labs     09/06/21  1608 09/07/21  0104 09/07/21  0611    131* 134*   K 3.6 3.3* 3.3*    97* 100   CO2 23 21 23   BUN 23* 26* 25*   CREATININE 1.4* 1.6* 1.5*   CALCIUM 8.8 8.5 8.5     Recent Labs     09/05/21  0234   AST 22   ALT 20   BILITOT 1.1*   ALKPHOS 94     Recent Labs     09/05/21  0234   INR 1.00     Recent Labs     09/05/21  0234   TROPONINI <0.01       Urinalysis:      Lab Results   Component Value Date    NITRU Negative 10/16/2020    WBCUA None seen 10/16/2020    BACTERIA 1+ 02/22/2012    RBCUA  10/16/2020    BLOODU LARGE 10/16/2020    SPECGRAV 1.010 10/16/2020    GLUCOSEU 100 10/16/2020    GLUCOSEU NEGATIVE 02/22/2012       Radiology:  CT ABDOMEN PELVIS W IV CONTRAST Additional Contrast? None   Final Result   1. Prominent air in the gastric wall with possible portal venous air or    subtle extraluminal air. 2.  Fluid in the small and large bowel, can be seen with enteritis or    diarrheal disease. 3.  Nonspecific stranding/nodularity inferior to the pancreas. Differential considerations include inflammatory/infectious or neoplastic    process. 4.  Additional findings, as above. MRA NECK WO CONTRAST   Final Result      1. No acute intracranial infarct. Chronic left frontal lobe infarct. 2.  Chronic complete occlusion of the left internal carotid artery at its origin, also present on CTA dated 2/23/2012 and on report from outside hospital CTA dated 8/7/2020, with faint reconstitution at its terminus via the [place]. Decreased    faint opacification of the left middle cerebral artery is new compared to 2012. Recommend neurology and vascular surgery consultation. Findings were discussed with the patient's nurse Julián Santoro by Dr. Pauline Alvarado on 9/5/2021 at 1020 hours. MRI brain without contrast   Final Result      1. No acute intracranial infarct. Chronic left frontal lobe infarct. 2.  Chronic complete occlusion of the left internal carotid artery at its origin, also present on CTA dated 2/23/2012 and on report from outside hospital CTA dated 8/7/2020, with faint reconstitution at its terminus via the [place]. Decreased    faint opacification of the left middle cerebral artery is new compared to 2012. Recommend neurology and vascular surgery consultation. Findings were discussed with the patient's nurse Julián Santoro by Dr. Pauline Alvarado on 9/5/2021 at 1020 hours. MRA HEAD WO CONTRAST   Final Result      1.   No acute intracranial infarct. Chronic left frontal lobe infarct. 2.  Chronic complete occlusion of the left internal carotid artery at its origin, also present on CTA dated 2/23/2012 and on report from outside hospital CTA dated 8/7/2020, with faint reconstitution at its terminus via the [place]. Decreased    faint opacification of the left middle cerebral artery is new compared to 2012. Recommend neurology and vascular surgery consultation. Findings were discussed with the patient's nurse Jacquie Boston by Dr. Jackie Meeks on 9/5/2021 at 1020 hours. 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 Chronic left frontal lobe infarct. Chronic complete occlusion of the left internal carotid artery at its origin present since 2012 with faint reconstitution at its terminus via the [place]. Decreased faint opacification of the left middle cerebral artery. Neurosurgery consulted  Continue telemetry  Hemoglobin A1c 6.7  Lipid panel with total cholesterol 95, LDL 45, HDL 26  Echo with an EF of 40 to 45%. No PFO  Continue aspirin, Plavix   Continue statin, dose increased per neurology  Maintain normotension, avoid hypotension  Long-term cardiac monitoritoring to assess for A. Fib  Cardiology consulted, recommended 30-day event monitor on discharge  Neurology following, appreciate recommendation    #Syncope  Patient had a rapid response called earlier this morning for syncopal episode  Patient was noted to be hypotensive and tachycardic. Received 1 L of IV fluids. Continue telemetry  Echo showed an EF of 40 to 45% with hypokinesis of septal, inferior and anteroseptal walls  Discussed with cardiology, Lexiscan ordered    #Diarrhea   Hx of c diff diarrhea treated recently  C. difficile negative.  GI PCR pending  CT abdomen was suggestive of enteritis or diarrheal disease with nonspecific stranding/nodularity inferior to the pancreas suggestive of inflammatory/infectious or neoplastic process  Patient does not want to take Questran  Continue as needed Imodium  GI consulted, recommended general surgery consult  General surgery consulted     #Leukocytosis likely reactive-resolved  Chest x-ray without any consolidation. UA negative. We will continue to monitor     #Diabetes mellitus type 2  HbA1c in July 6.7%  On Metformin at home  Monitor blood glucose  Continue carb controlled diet with sliding scale insulin     #CKD stage III  Creatinine 1.5 appears to be baseline.     #Colon cancer status post colectomy   Currently on clinical trial at [facility].     #Active tobacco abuse  Still smokes half pack per day. Counseled guarding smoking cessation. Patient reported that he had tried nicotine patch and Chantix did not help.     DVT Prophylaxis: lovenox  Diet: ADULT DIET;  Regular; 4 carb choices (60 gm/meal)  Code Status: Full Code    Dispo -pending clinical improvement, [facility]/cardiology Rebekah Robbins MD

## 2021-09-07 NOTE — CONSULTS
General Surgery   Resident Consult Note    Reason for consult: diarrhea; hx of colon cancer; possible pancreatic inflammation/neoplasm    Chief Complaint: diarrhea, vomiting    History obtained from: patient/EMR    History of Present Illness :    Ronak Moscoso is a 73 y.o. male with hx of CVA, s/p R carotid endarterectomy, HTN, acute pancreatitis (2 weeks ago), colon cancer s/p laparoscopic converted to open R hemicolectomy (8/24/20), who presented to ED on 9/5 for AMS and transient weakness of RUE. Patient reports he had been having episodes of nonbloody vomiting and diarrhea starting on Saturday (9/4) followed by RUE weakness. Patient has a hx of C. Diff diarrhea treated in the last month with PO Vancomycin and tested negative for C. Diff in ED on this admission. Patient also reports being hospitalized for acute pancreatitis two weeks ago that has now resolved. Patient is currently on ASA and Plavix due to concern of hx of R carotid endarterectomy. General surgery is consulted due to diarrhea , hx of colon cancer, and CT with possible pancreatic inflammation/neoplasm. Today, patient denies any abdominal pain. Patient reports having 2 episodes of vomiting overnight followed by an episode of syncope for which he received a CT. Patient denies receiving an EGD in the past. Patient denies any fever or chills. Past Medical History:        Diagnosis Date    Cerebral artery occlusion with cerebral infarction ([facility] [address]     Depression     Hypertension        Past Surgical History:           Procedure Laterality Date    NOSE SURGERY      SALPINGO-OOPHORECTOMY      WISDOM TOOTH EXTRACTION         Allergies:  Patient has no known allergies. Medications:   Home Meds  No current facility-administered medications on file prior to encounter.      Current Outpatient Medications on File Prior to Encounter   Medication Sig Dispense Refill    metFORMIN (GLUCOPHAGE) 500 MG tablet Take 500 mg by mouth 2 times daily (with meals)      ondansetron (ZOFRAN) 4 MG tablet Take 1 tablet by mouth every 8 hours as needed for Nausea 20 tablet 0    aspirin 81 MG EC tablet Take 81 mg by mouth      atorvastatin (LIPITOR) 40 MG tablet TAKE 1 TABLET BY MOUTH DAILY.  clopidogrel (PLAVIX) 75 MG tablet TAKE 1 TAB BY MOUTH DAILY.  DULoxetine (CYMBALTA) 60 MG extended release capsule TAKE ONE CAPSULE BY MOUTH EVERY DAY      Multiple Vitamins-Minerals (MULTIVITAMIN ADULT EXTRA C PO) Take by mouth      pantoprazole (PROTONIX) 40 MG tablet TAKE 1 TAB BY MOUTH DAILY.  naproxen (NAPROSYN) 500 MG tablet Take 1 tablet by mouth 2 times daily.  20 tablet 0    tamsulosin (FLOMAX) 0.4 MG capsule Take 1 capsule by mouth daily for 5 doses 5 capsule 0    budesonide-formoterol (SYMBICORT) 80-4.5 MCG/ACT AERO Inhale 1 puff into the lungs       Current Meds  0.9 % sodium chloride bolus, Once  magnesium oxide (MAG-OX) tablet 400 mg, Daily  atorvastatin (LIPITOR) tablet 80 mg, Daily  aspirin EC tablet 81 mg, Daily  glucose (GLUTOSE) 40 % oral gel 15 g, PRN  dextrose 50 % IV solution, PRN  glucagon (rDNA) injection 1 mg, PRN  dextrose 5 % solution, PRN  insulin lispro (1 Unit Dial) 0-12 Units, TID WC  insulin lispro (1 Unit Dial) 0-6 Units, Nightly  clopidogrel (PLAVIX) tablet 75 mg, Daily  DULoxetine (CYMBALTA) extended release capsule 60 mg, Daily  pantoprazole (PROTONIX) tablet 40 mg, QAM AC  sodium chloride flush 0.9 % injection 5-40 mL, 2 times per day  sodium chloride flush 0.9 % injection 5-40 mL, PRN  0.9 % sodium chloride infusion, PRN  ondansetron (ZOFRAN-ODT) disintegrating tablet 4 mg, Q8H PRN   Or  ondansetron (ZOFRAN) injection 4 mg, Q6H PRN  polyethylene glycol (GLYCOLAX) packet 17 g, Daily PRN  enoxaparin (LOVENOX) injection 40 mg, Daily  perflutren lipid microspheres (DEFINITY) injection 1.65 mg, ONCE PRN  budesonide (PULMICORT) nebulizer suspension 250 mcg, BID   And  Arformoterol Tartrate (BROVANA) nebulizer solution 15 mcg, BID  cholestyramine light packet 4 g, BID  loperamide (IMODIUM) capsule 2 mg, 4x Daily PRN        Family History:   No family history on file. Social History:   TOBACCO:   reports that he has been smoking cigarettes. He has been smoking about 0.50 packs per day. He has never used smokeless tobacco.  ETOH:   reports current alcohol use. DRUGS:   reports no history of drug use. Review of Systems:   A 14 point review of systems was conducted, significant findings as noted in HPI. All other systems negative. Physical exam:    Vitals:    09/07/21 0030 09/07/21 0226 09/07/21 0700 09/07/21 1045   BP: 128/84 (!) 161/94 130/80 134/80   Pulse: 81 79 87 75   Resp: 16 16 17 18   Temp: 97.7 °F (36.5 °C) 97.7 °F (36.5 °C) 98 °F (36.7 °C) 97.4 °F (36.3 °C)   TempSrc: Oral Oral Oral Oral   SpO2: 96% 96% 96% 95%   Weight:       Height:           General appearance: alert, resting in bed  Neuro: A&Ox3, no focal deficits  HENT: trachea midline, no JVD, no LAD  Eyes: PERRLA, no scleral icterus  Chest/Lungs: normal respiratory effort on RA  Cardiovascular: RRR, well perfused  Abdomen: soft, non-tender, non-distended, no guarding/rigidity  Skin: warm and dry  Extremities: no edema , no cyanosis    Labs:    CBC:   Recent Labs     09/05/21  0234 09/06/21  0618 09/07/21  0104   WBC 15.3* 10.9 9.6   HGB 14.8 13.9 14.8   HCT 45.0 40.4* 42.8   MCV 89.9 89.6 88.4    167 188     BMP:   Recent Labs     09/06/21  1608 09/07/21  0104 09/07/21  0611    131* 134*   K 3.6 3.3* 3.3*    97* 100   CO2 23 21 23   BUN 23* 26* 25*   CREATININE 1.4* 1.6* 1.5*     PT/INR:   Recent Labs     09/05/21  0234   PROTIME 11.3   INR 1.00     APTT: No results for input(s): APTT in the last 72 hours.   Liver Profile:  Lab Results   Component Value Date    AST 22 09/05/2021    ALT 20 09/05/2021    BILITOT 1.1 09/05/2021    ALKPHOS 94 09/05/2021     Lab Results   Component Value Date    CHOL 95 09/06/2021    HDL 26 09/06/2021    HDL 27 02/23/2012    TRIG 120 09/06/2021     UA:   Lab Results   Component Value Date    NITRITE neg 02/22/2012    COLORU Yellow 10/16/2020    PHUR 5.5 10/16/2020    LABCAST 0-1 Coarsely granular 02/22/2012    WBCUA None seen 10/16/2020    RBCUA  10/16/2020    MUCUS 2+ 02/22/2012    BACTERIA 1+ 02/22/2012    CLARITYU Clear 10/16/2020    SPECGRAV 1.010 10/16/2020    LEUKOCYTESUR Negative 10/16/2020    UROBILINOGEN 0.2 10/16/2020    BILIRUBINUR Negative 10/16/2020    BILIRUBINUR neg 02/22/2012    BILIRUBINUR NEGATIVE 02/22/2012    BLOODU LARGE 10/16/2020    GLUCOSEU 100 10/16/2020    GLUCOSEU NEGATIVE 02/22/2012       Imaging:   CT ABDOMEN PELVIS W IV CONTRAST Additional Contrast? None   Final Result   1. Prominent air in the gastric wall with possible portal venous air or    subtle extraluminal air. 2.  Fluid in the small and large bowel, can be seen with enteritis or    diarrheal disease. 3.  Nonspecific stranding/nodularity inferior to the pancreas. Differential considerations include inflammatory/infectious or neoplastic    process. 4.  Additional findings, as above. MRA NECK WO CONTRAST   Final Result      1. No acute intracranial infarct. Chronic left frontal lobe infarct. 2.  Chronic complete occlusion of the left internal carotid artery at its origin, also present on CTA dated 2/23/2012 and on report from outside hospital CTA dated 8/7/2020, with faint reconstitution at its terminus via the [place]. Decreased    faint opacification of the left middle cerebral artery is new compared to 2012. Recommend neurology and vascular surgery consultation. Findings were discussed with the patient's nurse Cindy Rothman by Dr. Jim Carias on 9/5/2021 at 1020 hours. MRI brain without contrast   Final Result      1. No acute intracranial infarct. Chronic left frontal lobe infarct.    2.  Chronic complete occlusion of the left internal carotid artery at its origin, also present on CTA dated 2/23/2012 and on report from outside hospital CTA dated 8/7/2020, with faint reconstitution at its terminus via the [place]. Decreased    faint opacification of the left middle cerebral artery is new compared to 2012. Recommend neurology and vascular surgery consultation. Findings were discussed with the patient's nurse Dulce Maria Flynn by Dr. Luisito Jc on 9/5/2021 at 1020 hours. MRA HEAD WO CONTRAST   Final Result      1. No acute intracranial infarct. Chronic left frontal lobe infarct. 2.  Chronic complete occlusion of the left internal carotid artery at its origin, also present on CTA dated 2/23/2012 and on report from outside hospital CTA dated 8/7/2020, with faint reconstitution at its terminus via the [place]. Decreased    faint opacification of the left middle cerebral artery is new compared to 2012. Recommend neurology and vascular surgery consultation. Findings were discussed with the patient's nurse Dulce Maria Flynn by Dr. Luisito Jc on 9/5/2021 at 1020 hours. Assessment/Plan: This is a 73 y.o. male with Hx of CVA, s/p R carotid endarterectomy, HTN, acute pancreatitis (2 weeks ago), colon cancer s/p laparoscopic converted to open R hemicolectomy (8/24/20), who presented to ED on 9/5 for AMS and transient weakness of RUE. General surgery is consulted for diarrhea and suspicious findings on CT AP which include gastric pneumatosis with possible portal vein air and inflammatory vs neoplastic changes in pancreas. Patient is HDS on RA and afebrile. Labs show WBC, HgB/HCT, and lipase within normal levels. On physical exam, patient is nontender to palpation, w/o signs of acute abdomen. Pancreatic changes seen on CT likely due to episode of acute pancreatitis 2 weeks ago. Benign gastric pneumatosis is suspected due to patient history of vomiting, smoking history, and benign abdominal exam. Will continue to monitor closely.     - consult GI; plan for EGD tomorrow to evaluate gastric mucosa  - high dose IV PPI BID  - NPO now  - serial abdominal exams to ensure no acute worsening; may require surgical intervention if acute worsening  - surgery will continue to follow  - patient seen with senior resident and discussed with attending, Dr. Alida Prieto DO  PGY-1, General Surgery  09/07/21  12:11 PM  [phone]

## 2021-09-07 NOTE — PROGRESS NOTES
Consulted to see patient but record review shows he is a patient of Dr Carrol Jack who did colonoscopy at [facility] several months ago, would consult [facility] for continuity, Stephanie Gustafson will sign off.

## 2021-09-07 NOTE — PROGRESS NOTES
Patient was taken to CT in wheelchair where he fainted in the chair before getting onto CT table. Rapid called and ED team came to CT room. Patient lifted to stretcher when found to have a thready pulse. Immediately came back to when lying on bed. Vitals were stable and EKG done. Fluids started. Patient was able to get CT scan and return to floor. Dr. Denise Knott notified of situation, no new orders at this time and patient resting comfortably in bed.

## 2021-09-07 NOTE — PROGRESS NOTES
D/w neurosurgery - patient has scheduled f/u with his vascular surgeon this month  Will d/c consultation as requested  Amira Quinn NP  Neurology

## 2021-09-07 NOTE — CARE COORDINATION
SW rounded on this date. Patient is from home with spouse and daughter. Independent at baseline. Therapy cleared patient on 9/6. SW can be consulted should specific needs arise.      FLORENCIO Dewitt, [place]  Social Work/Case Management  [facility]   [phone]

## 2021-09-07 NOTE — PROGRESS NOTES
Patient having a large amount of vomiting and continued nausea. Contacted Dr. Blaise Shafer, order for IM phenergan placed.

## 2021-09-07 NOTE — CONSULTS
[address] [place] [address]  GI Consultation      Patient: Yvette Hunt  : 1950       Date:  2021    Subjective:       History of Present Illness  Patient is a 73 y.o.male admitted with TIA (transient ischemic attack) [G45.9]  Right arm weakness [R29.898] who is seen in consult for diarrhea. Patient is 68-year-old male with history of colon cancer s/p right colon resection (2020), multiple TIAs, DM2, C diff (2021), pancreatitis (2021), and GERD presented to [facility] ED  with sudden onset of nausea, vomiting, and diarrhea starting on . He had right sided weakness and confusion. Was transferred to [facility] for neurology evaluation. Recent admission in July for gallstone pancreatitis and C diff. Reports that he normally has 2 soft BM per day. Was started on cholestyramine  but refuses to take it due to the taste and feeling nauseous. Received imodium x2 with no improvement in diarrhea. 4 episodes of emesis and 8 episodes of diarrhea last night. Reports that he feels better today but still has some nausea and few episodes of diarrhea this AM. C diff negative. GI PCR pending. Past Medical History:   Diagnosis Date    Cerebral artery occlusion with cerebral infarction ([facility] [address]     Depression     Hypertension       Past Surgical History:   Procedure Laterality Date    NOSE SURGERY      SALPINGO-OOPHORECTOMY      WISDOM TOOTH EXTRACTION     ·  Laparoscopic right colon resection (Right, 2020)    Past Endoscopic History   Colonoscopy 2/10/2021- biopsy of colon anastomosis was negative for dysplasia or malignancy. Biopsy of right colon nodule consistent with lipoma. Polyp at traverse colon was sessile serrated adenoma. Admission Meds  No current facility-administered medications on file prior to encounter.      Current Outpatient Medications on File Prior to Encounter   Medication Sig Dispense Refill    metFORMIN (GLUCOPHAGE) 500 MG tablet Take 500 mg by mouth 2 times daily (with meals)      ondansetron (ZOFRAN) 4 MG tablet Take 1 tablet by mouth every 8 hours as needed for Nausea 20 tablet 0    aspirin 81 MG EC tablet Take 81 mg by mouth      atorvastatin (LIPITOR) 40 MG tablet TAKE 1 TABLET BY MOUTH DAILY.  clopidogrel (PLAVIX) 75 MG tablet TAKE 1 TAB BY MOUTH DAILY.  DULoxetine (CYMBALTA) 60 MG extended release capsule TAKE ONE CAPSULE BY MOUTH EVERY DAY      Multiple Vitamins-Minerals (MULTIVITAMIN ADULT EXTRA C PO) Take by mouth      pantoprazole (PROTONIX) 40 MG tablet TAKE 1 TAB BY MOUTH DAILY.  naproxen (NAPROSYN) 500 MG tablet Take 1 tablet by mouth 2 times daily. 20 tablet 0    tamsulosin (FLOMAX) 0.4 MG capsule Take 1 capsule by mouth daily for 5 doses 5 capsule 0    budesonide-formoterol (SYMBICORT) 80-4.5 MCG/ACT AERO Inhale 1 puff into the lungs         Patient takes ASA 81 mg daily. Allergies  No Known Allergies   Social   Social History     Tobacco Use    Smoking status: Current Every Day Smoker     Packs/day: 0.50     Types: Cigarettes    Smokeless tobacco: Never Used   Substance Use Topics    Alcohol use: Yes     Comment: occ       Family Hx:  No Family history of UC, Crohn's Disease, or colon cancer. Review of Systems  Pertinent items are noted in HPI.        Physical Exam    /80   Pulse 75   Temp 97.4 °F (36.3 °C) (Oral)   Resp 18   Ht 5' 11\" (1.803 m)   Wt 220 lb (99.8 kg)   SpO2 95%   BMI 30.68 kg/m²   General appearance: alert, cooperative, no distress, appears stated age  Anicteric, No Jaundice  Abdomen: soft, non-tender; bowel sounds normal; no masses,  no organomegaly  AAOX3      Data Review:    Recent Labs     09/05/21  0234 09/06/21  0618 09/07/21  0104   WBC 15.3* 10.9 9.6   HGB 14.8 13.9 14.8   HCT 45.0 40.4* 42.8   MCV 89.9 89.6 88.4    167 188     Recent Labs     09/06/21  1608 09/07/21  0104 09/07/21  0611    131* 134*   K 3.6 3.3* 3.3*    97* 100   CO2 23 21 23   BUN 23* 26* 25* CREATININE 1.4* 1.6* 1.5*     Recent Labs     09/05/21  0234   AST 22   ALT 20   BILITOT 1.1*   ALKPHOS 94     Recent Labs     09/07/21  0104   LIPASE 52.0     Recent Labs     09/05/21  0234   PROTIME 11.3   INR 1.00     No results for input(s): PTT in the last 72 hours. No results for input(s): OCCULTBLD in the last 72 hours. Imaging Studies:  CT-scan of abdomen and pelvis:  Gallbladder and bile ducts:  Cholelithiasis. Pancreas:  Nonspecific stranding/nodularity inferior to the pancreas. Spleen:  Borderline splenomegaly. Stomach and bowel:  Prominent air in the gastric wall at the fundus   with possible portal venous air or subtle extraluminal air.  Fluid in the   small and large bowel, can be seen with enteritis or diarrheal disease.     Areas of mild bowel wall thickening or underdistention.  Underlying mass   not excluded.  Prior bowel surgery noted.  Scattered colonic diverticula. Impression:  1.  Prominent air in the gastric wall with possible portal venous air or   subtle extraluminal air. 2.  Fluid in the small and large bowel, can be seen with enteritis or   diarrheal disease. 3.  Nonspecific stranding/nodularity inferior to the pancreas.     Differential considerations include inflammatory/infectious or neoplastic   process. Assessment:   72-year-old male with history of colon cancer s/p right colon resection (8/2020), multiple TIAs, DM2, C diff (7/2021), pancreatitis (7/2021), and GERD with R sided weakness and diarrhea. Gastroenteritis  - multiple episodes of loose non bloody stool  - CT with fluid in small and large bowl  - afebrile, no leukocytosis, no abdominal pain  - GI PCR pending  - Recently treated for C diff. C diff negative    Hx of pancreatitis  - likely related to nonspecific pancreatic stranding on CT    Colon cancer s/p colectomy  Transient Right-sided arm weakness   DM2  CKDIII    Recommendations:      Will f/u GI PCR  Imodium prn    Thank you for the

## 2021-09-07 NOTE — SIGNIFICANT EVENT
SIGNIFICANT EVENT:    Rapid Response called at [address], for bed 5519. Patient was in ED/CT scan area     At time of presentation patient was awake w/ compliant of syncope episode and patient vitals at time of examination were , /86, SpO2 90,     Interventions preformed/Labs drawn included: 1 L NS bolus, BMP, CBC.      Patient improved with above interventions     Patient vitals now: HR 79, /89, SpO2 93    Patient deemed to be Medically stable, will follow up labs and monitor Jude Foss MD, PGY-1  09/07/21  12:25 AM

## 2021-09-07 NOTE — PROGRESS NOTES
Physician Progress Note      PATIENT:               Dany Padron  CSN #:                  [id number]  :                       1950  ADMIT DATE:       2021 8:55 AM  DISCH DATE:  RESPONDING  PROVIDER #:        Adrienne Edouard MD          QUERY TEXT:    Pt admitted with TIA. 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 If possible,   please document in progress notes and discharge summary if you are evaluating   and /or treating any of the following      The medical record reflects the following:  Risk Factors: 72 y/o male with TIA  Clinical Indicators: H&P: \"#Transient Right-sided weakness with history of   multiple TIAs. \"    PN: \"Continue statin, dose increased per neurology   Maintain normotension, avoid hypotension  Long-term cardiac monitoritoring to   assess for A. Fib, cardiology consulted  Treatment: Neurosurgery consult, Neuro, MRI/MRA , Maintain normotension, avoid   hypotension, long term card monitor to look for afib  Options provided:  -- TIA symptoms Likely due to  Chronic left frontal lobe infarct and Chronic   complete occlusion of the left internal carotid artery  -- TIA symptoms Likely due to  Chronic left frontal lobe infarct  -- TIA symptoms Likely due to Chronic complete occlusion of the left internal   carotid artery  -- Embolic TIA likely due to Afib  -- Other - I will add my own diagnosis  -- Disagree - Not applicable / Not valid  -- Disagree - Clinically unable to determine / Unknown  -- Refer to Clinical Documentation Reviewer    PROVIDER RESPONSE TEXT:    TIA symptoms are likely due to Chronic complete occlusion of the left internal   carotid artery.     Query created by: Anna Rogel on 2021 12:50 PM      Electronically signed by:  Adrienne Edouard MD 2021 12:55 PM

## 2021-09-07 NOTE — PLAN OF CARE
Problem: Falls - Risk of:  Goal: Will remain free from falls  Description: Will remain free from falls  9/7/2021 0208 by Shadi Khan RN  Outcome: Ongoing   Patient has remained free of falls. 2/4 bed rails up, bed locked and in lowest position, call light within reach. Patient instructed on use of call light and uses appropriately. Bed alarm on. Non-skid footwear and fall band on. Problem: HEMODYNAMIC STATUS  Goal: Patient has stable vital signs and fluid balance  Outcome: Ongoing   NIH a 0. Patient's VSS.

## 2021-09-08 LAB
ANION GAP SERPL CALCULATED.3IONS-SCNC: 10 MMOL/L (ref 3–16)
BASOPHILS ABSOLUTE: 0 K/UL (ref 0–0.2)
BASOPHILS RELATIVE PERCENT: 0.3 %
BUN BLDV-MCNC: 21 MG/DL (ref 7–20)
CALCIUM SERPL-MCNC: 8.3 MG/DL (ref 8.3–10.6)
CHLORIDE BLD-SCNC: 103 MMOL/L (ref 99–110)
CO2: 24 MMOL/L (ref 21–32)
CREAT SERPL-MCNC: 1.3 MG/DL (ref 0.8–1.3)
EOSINOPHILS ABSOLUTE: 0.2 K/UL (ref 0–0.6)
EOSINOPHILS RELATIVE PERCENT: 2.6 %
GFR AFRICAN AMERICAN: >60
GFR NON-AFRICAN AMERICAN: 55
GI BACTERIAL PATHOGENS BY PCR: NORMAL
GLUCOSE BLD-MCNC: 104 MG/DL (ref 70–99)
GLUCOSE BLD-MCNC: 105 MG/DL (ref 70–99)
GLUCOSE BLD-MCNC: 111 MG/DL (ref 70–99)
GLUCOSE BLD-MCNC: 138 MG/DL (ref 70–99)
GLUCOSE BLD-MCNC: 98 MG/DL (ref 70–99)
HCT VFR BLD CALC: 38.7 % (ref 40.5–52.5)
HEMOGLOBIN: 13.5 G/DL (ref 13.5–17.5)
LYMPHOCYTES ABSOLUTE: 1.7 K/UL (ref 1–5.1)
LYMPHOCYTES RELATIVE PERCENT: 24.2 %
MAGNESIUM: 1.6 MG/DL (ref 1.8–2.4)
MCH RBC QN AUTO: 30.9 PG (ref 26–34)
MCHC RBC AUTO-ENTMCNC: 35 G/DL (ref 31–36)
MCV RBC AUTO: 88.3 FL (ref 80–100)
MONOCYTES ABSOLUTE: 0.9 K/UL (ref 0–1.3)
MONOCYTES RELATIVE PERCENT: 13.2 %
NEUTROPHILS ABSOLUTE: 4.1 K/UL (ref 1.7–7.7)
NEUTROPHILS RELATIVE PERCENT: 59.7 %
PDW BLD-RTO: 14 % (ref 12.4–15.4)
PERFORMED ON: ABNORMAL
PERFORMED ON: NORMAL
PHOSPHORUS: 2.6 MG/DL (ref 2.5–4.9)
PLATELET # BLD: 171 K/UL (ref 135–450)
PMV BLD AUTO: 11.7 FL (ref 5–10.5)
POTASSIUM REFLEX MAGNESIUM: 3.5 MMOL/L (ref 3.5–5.1)
RBC # BLD: 4.38 M/UL (ref 4.2–5.9)
SODIUM BLD-SCNC: 137 MMOL/L (ref 136–145)
VITAMIN D 25-HYDROXY: 48.6 NG/ML
WBC # BLD: 6.8 K/UL (ref 4–11)

## 2021-09-08 PROCEDURE — 6370000000 HC RX 637 (ALT 250 FOR IP): Performed by: INTERNAL MEDICINE

## 2021-09-08 PROCEDURE — 2580000003 HC RX 258: Performed by: INTERNAL MEDICINE

## 2021-09-08 PROCEDURE — 2580000003 HC RX 258: Performed by: SURGERY

## 2021-09-08 PROCEDURE — 6370000000 HC RX 637 (ALT 250 FOR IP): Performed by: NURSE PRACTITIONER

## 2021-09-08 PROCEDURE — 99233 SBSQ HOSP IP/OBS HIGH 50: CPT | Performed by: NURSE PRACTITIONER

## 2021-09-08 PROCEDURE — C9113 INJ PANTOPRAZOLE SODIUM, VIA: HCPCS | Performed by: INTERNAL MEDICINE

## 2021-09-08 PROCEDURE — 36415 COLL VENOUS BLD VENIPUNCTURE: CPT

## 2021-09-08 PROCEDURE — 80048 BASIC METABOLIC PNL TOTAL CA: CPT

## 2021-09-08 PROCEDURE — 6360000002 HC RX W HCPCS: Performed by: INTERNAL MEDICINE

## 2021-09-08 PROCEDURE — 99232 SBSQ HOSP IP/OBS MODERATE 35: CPT | Performed by: SURGERY

## 2021-09-08 PROCEDURE — 2060000000 HC ICU INTERMEDIATE R&B

## 2021-09-08 PROCEDURE — 99233 SBSQ HOSP IP/OBS HIGH 50: CPT | Performed by: INTERNAL MEDICINE

## 2021-09-08 PROCEDURE — 85025 COMPLETE CBC W/AUTO DIFF WBC: CPT

## 2021-09-08 PROCEDURE — 83735 ASSAY OF MAGNESIUM: CPT

## 2021-09-08 PROCEDURE — 2580000003 HC RX 258

## 2021-09-08 PROCEDURE — 84100 ASSAY OF PHOSPHORUS: CPT

## 2021-09-08 PROCEDURE — 6360000002 HC RX W HCPCS

## 2021-09-08 PROCEDURE — 2500000003 HC RX 250 WO HCPCS

## 2021-09-08 RX ORDER — POTASSIUM CHLORIDE 7.45 MG/ML
10 INJECTION INTRAVENOUS
Status: COMPLETED | OUTPATIENT
Start: 2021-09-08 | End: 2021-09-08

## 2021-09-08 RX ADMIN — CLOPIDOGREL BISULFATE 75 MG: 75 TABLET ORAL at 13:10

## 2021-09-08 RX ADMIN — LOPERAMIDE HYDROCHLORIDE 2 MG: 2 CAPSULE ORAL at 20:53

## 2021-09-08 RX ADMIN — ENOXAPARIN SODIUM 40 MG: 40 INJECTION SUBCUTANEOUS at 13:09

## 2021-09-08 RX ADMIN — SODIUM CHLORIDE: 9 INJECTION, SOLUTION INTRAVENOUS at 17:00

## 2021-09-08 RX ADMIN — Medication 400 MG: at 13:09

## 2021-09-08 RX ADMIN — POTASSIUM PHOSPHATE, MONOBASIC POTASSIUM PHOSPHATE, DIBASIC 10 MMOL: 224; 236 INJECTION, SOLUTION, CONCENTRATE INTRAVENOUS at 17:27

## 2021-09-08 RX ADMIN — POTASSIUM CHLORIDE 10 MEQ: 7.45 INJECTION INTRAVENOUS at 14:40

## 2021-09-08 RX ADMIN — POTASSIUM CHLORIDE 10 MEQ: 7.45 INJECTION INTRAVENOUS at 15:25

## 2021-09-08 RX ADMIN — SODIUM CHLORIDE: 9 INJECTION, SOLUTION INTRAVENOUS at 03:39

## 2021-09-08 RX ADMIN — SODIUM CHLORIDE 25 ML: 9 INJECTION, SOLUTION INTRAVENOUS at 17:25

## 2021-09-08 RX ADMIN — POTASSIUM CHLORIDE 10 MEQ: 7.45 INJECTION INTRAVENOUS at 13:22

## 2021-09-08 RX ADMIN — CHOLESTYRAMINE 4 G: 4 POWDER, FOR SUSPENSION ORAL at 13:10

## 2021-09-08 RX ADMIN — POTASSIUM CHLORIDE 10 MEQ: 7.45 INJECTION INTRAVENOUS at 13:56

## 2021-09-08 RX ADMIN — CHOLESTYRAMINE 4 G: 4 POWDER, FOR SUSPENSION ORAL at 20:53

## 2021-09-08 RX ADMIN — ASPIRIN 81 MG: 81 TABLET, COATED ORAL at 13:09

## 2021-09-08 RX ADMIN — PANTOPRAZOLE SODIUM 40 MG: 40 INJECTION, POWDER, FOR SOLUTION INTRAVENOUS at 13:10

## 2021-09-08 RX ADMIN — DULOXETINE HYDROCHLORIDE 60 MG: 60 CAPSULE, DELAYED RELEASE ORAL at 13:09

## 2021-09-08 RX ADMIN — ATORVASTATIN CALCIUM 80 MG: 80 TABLET, FILM COATED ORAL at 13:09

## 2021-09-08 RX ADMIN — SODIUM CHLORIDE 25 ML: 9 INJECTION, SOLUTION INTRAVENOUS at 13:20

## 2021-09-08 RX ADMIN — PANTOPRAZOLE SODIUM 40 MG: 40 INJECTION, POWDER, FOR SOLUTION INTRAVENOUS at 20:53

## 2021-09-08 NOTE — PROGRESS NOTES
Hospitalist Progress Note      PCP: Berneice Brittle, MD    Date of Admission: 9/5/2021    Chief Complaint: Confusion, right arm weakness    Hospital Course: Admitted for right-sided weakness with concern to TIA/stroke. MRI/MRA head negative for acute stroke. 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 Patient with diarrhea. C. difficile negative. GI PCR negative. CT abdomen showed gastric pneumatosis with possible portal vein air, enteritis with nonspecific stranding/nodularity of the pancreas. General surgery consulted, recommending EGD. GI consulted, plan for EGD    Subjective: Patient states he is hungry. Still having diarrhea but it is getting better. Denies fever chills, nausea or vomiting.     Medications:  Reviewed    Infusion Medications    sodium chloride 100 mL/hr at 09/08/21 0339    dextrose      sodium chloride       Scheduled Medications    sodium chloride  1,000 mL IntraVENous Once    magnesium oxide  400 mg Oral Daily    pantoprazole  40 mg IntraVENous BID    atorvastatin  80 mg Oral Daily    aspirin  81 mg Oral Daily    insulin lispro  0-12 Units SubCUTAneous TID WC    insulin lispro  0-6 Units SubCUTAneous Nightly    clopidogrel  75 mg Oral Daily    DULoxetine  60 mg Oral Daily    sodium chloride flush  5-40 mL IntraVENous 2 times per day    enoxaparin  40 mg SubCUTAneous Daily    budesonide  0.25 mg Nebulization BID    And    Arformoterol Tartrate  15 mcg Nebulization BID    cholestyramine light  4 g Oral BID     PRN Meds: glucose, dextrose, glucagon (rDNA), dextrose, sodium chloride flush, sodium chloride, ondansetron **OR** ondansetron, polyethylene glycol, perflutren lipid microspheres, loperamide      Intake/Output Summary (Last 24 hours) at 9/8/2021 0757  Last data filed at 9/8/2021 0339  Gross per 24 hour   Intake 240 ml   Output --   Net 240 ml Physical Exam Performed:    /75   Pulse 82   Temp 97.8 °F (36.6 °C)   Resp 16   Ht 5' 11\" (1.803 m)   Wt 220 lb (99.8 kg)   SpO2 95%   BMI 30.68 kg/m²     General appearance: No apparent distress, appears stated age and cooperative. HEENT: Pupils equal, round, and reactive to light. Conjunctivae/corneas clear. Neck: Supple, with full range of motion. No jugular venous distention. Trachea midline. Respiratory:  Normal respiratory effort. Clear to auscultation, bilaterally without Rales/Wheezes/Rhonchi. Cardiovascular: Regular rate and rhythm with normal S1/S2 without murmurs, rubs or gallops. Abdomen: Soft, non-tender, non-distended with normal bowel sounds. Musculoskeletal: No clubbing, cyanosis or edema bilaterally. Full range of motion without deformity. Skin: Skin color, texture, turgor normal.  No rashes or lesions. Neurologic:  Neurovascularly intact without any focal sensory/motor deficits. Cranial nerves: II-XII intact, grossly non-focal.  Psychiatric: Alert and oriented, thought content appropriate, normal insight  Capillary Refill: Brisk,< 3 seconds   Peripheral Pulses: +2 palpable, equal bilaterally       Labs:   Recent Labs     09/06/21  0618 09/07/21  0104 09/08/21  0601   WBC 10.9 9.6 6.8   HGB 13.9 14.8 13.5   HCT 40.4* 42.8 38.7*    188 171     Recent Labs     09/07/21  0104 09/07/21  0611 09/08/21  0601   * 134* 137   K 3.3* 3.3* 3.5   CL 97* 100 103   CO2 21 23 24   BUN 26* 25* 21*   CREATININE 1.6* 1.5* 1.3   CALCIUM 8.5 8.5 8.3     No results for input(s): AST, ALT, BILIDIR, BILITOT, ALKPHOS in the last 72 hours. No results for input(s): INR in the last 72 hours. No results for input(s): Connee Matar in the last 72 hours.     Urinalysis:      Lab Results   Component Value Date    NITRU Negative 10/16/2020    WBCUA None seen 10/16/2020    BACTERIA 1+ 02/22/2012    RBCUA  10/16/2020    BLOODU LARGE 10/16/2020    SPECGRAV 1.010 10/16/2020 GLUCOSEU 100 10/16/2020    GLUCOSEU NEGATIVE 02/22/2012       Radiology:  CT ABDOMEN PELVIS W IV CONTRAST Additional Contrast? None   Final Result   1. Prominent air in the gastric wall with possible portal venous air or    subtle extraluminal air. 2.  Fluid in the small and large bowel, can be seen with enteritis or    diarrheal disease. 3.  Nonspecific stranding/nodularity inferior to the pancreas. Differential considerations include inflammatory/infectious or neoplastic    process. 4.  Additional findings, as above. MRA NECK WO CONTRAST   Final Result      1. No acute intracranial infarct. Chronic left frontal lobe infarct. 2.  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 Decreased    faint opacification of the left middle cerebral artery is new compared to 2012. Recommend neurology and vascular surgery consultation. Findings were discussed with the patient's nurse Rajiv Madison by Dr. Solitario Palacio on 9/5/2021 at 1020 hours. MRI brain without contrast   Final Result      1. No acute intracranial infarct. Chronic left frontal lobe infarct. 2.  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 Decreased    faint opacification of the left middle cerebral artery is new compared to 2012. Recommend neurology and vascular surgery consultation. Findings were discussed with the patient's nurse Rajiv Madison by Dr. Solitario Palacio on 9/5/2021 at 1020 hours. MRA HEAD WO CONTRAST   Final Result      1. No acute intracranial infarct. Chronic left frontal lobe infarct.    2.  Chronic complete occlusion of the left internal carotid vein air, enteritis or diarrheal disease with nonspecific stranding/nodularity inferior to the pancreas suggestive of inflammatory/infectious or neoplastic process  Patient does not want to take Questran  Continue as needed Imodium  General surgery following, recommending EGD by GI  GI consulted, plan was for EGD today. Got canceled. Plan for tomorrow     #Leukocytosis likely reactive-resolved  Chest x-ray without any consolidation. UA negative. We will continue to monitor     #Diabetes mellitus type 2  HbA1c in July 6.7%  On Metformin at home  Monitor blood glucose  Continue carb controlled diet with sliding scale insulin     #CKD stage III  Creatinine 1.5 appears to be baseline  Monitor creatinine, stable     #Colon cancer status post colectomy   Currently on clinical trial at [facility].     #Active tobacco abuse  Still smokes half pack per day. Counseled guarding smoking cessation.     Patient reported that he had tried nicotine patch and Chantix did not help.     DVT Prophylaxis: lovenox  Diet: Diet NPO  Code Status: Full Code    Dispo -pending clinical improvement, EGD, John Fofana, GI/cardiology/surgery Romero Penn MD

## 2021-09-08 NOTE — PROGRESS NOTES
[address]   Cardiology  Note   Dr Felisa Michele MD, Aisha Ronquillo FNP APRN CVNP  Date: 9/8/2021  Admit Date: 9/5/2021       Interval Hx: Today, he is resting quietly VSS SV02 95% RA   Magnesium 1.6 / replace today   To have EGD   Plan stress test when other issues are resolved   No major events overnight. Denies having chest pain, palpitations, shortness of breath, orthopnea/PND, cough, or dizziness at the time of this visit. CC: Confusion, right arm weakness   HPI: Peggy Donahue is a 73 y.o. male with a past medical history of carotid occlusion left noted in 2012 and he had CEA in 2012 right,tobacco use, CRD  DMT2, colon ca multiple TIA's presented wth n/v diarrhea, right sided weakness with confusion,  no c/o cp/SOB or edema    EKG NSR nonspecific ST changes    sCr 1.6>1.5  Stable   Magnesium 1.5>1.6  remains on low side   Trop neg x 1     Patient seen and examined. Clinical notes reviewed. Telemetry reviewed / testing reviewed 30 minutes   Pertinent labs, diagnostic, device, and imaging results reviewed as a part of this visit  EKG TTE stress test: any LHC/RHC reviewed     Past Medical History:  Past Medical History:   Diagnosis Date    Cerebral artery occlusion with cerebral infarction ([facility] [address]     Depression     Hypertension         Past Surgical History:    has a past surgical history that includes Nose surgery; Salpingo-oophorectomy; and [place] tooth extraction. Social History:  Reviewed. reports that he has been smoking cigarettes. He has been smoking about 0.50 packs per day. He has never used smokeless tobacco. He reports current alcohol use. He reports that he does not use drugs. Allergies:  No Known Allergies    Family History:  Reviewed. family history is not on file. Denies family history of sudden cardiac death, arrhythmia, premature CAD    Home Meds:  Prior to Visit Medications    Medication Sig Taking?  Authorizing Provider   metFORMIN (GLUCOPHAGE) 500 MG tablet Take 500 mg by mouth 2 times daily (with meals) Yes Historical Provider, MD   ondansetron (ZOFRAN) 4 MG tablet Take 1 tablet by mouth every 8 hours as needed for Nausea Yes Evan Lopez PA-C   aspirin 81 MG EC tablet Take 81 mg by mouth Yes Historical Provider, MD   atorvastatin (LIPITOR) 40 MG tablet TAKE 1 TABLET BY MOUTH DAILY. Yes Historical Provider, MD   clopidogrel (PLAVIX) 75 MG tablet TAKE 1 TAB BY MOUTH DAILY. Yes Historical Provider, MD   DULoxetine (CYMBALTA) 60 MG extended release capsule TAKE ONE CAPSULE BY MOUTH EVERY DAY Yes Historical Provider, MD   Multiple Vitamins-Minerals (MULTIVITAMIN ADULT EXTRA C PO) Take by mouth Yes Historical Provider, MD   pantoprazole (PROTONIX) 40 MG tablet TAKE 1 TAB BY MOUTH DAILY. Yes Historical Provider, MD   naproxen (NAPROSYN) 500 MG tablet Take 1 tablet by mouth 2 times daily.  Yes Ria Barrientos DO   tamsulosin (FLOMAX) 0.4 MG capsule Take 1 capsule by mouth daily for 5 doses  INÉS Beckham - JULIANA   budesonide-formoterol (SYMBICORT) 80-4.5 MCG/ACT AERO Inhale 1 puff into the lungs  Historical Provider, MD        Scheduled Meds:   potassium chloride  10 mEq IntraVENous Q1H    potassium phosphate IVPB  10 mmol IntraVENous Once    sodium chloride  1,000 mL IntraVENous Once    magnesium oxide  400 mg Oral Daily    pantoprazole  40 mg IntraVENous BID    atorvastatin  80 mg Oral Daily    aspirin  81 mg Oral Daily    insulin lispro  0-12 Units SubCUTAneous TID WC    insulin lispro  0-6 Units SubCUTAneous Nightly    clopidogrel  75 mg Oral Daily    DULoxetine  60 mg Oral Daily    sodium chloride flush  5-40 mL IntraVENous 2 times per day    enoxaparin  40 mg SubCUTAneous Daily    budesonide  0.25 mg Nebulization BID    And    Arformoterol Tartrate  15 mcg Nebulization BID    cholestyramine light  4 g Oral BID     Continuous Infusions:   sodium chloride 100 mL/hr at 09/08/21 1018    dextrose      sodium chloride 25 mL (09/08/21 1320) Physical Examination:  Vitals:    09/08/21 1054   BP: 120/77   Pulse: 69   Resp: 16   Temp: 98.2 °F (36.8 °C)   SpO2: 95%      In: 1882.7 [P.O.:240; I.V.:1642.7]  Out: -    Wt Readings from Last 3 Encounters:   09/05/21 220 lb (99.8 kg)   09/05/21 220 lb (99.8 kg)   01/21/20 220 lb (99.8 kg)       Intake/Output Summary (Last 24 hours) at 9/8/2021 1437  Last data filed at 9/8/2021 1018  Gross per 24 hour   Intake 1882.67 ml   Output --   Net 1882.67 ml       Telemetry: Personally Reviewed    · Constitutional: Cooperative and in no apparent distress, and appears well nourished  · Skin: Warm and pink; no pallor, cyanosis, clubbing, or bruising   · HEENT: Symmetric and normocephalic. · Cardiovascular: Regular rate and rhythm. S1/S2   · Respiratory: Respirations symmetric and unlabored. Lungs clear to auscultation bilaterally, no wheezing, crackles, or rhonchi  · Gastrointestinal: Abdomen soft and round. Bowel sounds normoactive without tenderness or masses. · Musculoskeletal: Bilateral upper and lower extremity strength 5/5 with full ROM  · Neurologic/Psych: Awake and orientated to person, place and time. Calm affect, appropriate mood    Recent Labs     09/07/21  0104 09/07/21  0611 09/08/21  0601   * 134* 137   K 3.3* 3.3* 3.5   CL 97* 100 103   CO2 21 23 24   PHOS  --   --  2.6   BUN 26* 25* 21*   CREATININE 1.6* 1.5* 1.3   MG 1.50* 1.60* 1.60*     Estimated Creatinine Clearance: 65 mL/min (based on SCr of 1.3 mg/dL).    CBC:   Recent Labs     09/06/21  0618 09/07/21  0104 09/08/21  0601   WBC 10.9 9.6 6.8   HGB 13.9 14.8 13.5   HCT 40.4* 42.8 38.7*   MCV 89.6 88.4 88.3    188 171     :   Lab Results   Component Value Date    CHOL 95 09/06/2021    HDL 26 09/06/2021    HDL 27 02/23/2012    TRIG 120 09/06/2021     LFTS:   Lab Results   Component Value Date    ALT 20 09/05/2021    AST 22 09/05/2021    ALKPHOS 94 09/05/2021    PROT 8.3 09/05/2021    PROT 7.3 02/22/2012    AGRATIO 1.4 09/05/2021 BILITOT 1.1 09/05/2021     Cardiac Enzymes:   Lab Results   Component Value Date    TROPONINI <0.01 09/05/2021    TROPONINI <0.01 08/22/2019    TROPONINI <0.01 09/03/2018     Coags:   Lab Results   Component Value Date    PROTIME 11.3 09/05/2021    INR 1.00 09/05/2021     Problem List:   Patient Active Problem List    Diagnosis Date Noted    Cerebral infarction [facility]) 02/22/2012    Right arm weakness 09/07/2021    TIA (transient ischemic attack) 09/05/2021        Assessment     Confusion, right arm weakness  Hx carotid occlusion left noted in 2012 and he had CEA in 2012 right  EKG NSR nonspecific ST changes    MRI/MRA showed no acute intracranial infarct.  Chronic left frontal lobe infarct.  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  Decreased faint opacification of the left middle cerebral artery.      ALIVIA  / CRI III  sCr 1.6>1.5  Stable     Low magnesium   Magnesium 1.5>1.6  remains on low side   Replace     DMT2  Managed per IM      Hx TIA's  On asa plavix     Tobacco use  Cessation discussed     HLD   LDL 45 optimal   On statin     Colon cancer status post colectomy   Currently on clinical trial at . CT abdomen 9/7/2021    1.  Prominent air in the gastric wall with possible portal venous air or    subtle extraluminal air. 2.  Fluid in the small and large bowel, can be seen with enteritis or    diarrheal disease. 3.  Nonspecific stranding/nodularity inferior to the pancreas.     Differential considerations include inflammatory/infectious or neoplastic    process.        Plan   Magnesium 1.6 / replace today   To have EGD tomorrow  Recommend 30 day event monitor when discharged   Plan stress test when other issues are resolved     Thank you for allowing to us to participate in the care of Ofelia De Los Santos.   Laura Long APRN-CNP-CVNP    [address]   Interventional cardiology  Patient stress test could not be done today. Still and tempting to have EGD performed. At this point heart wise seems stable. Rhythm seems stable. We can do the stress later as an outpatient. Will follow acutely.   Rey Hugo MD, [facility] [place]

## 2021-09-08 NOTE — PROGRESS NOTES
Surgery Daily Progress Note      CC: emphysematous gastritis; portal venous air    SUBJECTIVE:  Patient rested well overnight. Patient reports he continues to have diarrhea with some abdominal pain that comes and goes. Patient denies nausea or vomiting. Patient is afebrile and HDS.    ROS:   A 14 point review of systems was conducted, significant findings as noted above. All other systems negative. OBJECTIVE:    PHYSICAL EXAM:  Vitals:    09/07/21 1445 09/07/21 1845 09/07/21 2234 09/08/21 0336   BP: 135/80 116/69 132/86 111/72   Pulse: 79 74 71 81   Resp: 17 18 16 16   Temp: 98 °F (36.7 °C) 98.4 °F (36.9 °C) 98.1 °F (36.7 °C) 98.2 °F (36.8 °C)   TempSrc: Oral Oral Oral Oral   SpO2: 94% 92% 94%    Weight:       Height:           General appearance: alert, resting in bed  Neuro: A&Ox3, no focal deficits  HENT: trachea midline, no JVD, no LAD  Eyes: PERRLA, no scleral icterus  Chest/Lungs: normal respiratory effort on RA  Cardiovascular: RRR, well perfused  Abdomen: soft, non-tender, non-distended, no guarding/rigidity  Skin: warm and dry  Extremities: no edema , no cyanosis      ASSESSMENT & PLAN:   Arlene Roth is a 73 y.o. male with hx of CVA, s/p R carotid endarterectomy, HTN, acute pancreatitis (2 weeks ago), colon cancer s/p laparoscopic converted to open R hemicolectomy (8/24/20), for whom general surgery is consulted for diarrhea and suspicious findings on CT AP which include gastric pneumatosis with possible portal vein air and inflammatory vs neoplastic changes in pancreas.     - f/u GI consult today; patient needs EGD today to evaluate gastric mucosa  - cont NPO  - cont IV PPI BID  - cont IV Abx  - general surgery will continue to follow closely    Prabhakar Medellin DO, PGY-1  09/08/21  6:34 AM  [phone]

## 2021-09-08 NOTE — PROGRESS NOTES
Patient alert and oriented x4, VSS. Denies pain. NIH a 0. No change in neuro status. Continued diarrhea but no nausea or vomiting. NPO. Tolerating ambulation well and voiding adequately. Fall precautions in place.

## 2021-09-08 NOTE — PLAN OF CARE
Problem: Falls - Risk of:  Goal: Will remain free from falls  Description: Will remain free from falls  Outcome: Ongoing   Patient has remained free of falls. 2/4 bed rails up, bed locked and in lowest position, call light within reach. Patient instructed on use of call light and uses appropriately. Bed alarm on. Non-skid footwear and fall band on. Problem: HEMODYNAMIC STATUS  Goal: Patient has stable vital signs and fluid balance  Outcome: Ongoing   VSS, alert and oriented x4. No change in neuro status.

## 2021-09-08 NOTE — PLAN OF CARE
Problem: Falls - Risk of:  Goal: Will remain free from falls  Description: Will remain free from falls  9/8/2021 1141 by Yeni Solo RN  Outcome: Ongoing     Problem: Falls - Risk of:  Goal: Absence of physical injury  Description: Absence of physical injury  Outcome: Ongoing     Problem: HEMODYNAMIC STATUS  Goal: Patient has stable vital signs and fluid balance  9/8/2021 1141 by Yeni Solo RN  Outcome: Ongoing     Problem: ACTIVITY INTOLERANCE/IMPAIRED MOBILITY  Goal: Mobility/activity is maintained at optimum level for patient  Outcome: Ongoing     Problem: COMMUNICATION IMPAIRMENT  Goal: Ability to express needs and understand communication  Outcome: Ongoing     Problem: Discharge Planning:  Goal: Discharged to appropriate level of care  Description: Discharged to appropriate level of care  Outcome: Ongoing

## 2021-09-09 LAB
ANION GAP SERPL CALCULATED.3IONS-SCNC: 9 MMOL/L (ref 3–16)
BASOPHILS ABSOLUTE: 0 K/UL (ref 0–0.2)
BASOPHILS RELATIVE PERCENT: 0.4 %
BUN BLDV-MCNC: 14 MG/DL (ref 7–20)
CALCIUM SERPL-MCNC: 8 MG/DL (ref 8.3–10.6)
CHLORIDE BLD-SCNC: 107 MMOL/L (ref 99–110)
CO2: 22 MMOL/L (ref 21–32)
CREAT SERPL-MCNC: 1.1 MG/DL (ref 0.8–1.3)
EOSINOPHILS ABSOLUTE: 0.2 K/UL (ref 0–0.6)
EOSINOPHILS RELATIVE PERCENT: 3.4 %
GFR AFRICAN AMERICAN: >60
GFR NON-AFRICAN AMERICAN: >60
GLUCOSE BLD-MCNC: 106 MG/DL (ref 70–99)
GLUCOSE BLD-MCNC: 107 MG/DL (ref 70–99)
GLUCOSE BLD-MCNC: 109 MG/DL (ref 70–99)
GLUCOSE BLD-MCNC: 119 MG/DL (ref 70–99)
GLUCOSE BLD-MCNC: 141 MG/DL (ref 70–99)
HCT VFR BLD CALC: 34.6 % (ref 40.5–52.5)
HEMOGLOBIN: 12.1 G/DL (ref 13.5–17.5)
LV EF: 53 %
LVEF MODALITY: NORMAL
LYMPHOCYTES ABSOLUTE: 1.8 K/UL (ref 1–5.1)
LYMPHOCYTES RELATIVE PERCENT: 30.9 %
MAGNESIUM: 1.6 MG/DL (ref 1.8–2.4)
MCH RBC QN AUTO: 30.9 PG (ref 26–34)
MCHC RBC AUTO-ENTMCNC: 34.9 G/DL (ref 31–36)
MCV RBC AUTO: 88.4 FL (ref 80–100)
MONOCYTES ABSOLUTE: 0.7 K/UL (ref 0–1.3)
MONOCYTES RELATIVE PERCENT: 12.9 %
NEUTROPHILS ABSOLUTE: 3 K/UL (ref 1.7–7.7)
NEUTROPHILS RELATIVE PERCENT: 52.4 %
PDW BLD-RTO: 13.9 % (ref 12.4–15.4)
PERFORMED ON: ABNORMAL
PHOSPHORUS: 2.7 MG/DL (ref 2.5–4.9)
PLATELET # BLD: 154 K/UL (ref 135–450)
PMV BLD AUTO: 11.1 FL (ref 5–10.5)
POTASSIUM REFLEX MAGNESIUM: 3.5 MMOL/L (ref 3.5–5.1)
RBC # BLD: 3.91 M/UL (ref 4.2–5.9)
SODIUM BLD-SCNC: 138 MMOL/L (ref 136–145)
WBC # BLD: 5.8 K/UL (ref 4–11)

## 2021-09-09 PROCEDURE — 2580000003 HC RX 258: Performed by: INTERNAL MEDICINE

## 2021-09-09 PROCEDURE — 84100 ASSAY OF PHOSPHORUS: CPT

## 2021-09-09 PROCEDURE — 99233 SBSQ HOSP IP/OBS HIGH 50: CPT | Performed by: INTERNAL MEDICINE

## 2021-09-09 PROCEDURE — 6370000000 HC RX 637 (ALT 250 FOR IP): Performed by: INTERNAL MEDICINE

## 2021-09-09 PROCEDURE — C9113 INJ PANTOPRAZOLE SODIUM, VIA: HCPCS | Performed by: INTERNAL MEDICINE

## 2021-09-09 PROCEDURE — 93017 CV STRESS TEST TRACING ONLY: CPT

## 2021-09-09 PROCEDURE — 2580000003 HC RX 258: Performed by: SURGERY

## 2021-09-09 PROCEDURE — 99231 SBSQ HOSP IP/OBS SF/LOW 25: CPT | Performed by: SURGERY

## 2021-09-09 PROCEDURE — 78452 HT MUSCLE IMAGE SPECT MULT: CPT

## 2021-09-09 PROCEDURE — A9502 TC99M TETROFOSMIN: HCPCS | Performed by: INTERNAL MEDICINE

## 2021-09-09 PROCEDURE — 3430000000 HC RX DIAGNOSTIC RADIOPHARMACEUTICAL: Performed by: INTERNAL MEDICINE

## 2021-09-09 PROCEDURE — 6370000000 HC RX 637 (ALT 250 FOR IP): Performed by: NURSE PRACTITIONER

## 2021-09-09 PROCEDURE — 2060000000 HC ICU INTERMEDIATE R&B

## 2021-09-09 PROCEDURE — 80048 BASIC METABOLIC PNL TOTAL CA: CPT

## 2021-09-09 PROCEDURE — 83735 ASSAY OF MAGNESIUM: CPT

## 2021-09-09 PROCEDURE — 6360000002 HC RX W HCPCS: Performed by: INTERNAL MEDICINE

## 2021-09-09 PROCEDURE — 36415 COLL VENOUS BLD VENIPUNCTURE: CPT

## 2021-09-09 PROCEDURE — 85025 COMPLETE CBC W/AUTO DIFF WBC: CPT

## 2021-09-09 RX ORDER — LANOLIN ALCOHOL/MO/W.PET/CERES
400 CREAM (GRAM) TOPICAL 2 TIMES DAILY
Status: DISCONTINUED | OUTPATIENT
Start: 2021-09-09 | End: 2021-09-11 | Stop reason: HOSPADM

## 2021-09-09 RX ORDER — MAGNESIUM SULFATE IN WATER 40 MG/ML
2000 INJECTION, SOLUTION INTRAVENOUS ONCE
Status: COMPLETED | OUTPATIENT
Start: 2021-09-09 | End: 2021-09-09

## 2021-09-09 RX ADMIN — TETROFOSMIN 30 MILLICURIE: 1.38 INJECTION, POWDER, LYOPHILIZED, FOR SOLUTION INTRAVENOUS at 09:49

## 2021-09-09 RX ADMIN — TETROFOSMIN 10 MILLICURIE: 1.38 INJECTION, POWDER, LYOPHILIZED, FOR SOLUTION INTRAVENOUS at 07:49

## 2021-09-09 RX ADMIN — Medication 10 ML: at 21:52

## 2021-09-09 RX ADMIN — SODIUM CHLORIDE: 9 INJECTION, SOLUTION INTRAVENOUS at 00:48

## 2021-09-09 RX ADMIN — Medication 10 ML: at 07:48

## 2021-09-09 RX ADMIN — INSULIN LISPRO 1 UNITS: 100 INJECTION, SOLUTION INTRAVENOUS; SUBCUTANEOUS at 21:51

## 2021-09-09 RX ADMIN — PANTOPRAZOLE SODIUM 40 MG: 40 INJECTION, POWDER, FOR SOLUTION INTRAVENOUS at 21:52

## 2021-09-09 RX ADMIN — MAGNESIUM SULFATE HEPTAHYDRATE 2000 MG: 2 INJECTION, SOLUTION INTRAVENOUS at 11:04

## 2021-09-09 RX ADMIN — CHOLESTYRAMINE 4 G: 4 POWDER, FOR SUSPENSION ORAL at 21:52

## 2021-09-09 RX ADMIN — ENOXAPARIN SODIUM 40 MG: 40 INJECTION SUBCUTANEOUS at 11:11

## 2021-09-09 RX ADMIN — REGADENOSON 0.4 MG: 0.08 INJECTION, SOLUTION INTRAVENOUS at 09:09

## 2021-09-09 RX ADMIN — Medication 400 MG: at 21:52

## 2021-09-09 RX ADMIN — PANTOPRAZOLE SODIUM 40 MG: 40 INJECTION, POWDER, FOR SOLUTION INTRAVENOUS at 11:11

## 2021-09-09 RX ADMIN — Medication 10 ML: at 09:48

## 2021-09-09 ASSESSMENT — PAIN SCALES - GENERAL: PAINLEVEL_OUTOF10: 0

## 2021-09-09 NOTE — PROGRESS NOTES
Patient alert and oriented x4, VSS. Denies pain. NIH a 0. No change in neuro status. Diarrhea slow down after taking together Immodium and Cholestyramine. No nausea or vomiting. NPO after midnight. Tolerating ambulation well and voiding adequately. Fall precautions in place. We continue to monitor.

## 2021-09-09 NOTE — PROGRESS NOTES
(ZOFRAN) 4 MG tablet Take 1 tablet by mouth every 8 hours as needed for Nausea Yes Ifeoma Mcleod PA-C   aspirin 81 MG EC tablet Take 81 mg by mouth Yes Historical Provider, MD   atorvastatin (LIPITOR) 40 MG tablet TAKE 1 TABLET BY MOUTH DAILY. Yes Historical Provider, MD   clopidogrel (PLAVIX) 75 MG tablet TAKE 1 TAB BY MOUTH DAILY. Yes Historical Provider, MD   DULoxetine (CYMBALTA) 60 MG extended release capsule TAKE ONE CAPSULE BY MOUTH EVERY DAY Yes Historical Provider, MD   Multiple Vitamins-Minerals (MULTIVITAMIN ADULT EXTRA C PO) Take by mouth Yes Historical Provider, MD   pantoprazole (PROTONIX) 40 MG tablet TAKE 1 TAB BY MOUTH DAILY. Yes Historical Provider, MD   naproxen (NAPROSYN) 500 MG tablet Take 1 tablet by mouth 2 times daily.  Yes Ata Barrientos DO   tamsulosin (FLOMAX) 0.4 MG capsule Take 1 capsule by mouth daily for 5 doses  Tiburcio Marie APRN - CNP   budesonide-formoterol (SYMBICORT) 80-4.5 MCG/ACT AERO Inhale 1 puff into the lungs  Historical Provider, MD        Scheduled Meds:   magnesium sulfate  2,000 mg IntraVENous Once    sodium chloride  1,000 mL IntraVENous Once    magnesium oxide  400 mg Oral Daily    pantoprazole  40 mg IntraVENous BID    atorvastatin  80 mg Oral Daily    aspirin  81 mg Oral Daily    insulin lispro  0-12 Units SubCUTAneous TID WC    insulin lispro  0-6 Units SubCUTAneous Nightly    clopidogrel  75 mg Oral Daily    DULoxetine  60 mg Oral Daily    sodium chloride flush  5-40 mL IntraVENous 2 times per day    enoxaparin  40 mg SubCUTAneous Daily    budesonide  0.25 mg Nebulization BID    And    Arformoterol Tartrate  15 mcg Nebulization BID    cholestyramine light  4 g Oral BID     Continuous Infusions:   sodium chloride 100 mL/hr at 09/09/21 0048    dextrose      sodium chloride 25 mL (09/08/21 1725)     Physical Examination:  Vitals:    09/09/21 0730   BP: 122/73   Pulse: 69   Resp: 18   Temp: 97.9 °F (36.6 °C)   SpO2: 98%      In: 924 [I.V.:703.2]  Out: 675    Wt Readings from Last 3 Encounters:   09/05/21 220 lb (99.8 kg)   09/05/21 220 lb (99.8 kg)   01/21/20 220 lb (99.8 kg)       Intake/Output Summary (Last 24 hours) at 9/9/2021 1026  Last data filed at 9/9/2021 0730  Gross per 24 hour   Intake 924.01 ml   Output 675 ml   Net 249.01 ml       Telemetry: Personally Reviewed    · Constitutional: Cooperative and in no apparent distress, and appears well nourished  · Skin: Warm and pink; no pallor, cyanosis, clubbing, or bruising   · HEENT: Symmetric and normocephalic. · Cardiovascular: Regular rate and rhythm. S1/S2   · Respiratory: Respirations symmetric and unlabored. Lungs clear to auscultation bilaterally, no wheezing, crackles, or rhonchi  · Gastrointestinal: Abdomen soft and round. Bowel sounds normoactive without tenderness or masses. · Musculoskeletal: Bilateral upper and lower extremity strength 5/5 with full ROM  · Neurologic/Psych: Awake and orientated to person, place and time. Calm affect, appropriate mood    Recent Labs     09/07/21  0611 09/08/21  0601 09/09/21  0456   * 137 138   K 3.3* 3.5 3.5    103 107   CO2 23 24 22   PHOS  --  2.6 2.7   BUN 25* 21* 14   CREATININE 1.5* 1.3 1.1   MG 1.60* 1.60* 1.60*     Estimated Creatinine Clearance: 76 mL/min (based on SCr of 1.1 mg/dL).    CBC:   Recent Labs     09/07/21  0104 09/08/21  0601 09/09/21  0457   WBC 9.6 6.8 5.8   HGB 14.8 13.5 12.1*   HCT 42.8 38.7* 34.6*   MCV 88.4 88.3 88.4    171 154     :   Lab Results   Component Value Date    CHOL 95 09/06/2021    HDL 26 09/06/2021    HDL 27 02/23/2012    TRIG 120 09/06/2021     LFTS:   Lab Results   Component Value Date    ALT 20 09/05/2021    AST 22 09/05/2021    ALKPHOS 94 09/05/2021    PROT 8.3 09/05/2021    PROT 7.3 02/22/2012    AGRATIO 1.4 09/05/2021    BILITOT 1.1 09/05/2021     Cardiac Enzymes:   Lab Results   Component Value Date    TROPONINI <0.01 09/05/2021    TROPONINI <0.01 08/22/2019    TROPONINI <0.01 09/03/2018     Coags:   Lab Results   Component Value Date    PROTIME 11.3 09/05/2021    INR 1.00 09/05/2021     Problem List:   Patient Active Problem List    Diagnosis Date Noted    Cerebral infarction [facility]) 02/22/2012    Disorder of portal venous system     Right arm weakness 09/07/2021    TIA (transient ischemic attack) 09/05/2021        Assessment     Confusion, right arm weakness  Hx carotid occlusion left noted in 2012 and he had CEA in 2012 right  EKG NSR nonspecific ST changes    MRI/MRA showed no acute intracranial infarct.  Chronic left frontal lobe infarct.  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  Decreased faint opacification of the left middle cerebral artery.      ALIVIA  / CRI III  sCr 1.6>1.5  Stable     Low magnesium   Magnesium 1.5>1.6  remains on low side   Replace     DMT2  Managed per IM      Hx TIA's  On asa plavix     Tobacco use  Cessation discussed     HLD   LDL 45 optimal   On statin     Colon cancer status post colectomy   Currently on clinical trial at . CT abdomen 9/7/2021    1.  Prominent air in the gastric wall with possible portal venous air or    subtle extraluminal air. 2.  Fluid in the small and large bowel, can be seen with enteritis or    diarrheal disease. 3.  Nonspecific stranding/nodularity inferior to the pancreas.     Differential considerations include inflammatory/infectious or neoplastic    process.        Plan   Magnesium 1.6 / start BID mag oxide 400mg    To have EGD tomorrow  Recommend 30 day event monitor when discharged   Addendum: stress test 9/9/2021    There is no evidence for significant reversible or fixed perfusion defects    to suggest the presence of myocardial ischemia or scar.    The LVEF is 53% with normal LV wall motion.    This is a low risk cardiac scan.      Miguel A Worthington MD, [facility]    Sign off cardiology call for any issues

## 2021-09-09 NOTE — PLAN OF CARE
Problem: Falls - Risk of:  Goal: Will remain free from falls  Description: Will remain free from falls  9/9/2021 0832 by Tiesha Wilhelm RN  Outcome: Ongoing     Problem: HEMODYNAMIC STATUS  Goal: Patient has stable vital signs and fluid balance  9/9/2021 0832 by Tiesha Wilhelm RN  Outcome: Ongoing

## 2021-09-09 NOTE — CARE COORDINATION
SW rounded again on this date. Patient is from home with spouse and daughter. Independent at baseline. Therapy cleared patient on 9/6 for home. SW can be consulted should specific needs arise.      FLORENCIO Choe, [facility]  Social Work/Case Management  [facility]   [phone]

## 2021-09-09 NOTE — PROGRESS NOTES
Surgery Daily Progress Note      CC: emphysematous gastritis; portal venous air    SUBJECTIVE:  Patient rested well overnight. Patient reports he continues to have diarrhea with some abdominal pain that comes and goes. Patient denies nausea or vomiting. Patient is afebrile and HDS.    ROS:   A 14 point review of systems was conducted, significant findings as noted above. All other systems negative. OBJECTIVE:    PHYSICAL EXAM:  Vitals:    09/08/21 1448 09/08/21 1916 09/09/21 0016 09/09/21 0424   BP: 108/66 (!) 148/87 137/77 (!) 151/77   Pulse: 74 70 64 63   Resp: 16 18 18 18   Temp: 97.9 °F (36.6 °C) 97.8 °F (36.6 °C) 98.2 °F (36.8 °C) 97.8 °F (36.6 °C)   TempSrc: Oral Oral Oral Oral   SpO2: 93% 99% 97% 96%   Weight:       Height:           General appearance: alert, resting in bed  Neuro: A&Ox3, no focal deficits  HENT: trachea midline, no JVD, no LAD  Eyes: PERRLA, no scleral icterus  Chest/Lungs: normal respiratory effort on RA  Cardiovascular: RRR, well perfused  Abdomen: soft, non-tender, non-distended, no guarding/rigidity  Skin: warm and dry  Extremities: no edema , no cyanosis      ASSESSMENT & PLAN:   Nisha Baird is a 73 y.o. male with hx of CVA, s/p R carotid endarterectomy, HTN, acute pancreatitis (2 weeks ago), colon cancer s/p laparoscopic converted to open R hemicolectomy (8/24/20), for whom general surgery is consulted for diarrhea and suspicious findings on CT AP which include gastric pneumatosis with possible portal vein air and inflammatory vs neoplastic changes in pancreas. - f/u EGD    - cont NPO  - cont IV PPI BID  - cont IV Abx  - general surgery will continue to follow closely    INÉS Foy  Resident Support Staff  [phone]    Attending [phone] Statement  I performed a history and physical examination on the patient and discussed the management with the nurse practitioner.  I reviewed and agree with the findings and plan as documented in her note .    CC: Emphysematous gastritis, portal venous gas, pneumatosis  Resting comfortable. No abd pain. Tolerated clears.   Vitals:    09/09/21 0730   BP: 122/73   Pulse: 69   Resp: 18   Temp: 97.9 °F (36.6 °C)   SpO2: 98%     Abd soft, nontender, nondistended  CBC, BMP reviewed    Benign pneumatosis likely from COPD, pancreatitis  Pending EGD  Unlikely to require surgery as no abd pain, nontender, stable, no signs of ischemia  Will follow    Electronically signed by Angy June MD on 9/9/21 at 7:49 AM EDT

## 2021-09-09 NOTE — PROGRESS NOTES
Hospitalist Progress Note      PCP: Alexis Tovar MD    Date of Admission: 9/5/2021    Chief Complaint: Confusion, right arm weakness    Hospital Course: Admitted for right-sided weakness with concern to TIA/stroke. MRI/MRA head negative for acute stroke. 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 Patient with diarrhea. C. difficile negative. GI PCR negative. CT abdomen showed gastric pneumatosis with possible portal vein air, enteritis with nonspecific stranding/nodularity of the pancreas. General surgery consulted, recommending EGD. GI consulted, plan for EGD    Subjective: Patient states his abdominal cramping and diarrhea is getting better. Denies fever chills, nausea or vomiting. Going for nuclear stress test today.     Medications:  Reviewed    Infusion Medications    sodium chloride 100 mL/hr at 09/09/21 0048    dextrose      sodium chloride 25 mL (09/08/21 1725)     Scheduled Medications    magnesium sulfate  2,000 mg IntraVENous Once    sodium chloride  1,000 mL IntraVENous Once    magnesium oxide  400 mg Oral Daily    pantoprazole  40 mg IntraVENous BID    atorvastatin  80 mg Oral Daily    aspirin  81 mg Oral Daily    insulin lispro  0-12 Units SubCUTAneous TID WC    insulin lispro  0-6 Units SubCUTAneous Nightly    clopidogrel  75 mg Oral Daily    DULoxetine  60 mg Oral Daily    sodium chloride flush  5-40 mL IntraVENous 2 times per day    enoxaparin  40 mg SubCUTAneous Daily    budesonide  0.25 mg Nebulization BID    And    Arformoterol Tartrate  15 mcg Nebulization BID    cholestyramine light  4 g Oral BID     PRN Meds: glucose, dextrose, glucagon (rDNA), dextrose, sodium chloride flush, sodium chloride, ondansetron **OR** ondansetron, polyethylene glycol, perflutren lipid microspheres, loperamide      Intake/Output Summary (Last 24 hours) at 9/9/2021 None seen 10/16/2020    BACTERIA 1+ 02/22/2012    RBCUA  10/16/2020    BLOODU LARGE 10/16/2020    SPECGRAV 1.010 10/16/2020    GLUCOSEU 100 10/16/2020    GLUCOSEU NEGATIVE 02/22/2012       Radiology:  CT ABDOMEN PELVIS W IV CONTRAST Additional Contrast? None   Final Result   1. Prominent air in the gastric wall with possible portal venous air or    subtle extraluminal air. 2.  Fluid in the small and large bowel, can be seen with enteritis or    diarrheal disease. 3.  Nonspecific stranding/nodularity inferior to the pancreas. Differential considerations include inflammatory/infectious or neoplastic    process. 4.  Additional findings, as above. MRA NECK WO CONTRAST   Final Result      1. No acute intracranial infarct. Chronic left frontal lobe infarct. 2.  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 Decreased    faint opacification of the left middle cerebral artery is new compared to 2012. Recommend neurology and vascular surgery consultation. Findings were discussed with the patient's nurse Alexandre Saldivar by Dr. Kady Villegas on 9/5/2021 at 1020 hours. MRI brain without contrast   Final Result      1. No acute intracranial infarct. Chronic left frontal lobe infarct. 2.  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 Decreased    faint opacification of the left middle cerebral artery is new compared to 2012. Recommend neurology and vascular surgery consultation. Findings were discussed with the patient's nurse Alexandre Saldivar by Dr. Kady Villegas on 9/5/2021 at 1020 hours.          MRA HEAD WO CONTRAST   Final Result 1.  No acute intracranial infarct. Chronic left frontal lobe infarct. 2.  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 Decreased    faint opacification of the left middle cerebral artery is new compared to 2012. Recommend neurology and vascular surgery consultation. Findings were discussed with the patient's nurse Trent Plaza by Dr. Ruben Ruth on 9/5/2021 at 1020 hours. NM MYOCARDIAL SPECT REST EXERCISE OR RX    (Results Pending)           Assessment/Plan:    #Transient Right-sided arm weakness due to hypotension in the setting of chronic complete occlusion of the left internal carotid artery  Patient has history of multiple TIAs  MRI/MRA showed no acute intracranial infarct. Chronic left frontal lobe infarct. 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 Decreased faint opacification of the left middle cerebral artery. Neurosurgery consulted  Continue telemetry  Hemoglobin A1c 6.7  Lipid panel with total cholesterol 95, LDL 45, HDL 26  Echo with an EF of 40 to 45%. No PFO  Continue aspirin, Plavix   Continue statin, dose increased per neurology  Maintain normotension, avoid hypotension  Long-term cardiac monitoritoring to assess for A. Fib  Cardiology consulted, recommended 30-day event monitor on discharge  Neurology signed off, appreciate recommendation    #Syncope  Patient had a rapid response called earlier this morning for syncopal episode  Patient was noted to be hypotensive and tachycardic. Received 1 L of IV fluids.   Continue telemetry  Echo showed an EF of 40 to 45% with hypokinesis of septal, inferior and anteroseptal walls  Nuclear stress test showed no evidence for significant reversible or fixed perfusion defects to suggest the presence of myocardial ischemia or scar. The LVEF is 53% with normal LV wall motion. This is a low risk cardiac scan. Cardiology following    #Diarrhea   Hx of c diff diarrhea treated recently  C. difficile negative. GI PCR F  CT abdomen was suggestive of gastric pneumatosis with possible portal vein air, enteritis or diarrheal disease with nonspecific stranding/nodularity inferior to the pancreas suggestive of inflammatory/infectious or neoplastic process  Patient does not want to take Questran  Continue as needed Imodium  General surgery following, recommending EGD by GI  GI consulted, plan for EGD      #Leukocytosis likely reactive-resolved  Chest x-ray without any consolidation. UA negative. We will continue to monitor     #Diabetes mellitus type 2  HbA1c in July 6.7%  On Metformin at home  Monitor blood glucose  Continue carb controlled diet with sliding scale insulin     #CKD stage III  Creatinine 1.5 appears to be baseline  Monitor creatinine, stable     #Colon cancer status post colectomy   Currently on clinical trial at [facility].     #Active tobacco abuse  Still smokes half pack per day. Counseled guarding smoking cessation. Patient reported that he had tried nicotine patch and Chantix did not help.     DVT Prophylaxis: lovenox  Diet: ADULT DIET;  Clear Liquid  Diet NPO  Code Status: Full Code    Dispo -pending EGD, GI/surgery Dar Razo MD

## 2021-09-10 ENCOUNTER — ANESTHESIA EVENT (OUTPATIENT)
Dept: ENDOSCOPY | Age: 71
DRG: 068 | End: 2021-09-10
Payer: MEDICARE

## 2021-09-10 ENCOUNTER — ANESTHESIA (OUTPATIENT)
Dept: ENDOSCOPY | Age: 71
DRG: 068 | End: 2021-09-10
Payer: MEDICARE

## 2021-09-10 VITALS
RESPIRATION RATE: 17 BRPM | OXYGEN SATURATION: 98 % | DIASTOLIC BLOOD PRESSURE: 88 MMHG | SYSTOLIC BLOOD PRESSURE: 154 MMHG

## 2021-09-10 LAB
ANION GAP SERPL CALCULATED.3IONS-SCNC: 9 MMOL/L (ref 3–16)
BUN BLDV-MCNC: 8 MG/DL (ref 7–20)
CALCIUM SERPL-MCNC: 8.2 MG/DL (ref 8.3–10.6)
CHLORIDE BLD-SCNC: 108 MMOL/L (ref 99–110)
CO2: 24 MMOL/L (ref 21–32)
CREAT SERPL-MCNC: 1 MG/DL (ref 0.8–1.3)
GFR AFRICAN AMERICAN: >60
GFR NON-AFRICAN AMERICAN: >60
GLUCOSE BLD-MCNC: 105 MG/DL (ref 70–99)
GLUCOSE BLD-MCNC: 145 MG/DL (ref 70–99)
GLUCOSE BLD-MCNC: 87 MG/DL (ref 70–99)
GLUCOSE BLD-MCNC: 97 MG/DL (ref 70–99)
GLUCOSE BLD-MCNC: 98 MG/DL (ref 70–99)
MAGNESIUM: 2 MG/DL (ref 1.8–2.4)
PERFORMED ON: ABNORMAL
PERFORMED ON: NORMAL
POTASSIUM REFLEX MAGNESIUM: 3.4 MMOL/L (ref 3.5–5.1)
SODIUM BLD-SCNC: 141 MMOL/L (ref 136–145)

## 2021-09-10 PROCEDURE — 80048 BASIC METABOLIC PNL TOTAL CA: CPT

## 2021-09-10 PROCEDURE — 6370000000 HC RX 637 (ALT 250 FOR IP): Performed by: INTERNAL MEDICINE

## 2021-09-10 PROCEDURE — 83735 ASSAY OF MAGNESIUM: CPT

## 2021-09-10 PROCEDURE — 0DB68ZX EXCISION OF STOMACH, VIA NATURAL OR ARTIFICIAL OPENING ENDOSCOPIC, DIAGNOSTIC: ICD-10-PCS | Performed by: INTERNAL MEDICINE

## 2021-09-10 PROCEDURE — 99231 SBSQ HOSP IP/OBS SF/LOW 25: CPT | Performed by: SURGERY

## 2021-09-10 PROCEDURE — 88305 TISSUE EXAM BY PATHOLOGIST: CPT

## 2021-09-10 PROCEDURE — 6360000002 HC RX W HCPCS: Performed by: NURSE ANESTHETIST, CERTIFIED REGISTERED

## 2021-09-10 PROCEDURE — 2580000003 HC RX 258: Performed by: INTERNAL MEDICINE

## 2021-09-10 PROCEDURE — 2580000003 HC RX 258: Performed by: NURSE ANESTHETIST, CERTIFIED REGISTERED

## 2021-09-10 PROCEDURE — 36415 COLL VENOUS BLD VENIPUNCTURE: CPT

## 2021-09-10 PROCEDURE — 3609012400 HC EGD TRANSORAL BIOPSY SINGLE/MULTIPLE: Performed by: INTERNAL MEDICINE

## 2021-09-10 PROCEDURE — 2709999900 HC NON-CHARGEABLE SUPPLY: Performed by: INTERNAL MEDICINE

## 2021-09-10 PROCEDURE — 2060000000 HC ICU INTERMEDIATE R&B

## 2021-09-10 PROCEDURE — 3700000000 HC ANESTHESIA ATTENDED CARE: Performed by: INTERNAL MEDICINE

## 2021-09-10 PROCEDURE — 6360000002 HC RX W HCPCS: Performed by: INTERNAL MEDICINE

## 2021-09-10 PROCEDURE — 6360000002 HC RX W HCPCS

## 2021-09-10 PROCEDURE — 2580000003 HC RX 258: Performed by: SURGERY

## 2021-09-10 PROCEDURE — 6370000000 HC RX 637 (ALT 250 FOR IP): Performed by: NURSE PRACTITIONER

## 2021-09-10 PROCEDURE — 7100000010 HC PHASE II RECOVERY - FIRST 15 MIN: Performed by: INTERNAL MEDICINE

## 2021-09-10 PROCEDURE — 2500000003 HC RX 250 WO HCPCS: Performed by: NURSE ANESTHETIST, CERTIFIED REGISTERED

## 2021-09-10 PROCEDURE — 7100000011 HC PHASE II RECOVERY - ADDTL 15 MIN: Performed by: INTERNAL MEDICINE

## 2021-09-10 PROCEDURE — C9113 INJ PANTOPRAZOLE SODIUM, VIA: HCPCS | Performed by: INTERNAL MEDICINE

## 2021-09-10 RX ORDER — SODIUM CHLORIDE 9 MG/ML
INJECTION, SOLUTION INTRAVENOUS CONTINUOUS PRN
Status: DISCONTINUED | OUTPATIENT
Start: 2021-09-10 | End: 2021-09-10 | Stop reason: SDUPTHER

## 2021-09-10 RX ORDER — PROPOFOL 10 MG/ML
INJECTION, EMULSION INTRAVENOUS PRN
Status: DISCONTINUED | OUTPATIENT
Start: 2021-09-10 | End: 2021-09-10 | Stop reason: SDUPTHER

## 2021-09-10 RX ORDER — FENTANYL CITRATE 50 UG/ML
25 INJECTION, SOLUTION INTRAMUSCULAR; INTRAVENOUS EVERY 5 MIN PRN
Status: DISCONTINUED | OUTPATIENT
Start: 2021-09-10 | End: 2021-09-10 | Stop reason: HOSPADM

## 2021-09-10 RX ORDER — LIDOCAINE HYDROCHLORIDE 20 MG/ML
INJECTION, SOLUTION INFILTRATION; PERINEURAL PRN
Status: DISCONTINUED | OUTPATIENT
Start: 2021-09-10 | End: 2021-09-10 | Stop reason: SDUPTHER

## 2021-09-10 RX ORDER — POTASSIUM CHLORIDE 7.45 MG/ML
10 INJECTION INTRAVENOUS
Status: COMPLETED | OUTPATIENT
Start: 2021-09-10 | End: 2021-09-10

## 2021-09-10 RX ORDER — ONDANSETRON 2 MG/ML
4 INJECTION INTRAMUSCULAR; INTRAVENOUS
Status: DISCONTINUED | OUTPATIENT
Start: 2021-09-10 | End: 2021-09-10 | Stop reason: HOSPADM

## 2021-09-10 RX ADMIN — PROPOFOL 60 MG: 10 INJECTION, EMULSION INTRAVENOUS at 15:09

## 2021-09-10 RX ADMIN — POTASSIUM CHLORIDE 10 MEQ: 7.45 INJECTION INTRAVENOUS at 15:55

## 2021-09-10 RX ADMIN — SODIUM CHLORIDE: 9 INJECTION, SOLUTION INTRAVENOUS at 15:04

## 2021-09-10 RX ADMIN — ATORVASTATIN CALCIUM 80 MG: 80 TABLET, FILM COATED ORAL at 09:23

## 2021-09-10 RX ADMIN — Medication 400 MG: at 09:23

## 2021-09-10 RX ADMIN — PANTOPRAZOLE SODIUM 40 MG: 40 INJECTION, POWDER, FOR SOLUTION INTRAVENOUS at 21:31

## 2021-09-10 RX ADMIN — PROPOFOL 70 MCG/KG/MIN: 10 INJECTION, EMULSION INTRAVENOUS at 15:09

## 2021-09-10 RX ADMIN — LIDOCAINE HYDROCHLORIDE 100 MG: 20 INJECTION, SOLUTION INFILTRATION; PERINEURAL at 15:09

## 2021-09-10 RX ADMIN — DULOXETINE HYDROCHLORIDE 60 MG: 60 CAPSULE, DELAYED RELEASE ORAL at 09:23

## 2021-09-10 RX ADMIN — PANTOPRAZOLE SODIUM 40 MG: 40 INJECTION, POWDER, FOR SOLUTION INTRAVENOUS at 09:24

## 2021-09-10 RX ADMIN — INSULIN LISPRO 1 UNITS: 100 INJECTION, SOLUTION INTRAVENOUS; SUBCUTANEOUS at 21:35

## 2021-09-10 RX ADMIN — Medication 10 ML: at 21:31

## 2021-09-10 RX ADMIN — Medication 400 MG: at 21:31

## 2021-09-10 RX ADMIN — POTASSIUM CHLORIDE 10 MEQ: 7.45 INJECTION INTRAVENOUS at 11:50

## 2021-09-10 RX ADMIN — SODIUM CHLORIDE: 9 INJECTION, SOLUTION INTRAVENOUS at 19:00

## 2021-09-10 RX ADMIN — POTASSIUM CHLORIDE 10 MEQ: 7.45 INJECTION INTRAVENOUS at 12:39

## 2021-09-10 RX ADMIN — Medication 10 ML: at 11:53

## 2021-09-10 RX ADMIN — POTASSIUM CHLORIDE 10 MEQ: 7.45 INJECTION INTRAVENOUS at 14:06

## 2021-09-10 ASSESSMENT — PULMONARY FUNCTION TESTS
PIF_VALUE: 1

## 2021-09-10 ASSESSMENT — PAIN SCALES - GENERAL
PAINLEVEL_OUTOF10: 0

## 2021-09-10 NOTE — H&P
History and Physical / Pre-Sedation Assessment    Andry Rivera is a 73 y.o. male who presents today for EGD procedure. PMHx:    Past Medical History:   Diagnosis Date    Cerebral artery occlusion with cerebral infarction ([facility] [address]     Depression     Hypertension        Medications:    Prior to Admission medications    Medication Sig Start Date End Date Taking? Authorizing Provider   metFORMIN (GLUCOPHAGE) 500 MG tablet Take 500 mg by mouth 2 times daily (with meals)   Yes Historical Provider, MD   ondansetron (ZOFRAN) 4 MG tablet Take 1 tablet by mouth every 8 hours as needed for Nausea 8/22/19  Yes Paramjit Maynard PA-C   aspirin 81 MG EC tablet Take 81 mg by mouth   Yes Historical Provider, MD   atorvastatin (LIPITOR) 40 MG tablet TAKE 1 TABLET BY MOUTH DAILY. 7/26/18  Yes Historical Provider, MD   clopidogrel (PLAVIX) 75 MG tablet TAKE 1 TAB BY MOUTH DAILY. 8/20/18  Yes Historical Provider, MD   DULoxetine (CYMBALTA) 60 MG extended release capsule TAKE ONE CAPSULE BY MOUTH EVERY DAY 6/18/18  Yes Historical Provider, MD   Multiple Vitamins-Minerals (MULTIVITAMIN ADULT EXTRA C PO) Take by mouth   Yes Historical Provider, MD   pantoprazole (PROTONIX) 40 MG tablet TAKE 1 TAB BY MOUTH DAILY. 5/14/18  Yes Historical Provider, MD   naproxen (NAPROSYN) 500 MG tablet Take 1 tablet by mouth 2 times daily.  3/30/14  Yes Alisia Barrientos DO   tamsulosin (FLOMAX) 0.4 MG capsule Take 1 capsule by mouth daily for 5 doses 10/16/20 10/21/20  INÉS Kinney - CNP   budesonide-formoterol (SYMBICORT) 80-4.5 MCG/ACT AERO Inhale 1 puff into the lungs 4/5/18   Historical Provider, MD       Allergies: No Known Allergies    PSHx:    Past Surgical History:   Procedure Laterality Date    NOSE SURGERY      SALPINGO-OOPHORECTOMY      WISDOM TOOTH EXTRACTION         Social Hx:    Social History     Socioeconomic History    Marital status:      Spouse name: Not on file    Number of children: Not on file    Years of education: Not on file    Highest education level: Not on file   Occupational History    Not on file   Tobacco Use    Smoking status: Current Every Day Smoker     Packs/day: 0.50     Types: Cigarettes    Smokeless tobacco: Never Used   Vaping Use    Vaping Use: Never used   Substance and Sexual Activity    Alcohol use: Yes     Comment: occ     Drug use: No    Sexual activity: Not on file   Other Topics Concern    Not on file   Social History Narrative    Not on file     Social Determinants of Health     Financial Resource Strain:     Difficulty of Paying Living Expenses:    Food Insecurity:     Worried About Running Out of Food in the Last Year:     Ran Out of Food in the Last Year:    Transportation Needs:     Lack of Transportation (Medical):  Lack of Transportation (Non-Medical):    Physical Activity:     Days of Exercise per Week:     Minutes of Exercise per Session:    Stress:     Feeling of Stress :    Social Connections:     Frequency of Communication with Friends and Family:     Frequency of Social Gatherings with Friends and Family:     Attends Buddhism Services:     Active Member of Clubs or Organizations:     Attends Club or Organization Meetings:     Marital Status:    Intimate Partner Violence:     Fear of Current or Ex-Partner:     Emotionally Abused:     Physically Abused:     Sexually Abused:        Family Hx: History reviewed. No pertinent family history. Physical Exam:  Vital Signs: BP (!) 145/76   Pulse 61   Temp 98.1 °F (36.7 °C) (Oral)   Resp 16   Ht 5' 11\" (1.803 m)   Wt 220 lb (99.8 kg)   SpO2 97%   BMI 30.68 kg/m²    Pulmonary: Normal  Cardiac: Normal  Abdomen: Normal    Pre-Procedure Assessment / Plan:  ASA Classification: Class 2 - A normal healthy patient with mild systemic disease  Level of Sedation Plan: Deep sedation   Mallampati Score:  I (soft palate, uvula, fauces, tonsillar pillars visible)  Post Procedure plan: Return to same level of care    . I assessed the patient and find that the patient is in satisfactory condition to proceed with the planned procedure and sedation plan. Risks/benefits/alternatives of procedure discussed with patient and any present family members. Risks including, but not limited to: bleeding, perforation, post polypectomy syndrome, splenic injury, need for additional procedures or surgery, risks of anesthesia. Patient understands it is their responsibility to call office for pathology results if they do not hear from my office within 1-2 weeks. All questions answered.     Ghazala Mar MD  9/10/2021

## 2021-09-10 NOTE — ANESTHESIA PRE PROCEDURE
Department of Anesthesiology  Preprocedure Note       Name:  Cesar Litten   Age:  73 y.o.  :  1950                                          MRN:  [id number]         Date:  9/10/2021      Surgeon: Pia Boyd):  Michelle Mercado MD    Procedure: Procedure(s):  ESOPHAGOGASTRODUODENOSCOPY    Medications prior to admission:   Prior to Admission medications    Medication Sig Start Date End Date Taking? Authorizing Provider   metFORMIN (GLUCOPHAGE) 500 MG tablet Take 500 mg by mouth 2 times daily (with meals)   Yes Historical Provider, MD   ondansetron (ZOFRAN) 4 MG tablet Take 1 tablet by mouth every 8 hours as needed for Nausea 19  Yes Ernestine Bentley PA-C   aspirin 81 MG EC tablet Take 81 mg by mouth   Yes Historical Provider, MD   atorvastatin (LIPITOR) 40 MG tablet TAKE 1 TABLET BY MOUTH DAILY. 18  Yes Historical Provider, MD   clopidogrel (PLAVIX) 75 MG tablet TAKE 1 TAB BY MOUTH DAILY. 18  Yes Historical Provider, MD   DULoxetine (CYMBALTA) 60 MG extended release capsule TAKE ONE CAPSULE BY MOUTH EVERY DAY 18  Yes Historical Provider, MD   Multiple Vitamins-Minerals (MULTIVITAMIN ADULT EXTRA C PO) Take by mouth   Yes Historical Provider, MD   pantoprazole (PROTONIX) 40 MG tablet TAKE 1 TAB BY MOUTH DAILY. 18  Yes Historical Provider, MD   naproxen (NAPROSYN) 500 MG tablet Take 1 tablet by mouth 2 times daily.  3/30/14  Yes Eli Barrientos DO   tamsulosin [facility]) 0.4 MG capsule Take 1 capsule by mouth daily for 5 doses 10/16/20 10/21/20  Dawood Howard APRN - CNP   budesonide-formoterol (SYMBICORT) 80-4.5 MCG/ACT AERO Inhale 1 puff into the lungs 18   Historical Provider, MD       Current medications:    Current Facility-Administered Medications   Medication Dose Route Frequency Provider Last Rate Last Admin    potassium chloride 10 mEq/100 mL IVPB (Peripheral Line)  10 mEq IntraVENous Q1H Augustine Lozoya  mL/hr at 09/10/21 1239 10 mEq at 09/10/21 1239  magnesium oxide (MAG-OX) tablet 400 mg  400 mg Oral BID Deangelo Costa INÉS - CNP   400 mg at 09/10/21 7123    0.9 % sodium chloride bolus  1,000 mL IntraVENous Once Taylor Ta MD   Held at 09/07/21 0250    pantoprazole (PROTONIX) injection 40 mg  40 mg IntraVENous BID Norma Ibarra MD   40 mg at 09/10/21 0924    0.9 % sodium chloride infusion   IntraVENous Continuous Manpreet Lloyd  mL/hr at 09/09/21 0048 New Bag at 09/09/21 0048    atorvastatin (LIPITOR) tablet 80 mg  80 mg Oral Daily Marcojolynnmedina Maxi APRN - CNP   80 mg at 09/10/21 3183    aspirin EC tablet 81 mg  81 mg Oral Daily Alicia Henderson MD   81 mg at 09/08/21 1309    glucose (GLUTOSE) 40 % oral gel 15 g  15 g Oral PRN Alicia Henderson MD        dextrose 50 % IV solution  12.5 g IntraVENous PRN Alicia Henderson MD        glucagon (rDNA) injection 1 mg  1 mg IntraMUSCular PRN Alicia Henderson MD        dextrose 5 % solution  100 mL/hr IntraVENous PRN Alicia Henderson MD        insulin lispro (1 Unit Dial) 0-12 Units  0-12 Units SubCUTAneous TID WC Alicia Henderson MD   2 Units at 09/07/21 1203    insulin lispro (1 Unit Dial) 0-6 Units  0-6 Units SubCUTAneous Nightly Alicia Henderson MD   1 Units at 09/09/21 2151    clopidogrel (PLAVIX) tablet 75 mg  75 mg Oral Daily Alicia Henderson MD   75 mg at 09/08/21 1310    DULoxetine (CYMBALTA) extended release capsule 60 mg  60 mg Oral Daily Alicia Henderson MD   60 mg at 09/10/21 0923    sodium chloride flush 0.9 % injection 5-40 mL  5-40 mL IntraVENous 2 times per day Alicia Henderson MD   10 mL at 09/10/21 1153    sodium chloride flush 0.9 % injection 5-40 mL  5-40 mL IntraVENous PRN Alicia Henderson MD        0.9 % sodium chloride infusion  25 mL IntraVENous PRN Alicia Henderson  mL/hr at 09/08/21 1725 25 mL at 09/08/21 1725    ondansetron (ZOFRAN-ODT) disintegrating tablet 4 mg  4 mg Oral Q8H PRN Alicia Henderson MD   4 mg at 09/06/21 1550    Or    ondansetron (ZOFRAN) injection 4 mg  4 mg IntraVENous Q6H PRN Amber Spring MD   4 mg at 09/07/21 0316    polyethylene glycol (GLYCOLAX) packet 17 g  17 g Oral Daily PRN Amber Spring MD        enoxaparin (LOVENOX) injection 40 mg  40 mg SubCUTAneous Daily Amber Spring MD   40 mg at 09/09/21 1111    perflutren lipid microspheres (DEFINITY) injection 1.65 mg  1.5 mL IntraVENous ONCE PRN Amber Spring MD        budesonide (PULMICORT) nebulizer suspension 250 mcg  0.25 mg Nebulization BID Amber Spring MD        And    Arformoterol Tartrate (BROVANA) nebulizer solution 15 mcg  15 mcg Nebulization BID Amber Spring MD        cholestyramine light packet 4 g  4 g Oral BID Amber Spring MD   4 g at 09/09/21 2152    loperamide (IMODIUM) capsule 2 mg  2 mg Oral 4x Daily PRN Amber Spring MD   2 mg at 09/08/21 2053       Allergies:  No Known Allergies    Problem List:    Patient Active Problem List   Diagnosis Code    Cerebral infarction ([facility] I63.9    TIA (transient ischemic attack) G45.9    Right arm weakness R29.898    Disorder of portal venous system I87.9       Past Medical History:        Diagnosis Date    [facility]     Depression     Hypertension        Past Surgical History:        Procedure Laterality Date    NOSE SURGERY      SALPINGO-OOPHORECTOMY      WISDOM TOOTH EXTRACTION         Social History:    Social History     Tobacco Use    Smoking status: Current Every Day Smoker     Packs/day: 0.50     Types: Cigarettes    Smokeless tobacco: Never Used   Substance Use Topics    Alcohol use: Yes     Comment: occ                                 Ready to quit: Not Answered  Counseling given: Not Answered      Vital Signs (Current):   Vitals:    09/09/21 1825 09/09/21 2210 09/10/21 0235 09/10/21 0700   BP: 114/69 (!) 167/89 (!) 152/76 (!) 145/76   Pulse: 62 62 61 61   Resp: 16 16 16 16   Temp: 97.7 °F (36.5 °C) 97.6 °F (36.4 °C) 97.6 °F (36.4 °C) 98.1 °F (36.7 °C)   TempSrc: Oral Oral Oral Oral SpO2: 97% 99% 98% 97%   Weight:       Height:                                                  BP Readings from Last 3 Encounters:   09/10/21 (!) 145/76   09/05/21 (!) 114/94   10/16/20 (!) 142/70       NPO Status: Time of last liquid consumption: 0000                        Time of last solid consumption: 1700                        Date of last liquid consumption: 09/10/21                        Date of last solid food consumption: 09/07/21    BMI:   Wt Readings from Last 3 Encounters:   09/05/21 220 lb (99.8 kg)   09/05/21 220 lb (99.8 kg)   01/21/20 220 lb (99.8 kg)     Body mass index is 30.68 kg/m².     CBC:   Lab Results   Component Value Date    WBC 5.8 09/09/2021    RBC 3.91 09/09/2021    HGB 12.1 09/09/2021    HCT 34.6 09/09/2021    MCV 88.4 09/09/2021    RDW 13.9 09/09/2021     09/09/2021       CMP:   Lab Results   Component Value Date     09/10/2021    K 3.4 09/10/2021     09/10/2021    CO2 24 09/10/2021    BUN 8 09/10/2021    CREATININE 1.0 09/10/2021    GFRAA >60 09/10/2021    GFRAA >60 03/06/2012    AGRATIO 1.4 09/05/2021    LABGLOM >60 09/10/2021    GLUCOSE 105 09/10/2021    PROT 8.3 09/05/2021    PROT 7.3 02/22/2012    CALCIUM 8.2 09/10/2021    BILITOT 1.1 09/05/2021    ALKPHOS 94 09/05/2021    AST 22 09/05/2021    ALT 20 09/05/2021       POC Tests:   Recent Labs     09/10/21  1200   POCGLU 97       Coags:   Lab Results   Component Value Date    PROTIME 11.3 09/05/2021    INR 1.00 09/05/2021    APTT 24.6 03/06/2012       HCG (If Applicable): No results found for: PREGTESTUR, PREGSERUM, HCG, HCGQUANT     ABGs: No results found for: PHART, PO2ART, XWW4YRX, LED8HHT, BEART, Q2ZCVVYR     Type & Screen (If Applicable):  No results found for: LABABO, LABRH    Drug/Infectious Status (If Applicable):  No results found for: HIV, HEPCAB    COVID-19 Screening (If Applicable):   Lab Results   Component Value Date    COVID19 Not Detected 09/07/2021           Anesthesia Evaluation  Patient summary reviewed and Nursing notes reviewed  Airway: Mallampati: II  TM distance: >3 FB   Neck ROM: full  Mouth opening: > = 3 FB Dental: normal exam         Pulmonary:Negative Pulmonary ROS and normal exam                               Cardiovascular:Negative CV ROS  Exercise tolerance: good (>4 METS),   (+) hypertension: mild,         Rhythm: regular  Rate: normal           Beta Blocker:  Not on Beta Blocker         Neuro/Psych:   (+) CVA: no interval change, TIA, psychiatric history: stable with treatment            GI/Hepatic/Renal: Neg GI/Hepatic/Renal ROS            Endo/Other: Negative Endo/Other ROS                    Abdominal:       Abdomen: soft. Vascular: negative vascular ROS. Other Findings:             Anesthesia Plan      MAC     ASA 3       Induction: intravenous. MIPS: Postoperative opioids intended and Prophylactic antiemetics administered. Anesthetic plan and risks discussed with patient. Use of blood products discussed with patient whom consented to blood products. Plan discussed with attending and CRNA.     Attending anesthesiologist reviewed and agrees with Preprocedure content              Elsa Eden DO   9/10/2021

## 2021-09-10 NOTE — PROGRESS NOTES
Patient is alert and oriented. Vital signs are stable. No complaints of pain. NIH is 0. No changes in neuro assessment. Patient ambulates x1  Stand by assist. Patient tolerates ambulation well. Patient voiding without complication. Patient showered. Bed is in the lowest position. Bed alarm is activated. Call light is within reach. Will continue to monitor and reassess.

## 2021-09-10 NOTE — PROGRESS NOTES
Talked with wife ,concerning procedure , and she mentioned concerns with blood thinners , nurse in endoscopy aware , given wife telephone number to make contact with concerns ,

## 2021-09-10 NOTE — PROGRESS NOTES
Surgery Daily Progress Note      CC: emphysematous gastritis; portal venous air    SUBJECTIVE:  Patient rested well overnight. Patient reports his diarrhea has resolved. Patient denies nausea or vomiting. Patient is afebrile and HDS. Had stress test yesterday so EGD was delayed. ROS:   A 14 point review of systems was conducted, significant findings as noted above. All other systems negative. OBJECTIVE:    PHYSICAL EXAM:  Vitals:    09/09/21 1545 09/09/21 1825 09/09/21 2210 09/10/21 0235   BP: 129/75 114/69 (!) 167/89 (!) 152/76   Pulse: 65 62 62 61   Resp: 16 16 16 16   Temp: 97.8 °F (36.6 °C) 97.7 °F (36.5 °C) 97.6 °F (36.4 °C) 97.6 °F (36.4 °C)   TempSrc: Oral Oral Oral Oral   SpO2: 99% 97% 99% 98%   Weight:       Height:           General appearance: alert, resting in bed  Neuro: A&Ox3, no focal deficits  HENT: trachea midline, no JVD, no LAD  Eyes: PERRLA, no scleral icterus  Chest/Lungs: normal respiratory effort on RA  Cardiovascular: RRR, well perfused  Abdomen: soft, non-tender, non-distended, no guarding/rigidity  Skin: warm and dry  Extremities: no edema , no cyanosis  STRESS test 9/9/21:  Conclusions        Summary    There is no evidence for significant reversible or fixed perfusion defects    to suggest the presence of myocardial ischemia or scar.    The LVEF is 53% with normal LV wall motion.        This is a low risk cardiac scan. ASSESSMENT & PLAN:   Yvette Hunt is a 73 y.o. male with hx of CVA, s/p R carotid endarterectomy, HTN, acute pancreatitis (2 weeks ago), colon cancer s/p laparoscopic converted to open R hemicolectomy (8/24/20), for whom general surgery is consulted for diarrhea and suspicious findings on CT AP which include gastric pneumatosis with possible portal vein air and inflammatory vs neoplastic changes in pancreas.     - f/u EGD    - cont NPO  - cont IV PPI BID  - cont IV Abx  - general surgery will continue to follow closely    INÉS Jara  Resident Support Staff  [phone]    Attending Supervising Physician's Attestation Statement  I performed a history and physical examination on the patient and discussed the management with the nurse practitioner. I reviewed and agree with the findings and plan as documented in her note . CC: Portal venous gas, pneumaotis  No abd pain. Toelrating clears. Asking for food, appetite present.   Vitals:    09/10/21 0700   BP: (!) 145/76   Pulse: 61   Resp: 16   Temp: 98.1 °F (36.7 °C)   SpO2: 97%     Abd soft, nontender, nondsitended  CBC, BMP reviewed  Stress test reviewed    EGD today with GI    No evidence of ischemia or intra-abd catastrophe  Likely benign pneumatosis from smoking/COPD  Will follow    Electronically signed by Stormy Tovar MD on 9/10/21 at 12:49 PM EDT

## 2021-09-10 NOTE — ANESTHESIA POSTPROCEDURE EVALUATION
Department of Anesthesiology  Postprocedure Note    Patient: Lorna Carlos  MRN: [id number]  YOB: 1950  Date of evaluation: 9/10/2021  Time:  4:32 PM     Procedure Summary     Date: 09/10/21 Room / Location: [facility]    Anesthesia Start: 1504 Anesthesia Stop: 1520    Procedure: EGD BIOPSY (N/A ) Diagnosis:       Gastritis, presence of bleeding unspecified, unspecified chronicity, unspecified gastritis type      (GASTRITIS)    Surgeons: Nathaly Aguirre MD Responsible Provider: Sierra Doran DO    Anesthesia Type: MAC ASA Status: 3          Anesthesia Type: MAC    Guru Phase I: Guru Score: 10    Guru Phase II: Guru Score: 7    Last vitals: Reviewed and per EMR flowsheets.        Anesthesia Post Evaluation    Patient location during evaluation: PACU  Patient participation: complete - patient participated  Level of consciousness: awake  Pain score: 0  Airway patency: patent  Nausea & Vomiting: no nausea and no vomiting  Complications: no  Cardiovascular status: blood pressure returned to baseline  Respiratory status: acceptable

## 2021-09-10 NOTE — PROGRESS NOTES
Hospitalist Progress Note      PCP: Marcey Snellen, MD    Date of Admission: 9/5/2021    Chief Complaint: Confusion, right arm weakness    Hospital Course: Admitted for right-sided weakness with concern to TIA/stroke. MRI/MRA head negative for acute stroke. 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 Patient with diarrhea. C. difficile negative. GI PCR negative. CT abdomen showed gastric pneumatosis with possible portal vein air, enteritis with nonspecific stranding/nodularity of the pancreas. General surgery consulted, recommending EGD. GI consulted, plan for EGD today    Subjective: Patient states his abdominal cramping and diarrhea has improved. Is n.p.o. and feeling hungry.   Awaiting EGD later today    Medications:  Reviewed    Infusion Medications    sodium chloride 100 mL/hr at 09/09/21 0048    dextrose      sodium chloride 25 mL (09/08/21 1725)     Scheduled Medications    potassium chloride  10 mEq IntraVENous Q1H    magnesium oxide  400 mg Oral BID    sodium chloride  1,000 mL IntraVENous Once    pantoprazole  40 mg IntraVENous BID    atorvastatin  80 mg Oral Daily    aspirin  81 mg Oral Daily    insulin lispro  0-12 Units SubCUTAneous TID WC    insulin lispro  0-6 Units SubCUTAneous Nightly    clopidogrel  75 mg Oral Daily    DULoxetine  60 mg Oral Daily    sodium chloride flush  5-40 mL IntraVENous 2 times per day    enoxaparin  40 mg SubCUTAneous Daily    budesonide  0.25 mg Nebulization BID    And    Arformoterol Tartrate  15 mcg Nebulization BID    cholestyramine light  4 g Oral BID     PRN Meds: glucose, dextrose, glucagon (rDNA), dextrose, sodium chloride flush, sodium chloride, ondansetron **OR** ondansetron, polyethylene glycol, perflutren lipid microspheres, loperamide      Intake/Output Summary (Last 24 hours) at 9/10/2021 5410  Last data filed at 9/10/2021 0643  Gross per 24 hour   Intake 240 ml   Output 300 ml   Net -60 ml       Physical Exam Performed:    BP (!) 145/76   Pulse 61   Temp 98.1 °F (36.7 °C) (Oral)   Resp 16   Ht 5' 11\" (1.803 m)   Wt 220 lb (99.8 kg)   SpO2 97%   BMI 30.68 kg/m²     General appearance: No apparent distress, appears stated age and cooperative. HEENT: Pupils equal, round, and reactive to light. Conjunctivae/corneas clear. Neck: Supple, with full range of motion. No jugular venous distention. Trachea midline. Respiratory:  Normal respiratory effort. Clear to auscultation, bilaterally without Rales/Wheezes/Rhonchi. Cardiovascular: Regular rate and rhythm with normal S1/S2 without murmurs, rubs or gallops. Abdomen: Soft, non-tender, non-distended with normal bowel sounds. Musculoskeletal: No clubbing, cyanosis or edema bilaterally. Full range of motion without deformity. Skin: Skin color, texture, turgor normal.  No rashes or lesions. Neurologic:  Neurovascularly intact without any focal sensory/motor deficits. Cranial nerves: II-XII intact, grossly non-focal.  Psychiatric: Alert and oriented, thought content appropriate, normal insight  Capillary Refill: Brisk,< 3 seconds   Peripheral Pulses: +2 palpable, equal bilaterally       Labs:   Recent Labs     09/08/21  0601 09/09/21  0457   WBC 6.8 5.8   HGB 13.5 12.1*   HCT 38.7* 34.6*    154     Recent Labs     09/08/21  0601 09/09/21  0456 09/10/21  0904    138 141   K 3.5 3.5 3.4*    107 108   CO2 24 22 24   BUN 21* 14 8   CREATININE 1.3 1.1 1.0   CALCIUM 8.3 8.0* 8.2*   PHOS 2.6 2.7  --      No results for input(s): AST, ALT, BILIDIR, BILITOT, ALKPHOS in the last 72 hours. No results for input(s): INR in the last 72 hours. No results for input(s): Johnny Hug in the last 72 hours.     Urinalysis:      Lab Results   Component Value Date    NITRU Negative 10/16/2020    WBCUA None seen 10/16/2020    BACTERIA 1+ 02/22/2012    RBCUA  10/16/2020    BLOODU LARGE 10/16/2020    SPECGRAV 1.010 10/16/2020    GLUCOSEU 100 10/16/2020    GLUCOSEU NEGATIVE 02/22/2012       Radiology:  NM MYOCARDIAL SPECT REST EXERCISE OR RX   Final Result      CT ABDOMEN PELVIS W IV CONTRAST Additional Contrast? None   Final Result   1. Prominent air in the gastric wall with possible portal venous air or    subtle extraluminal air. 2.  Fluid in the small and large bowel, can be seen with enteritis or    diarrheal disease. 3.  Nonspecific stranding/nodularity inferior to the pancreas. Differential considerations include inflammatory/infectious or neoplastic    process. 4.  Additional findings, as above. MRA NECK WO CONTRAST   Final Result      1. No acute intracranial infarct. Chronic left frontal lobe infarct. 2.  Chronic complete occlusion of the left internal carotid artery at its origin, also present on CTA dated 2/23/2012 and on report from outside hospital CTA dated 8/7/2020, with faint reconstitution at its terminus via the [place]. Decreased    faint opacification of the left middle cerebral artery is new compared to 2012. Recommend neurology and vascular surgery consultation. Findings were discussed with the patient's nurse Eve Comer by Dr. Dale Greene on 9/5/2021 at 1020 hours. MRI brain without contrast   Final Result      1. No acute intracranial infarct. Chronic left frontal lobe infarct. 2.  Chronic complete occlusion of the left internal carotid artery at its origin, also present on CTA dated 2/23/2012 and on report from outside hospital CTA dated 8/7/2020, with faint reconstitution at its terminus via the [place]. Decreased    faint opacification of the left middle cerebral artery is new compared to 2012. Recommend neurology and vascular surgery consultation. Findings were discussed with the patient's nurse Eve Comer by Dr. Dale Greene on 9/5/2021 at 1020 hours. MRA HEAD WO CONTRAST   Final Result      1. No acute intracranial infarct. Chronic left frontal lobe infarct. 2.  Chronic complete occlusion of the left internal carotid artery at its origin, also present on CTA dated 2/23/2012 and on report from outside hospital CTA dated 8/7/2020, with faint reconstitution at its terminus via the [place]. Decreased    faint opacification of the left middle cerebral artery is new compared to 2012. Recommend neurology and vascular surgery consultation. Findings were discussed with the patient's nurse Colin Fernando by Dr. Lorena Snyder on 9/5/2021 at 1020 hours. 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 Chronic left frontal lobe infarct. Chronic complete occlusion of the left internal carotid artery at its origin present since 2012 with faint reconstitution at its terminus via the [place]. Decreased faint opacification of the left middle cerebral artery. Neurosurgery consulted  Continue telemetry  Hemoglobin A1c 6.7  Lipid panel with total cholesterol 95, LDL 45, HDL 26  Echo with an EF of 40 to 45%. No PFO  Continue aspirin, Plavix   Continue statin, dose increased per neurology  Maintain normotension, avoid hypotension  Long-term cardiac monitoritoring to assess for A. Fib  Cardiology consulted, recommended 30-day event monitor on discharge  Neurology signed off, appreciate recommendation    #Syncope  Patient had a rapid response called earlier this morning for syncopal episode  Patient was noted to be hypotensive and tachycardic. Received 1 L of IV fluids.   Was likely secondary to hypotension  Continue telemetry  Echo showed an EF of 40 to 45% with hypokinesis of septal, inferior and anteroseptal walls  Nuclear stress test showed no evidence for significant reversible or fixed perfusion defects to suggest the presence of myocardial ischemia or scar. The LVEF is 53% with normal LV wall motion. This is a low risk cardiac scan. Cardiology following    #Diarrhea   Hx of c diff diarrhea treated recently  C. difficile negative. GI PCR negative  CT abdomen was suggestive of gastric pneumatosis with possible portal vein air, enteritis or diarrheal disease with nonspecific stranding/nodularity inferior to the pancreas suggestive of inflammatory/infectious or neoplastic process  Patient does not want to take Questran  Continue as needed Imodium  General surgery following, recommending EGD by GI  GI consulted, plan for EGD today      #Leukocytosis likely reactive-resolved  Chest x-ray without any consolidation. UA negative. We will continue to monitor     #Diabetes mellitus type 2  HbA1c in July 6.7%  On Metformin at home  Monitor blood glucose  Continue carb controlled diet with sliding scale insulin     #CKD stage III  Creatinine 1.5 appears to be baseline  Monitor creatinine, stable     #Colon cancer status post colectomy   Currently on clinical trial at [facility].     #Active tobacco abuse  Still smokes half pack per day. Counseled guarding smoking cessation.     Patient reported that he had tried nicotine patch and Chantix did not help.     DVT Prophylaxis: lovenox  Diet: Diet NPO  Code Status: Full Code    Dispo -pending EGD, GI/surgery Andrews Breen MD

## 2021-09-10 NOTE — BRIEF OP NOTE
Brief Postoperative Note      Patient: Allyn Pederson  YOB: 1950  MRN: [id number]    Date of Procedure: 9/10/2021    Pre-Op Diagnosis: GASTRITIS    Post-Op Diagnosis: Same       Procedure(s):  EGD BIOPSY    Surgeon(s):  Christian Edwards MD    Assistant:  * No surgical staff found *    Anesthesia: Monitor Anesthesia Care    Estimated Blood Loss (mL): Minimal    Complications: None    Specimens:   ID Type Source Tests Collected by Time Destination   A : gastric bx - gastritis R/O HP Gastric Gastric SURGICAL PATHOLOGY Christian Edwards MD 9/10/2021 1515        Implants:  * No implants in log *      Drains: * No LDAs found *    Findings: 1. Gastritis resume diet and MTN Pantoprazole 40mg QD. Path pending. No ulcerations or bleeing. May continue plavix etc mtn PPI QD. Imodium PRN for post Colectomy diarrhea. See primary GI as outpatient.     Electronically signed by Florence Rodriguez MD on 9/10/2021 at 3:21 PM

## 2021-09-10 NOTE — PROGRESS NOTES
Return from EGD , continues to have no pain NIH 0 , tolerated regular diet without difficulty, up to bathroom with stand by assist , talked with nurse for endoscopy and was told that the Asprin , Plavix Lovenox , could be restarted tomorrow

## 2021-09-10 NOTE — PLAN OF CARE
Problem: Falls - Risk of:  Goal: Will remain free from falls  Description: Will remain free from falls  Outcome: Ongoing  Patient remains free from falls during this shift. Patient is up x1 person stand by assist. Bed is in the lowest position and the bed alarm is activated. Anti-slip socks are on. Call light is within reach. Will continue to monitor and reassess. Problem: HEMODYNAMIC STATUS  Goal: Patient has stable vital signs and fluid balance  Outcome: Ongoing   Vital signs are stable. Patient tolerates PO fluids. Adequate urine output. NPO since midnight. Will continue to monitor and reassess.

## 2021-09-11 VITALS
TEMPERATURE: 98 F | WEIGHT: 220 LBS | OXYGEN SATURATION: 96 % | HEART RATE: 55 BPM | DIASTOLIC BLOOD PRESSURE: 78 MMHG | HEIGHT: 71 IN | BODY MASS INDEX: 30.8 KG/M2 | SYSTOLIC BLOOD PRESSURE: 143 MMHG | RESPIRATION RATE: 16 BRPM

## 2021-09-11 LAB
ALBUMIN SERPL-MCNC: 3.3 G/DL (ref 3.4–5)
ANION GAP SERPL CALCULATED.3IONS-SCNC: 8 MMOL/L (ref 3–16)
BASOPHILS ABSOLUTE: 0.1 K/UL (ref 0–0.2)
BASOPHILS RELATIVE PERCENT: 0.9 %
BUN BLDV-MCNC: 7 MG/DL (ref 7–20)
CALCIUM SERPL-MCNC: 8.1 MG/DL (ref 8.3–10.6)
CHLORIDE BLD-SCNC: 107 MMOL/L (ref 99–110)
CO2: 24 MMOL/L (ref 21–32)
CREAT SERPL-MCNC: 1 MG/DL (ref 0.8–1.3)
EOSINOPHILS ABSOLUTE: 0.3 K/UL (ref 0–0.6)
EOSINOPHILS RELATIVE PERCENT: 4.6 %
GFR AFRICAN AMERICAN: >60
GFR NON-AFRICAN AMERICAN: >60
GLUCOSE BLD-MCNC: 119 MG/DL (ref 70–99)
GLUCOSE BLD-MCNC: 142 MG/DL (ref 70–99)
HCT VFR BLD CALC: 31.8 % (ref 40.5–52.5)
HEMOGLOBIN: 11.3 G/DL (ref 13.5–17.5)
LYMPHOCYTES ABSOLUTE: 1.8 K/UL (ref 1–5.1)
LYMPHOCYTES RELATIVE PERCENT: 32.3 %
MAGNESIUM: 1.9 MG/DL (ref 1.8–2.4)
MCH RBC QN AUTO: 31.2 PG (ref 26–34)
MCHC RBC AUTO-ENTMCNC: 35.5 G/DL (ref 31–36)
MCV RBC AUTO: 87.8 FL (ref 80–100)
MONOCYTES ABSOLUTE: 0.7 K/UL (ref 0–1.3)
MONOCYTES RELATIVE PERCENT: 11.8 %
NEUTROPHILS ABSOLUTE: 2.8 K/UL (ref 1.7–7.7)
NEUTROPHILS RELATIVE PERCENT: 50.4 %
PDW BLD-RTO: 13.7 % (ref 12.4–15.4)
PERFORMED ON: ABNORMAL
PHOSPHORUS: 2.9 MG/DL (ref 2.5–4.9)
PLATELET # BLD: 160 K/UL (ref 135–450)
PMV BLD AUTO: 10.4 FL (ref 5–10.5)
POTASSIUM SERPL-SCNC: 3.5 MMOL/L (ref 3.5–5.1)
RBC # BLD: 3.62 M/UL (ref 4.2–5.9)
SODIUM BLD-SCNC: 139 MMOL/L (ref 136–145)
WBC # BLD: 5.6 K/UL (ref 4–11)

## 2021-09-11 PROCEDURE — 6370000000 HC RX 637 (ALT 250 FOR IP): Performed by: NURSE PRACTITIONER

## 2021-09-11 PROCEDURE — 6370000000 HC RX 637 (ALT 250 FOR IP): Performed by: INTERNAL MEDICINE

## 2021-09-11 PROCEDURE — 6360000002 HC RX W HCPCS: Performed by: STUDENT IN AN ORGANIZED HEALTH CARE EDUCATION/TRAINING PROGRAM

## 2021-09-11 PROCEDURE — 99231 SBSQ HOSP IP/OBS SF/LOW 25: CPT | Performed by: SURGERY

## 2021-09-11 PROCEDURE — 2580000003 HC RX 258: Performed by: SURGERY

## 2021-09-11 PROCEDURE — 6360000002 HC RX W HCPCS: Performed by: INTERNAL MEDICINE

## 2021-09-11 PROCEDURE — 36415 COLL VENOUS BLD VENIPUNCTURE: CPT

## 2021-09-11 PROCEDURE — 2580000003 HC RX 258: Performed by: INTERNAL MEDICINE

## 2021-09-11 PROCEDURE — 80069 RENAL FUNCTION PANEL: CPT

## 2021-09-11 PROCEDURE — 83735 ASSAY OF MAGNESIUM: CPT

## 2021-09-11 PROCEDURE — 6370000000 HC RX 637 (ALT 250 FOR IP): Performed by: STUDENT IN AN ORGANIZED HEALTH CARE EDUCATION/TRAINING PROGRAM

## 2021-09-11 PROCEDURE — 85025 COMPLETE CBC W/AUTO DIFF WBC: CPT

## 2021-09-11 PROCEDURE — C9113 INJ PANTOPRAZOLE SODIUM, VIA: HCPCS | Performed by: INTERNAL MEDICINE

## 2021-09-11 RX ORDER — MAGNESIUM SULFATE IN WATER 40 MG/ML
2000 INJECTION, SOLUTION INTRAVENOUS ONCE
Status: COMPLETED | OUTPATIENT
Start: 2021-09-11 | End: 2021-09-11

## 2021-09-11 RX ORDER — LOPERAMIDE HYDROCHLORIDE 2 MG/1
2 CAPSULE ORAL 4 TIMES DAILY PRN
Qty: 30 CAPSULE | Refills: 0 | Status: SHIPPED | OUTPATIENT
Start: 2021-09-11 | End: 2021-09-21

## 2021-09-11 RX ORDER — PANTOPRAZOLE SODIUM 40 MG/1
40 TABLET, DELAYED RELEASE ORAL 2 TIMES DAILY
Qty: 30 TABLET | Refills: 3 | Status: SHIPPED | OUTPATIENT
Start: 2021-09-11

## 2021-09-11 RX ADMIN — CLOPIDOGREL BISULFATE 75 MG: 75 TABLET ORAL at 08:34

## 2021-09-11 RX ADMIN — Medication 400 MG: at 08:34

## 2021-09-11 RX ADMIN — SODIUM CHLORIDE: 9 INJECTION, SOLUTION INTRAVENOUS at 04:46

## 2021-09-11 RX ADMIN — MAGNESIUM SULFATE HEPTAHYDRATE 2000 MG: 2 INJECTION, SOLUTION INTRAVENOUS at 08:35

## 2021-09-11 RX ADMIN — DULOXETINE HYDROCHLORIDE 60 MG: 60 CAPSULE, DELAYED RELEASE ORAL at 08:34

## 2021-09-11 RX ADMIN — Medication 10 ML: at 08:43

## 2021-09-11 RX ADMIN — POTASSIUM PHOSPHATE, MONOBASIC 500 MG: 500 TABLET, SOLUBLE ORAL at 08:34

## 2021-09-11 RX ADMIN — ENOXAPARIN SODIUM 40 MG: 40 INJECTION SUBCUTANEOUS at 08:34

## 2021-09-11 RX ADMIN — CHOLESTYRAMINE 4 G: 4 POWDER, FOR SUSPENSION ORAL at 08:34

## 2021-09-11 RX ADMIN — ASPIRIN 81 MG: 81 TABLET, COATED ORAL at 08:34

## 2021-09-11 RX ADMIN — PANTOPRAZOLE SODIUM 40 MG: 40 INJECTION, POWDER, FOR SOLUTION INTRAVENOUS at 08:34

## 2021-09-11 RX ADMIN — ATORVASTATIN CALCIUM 80 MG: 80 TABLET, FILM COATED ORAL at 08:34

## 2021-09-11 NOTE — PLAN OF CARE
Problem: Falls - Risk of:  Goal: Will remain free from falls  Description: Will remain free from falls  Outcome: Ongoing  Note: Patient has called appropriately for needs. Bed is in low and locked position. Bed alarm set. Call light and bedside table within reach. He has remained free from falls. Problem: Pain:  Description: Pain management should include both nonpharmacologic and pharmacologic interventions. Goal: Control of acute pain  Description: Control of acute pain  Outcome: Ongoing  Note: Patient denies further abdominal discomfort or diarrhea. Instructed to call with any discomfort.

## 2021-09-11 NOTE — PROGRESS NOTES
Patient has been resting quietly in bed. Vital signs stable. Patient denies pain, weakness or decreased sensation to extremities. Abdomen soft and rounded with active bowel sounds. He denies abdominal pain. Instructed to call for any needs. Call light within reach.

## 2021-09-11 NOTE — PROGRESS NOTES
Surgery Daily Progress Note      CC: emphysematous gastritis; portal venous air    SUBJECTIVE:  Patient rested well overnight. Tolerated a general diet for dinner. Patient denies any nausea or vomiting. EGD completed with GI yesterday with gastritis noted. ROS:   A 14 point review of systems was conducted, significant findings as noted above. All other systems negative. OBJECTIVE:    PHYSICAL EXAM:  Vitals:    09/10/21 1649 09/10/21 1915 09/10/21 2200 09/11/21 0331   BP: (!) 155/80 (!) 147/77  (!) 143/74   Pulse:  61     Resp: 18 18 18 26   Temp: 97.6 °F (36.4 °C) 98.2 °F (36.8 °C)  97.9 °F (36.6 °C)   TempSrc: Oral Oral Oral Oral   SpO2:  98% 96%    Weight:       Height:           General appearance: alert, resting in bed  Neuro: A&Ox3, no focal deficits  HENT: trachea midline, no JVD, no LAD  Eyes: PERRLA, no scleral icterus  Chest/Lungs: normal respiratory effort on RA  Cardiovascular: RRR, well perfused  Abdomen: soft, non-tender, non-distended, no guarding/rigidity  Skin: warm and dry  Extremities: no edema , no cyanosis    STRESS test 9/9/21:  Conclusions        Summary    There is no evidence for significant reversible or fixed perfusion defects    to suggest the presence of myocardial ischemia or scar.    The LVEF is 53% with normal LV wall motion.        This is a low risk cardiac scan. ASSESSMENT & PLAN:   Cesar Litten is a 73 y.o. male with hx of CVA, s/p R carotid endarterectomy, HTN, acute pancreatitis (2 weeks ago), colon cancer s/p laparoscopic converted to open R hemicolectomy (8/24/20), for whom general surgery is consulted for diarrhea and suspicious findings on CT AP which include gastric pneumatosis with possible portal vein air and inflammatory vs neoplastic changes in pancreas. - EGD completed 9/10 with evidence of gastritis but no bleeding and no ulcerations. - Continue general diet  - cont IV PPI BID  - cont IV Abx  -Okay to discharge home from a surgery standpoint. Stefania Herr DO, MS  PGY1, General Surgery  09/11/21  7:29 AM    I am post-call today and will not be on campus. Please contact Dr. Jumana Hope at [phone] for questions or concerns regarding this patient. I have personally performed the medical history, physical exam and medical decision making and agree with all pertinent clinical information unless otherwise noted.      Flynn Kumar MD  Surgery Attending

## 2021-09-11 NOTE — CARE COORDINATION
Case Management Assessment            Discharge Note                    Date / Time of Note: 9/11/2021 10:30 AM                  Discharge Note Completed by: Tristen Dominguez RN    Patient Name: Nisha Baird   YOB: 1950  Diagnosis: TIA (transient ischemic attack) [G45.9]  Right arm weakness [R29.898]   Date / Time: 9/5/2021  8:55 AM    Current PCP: Cristino Russell MD  Clinic patient: No    Hospitalization in the last 30 days: No    Advance Directives:  Code Status: Full Code  [place] DNR form completed and on chart: No    Financial:  Payor: Deshaun Chaney / Plan: Yoly Michaels ESSENTIAL/PLUS / Product Type: *No Product type* /      Pharmacy:    [facility]/pharmacy [address] [facility], [facility]   [address] [address]  Phone: [phone] Fax: [phone]      Assistance purchasing medications?: Potential Assistance Purchasing Medications: No  Assistance provided by Case Management: None at this time    Does patient want to participate in local refill/ meds to beds program?: Not Assessed    Meds To Beds General Rules:  1. Can ONLY be done Monday- Friday between 8:30am-5pm  2. Prescription(s) must be in pharmacy by 3pm to be filled same day  3. Copy of patient's insurance/ prescription drug card and patient face sheet must be sent along with the prescription(s)  4. Cost of Rx cannot be added to hospital bill. If financial assistance is needed, please contact unit  or ;  or  CANNOT provide pharmacy voucher for patients co-pays  5.  Patients can then  the prescription on their way out of the hospital at discharge, or pharmacy can deliver to the bedside if staff is available. (payment due at time of pick-up or delivery - cash, check, or card accepted)     Able to afford home medications/ co-pay costs: Yes    ADLS:  Current PT AM-PAC Score: 24 /24  Current OT AM-PAC Score: /24      DISCHARGE Disposition: Home- No Services Needed    LOC at discharge: Not Applicable  DELFINO Completed: Not Indicated    Notification completed in HENS/PAS?:  Not Applicable    IMM Completed:   Not Indicated    Transportation:  Transportation PLAN for discharge: family   Mode of Transport: Private Car  Reason for medical transport: Not Applicable  Name of Transport Company: Not Applicable  Time of Transport: when family available    Transport form completed: No    Home Care:  [address] ordered at discharge: No  [address]: Not Applicable  Orders faxed: No    Durable Medical Equipment:  DME Provider: NA  Equipment obtained during hospitalization: NA    Home Oxygen and Respiratory Equipment:  Oxygen needed at discharge?: No  [address]: Not Applicable  Portable tank available for discharge?: Not Indicated    Dialysis:  Dialysis patient: No    Dialysis Center:  Not Applicable    Hospice Services:  Location: Not Applicable  Agency: Not Applicable    Consents signed: Not Indicated    Referrals made at [facility] for outpatient continued care:  Not Applicable    Additional CM Notes:     Patient is from home with spouse and daughter. Independent at baseline. Therapy cleared patient on 9/6 for home. New Rx:  E scribed to his own Pharmacy      these medications from [address] [place] [address] [phone]  1 loperamide  2 pantoprazole    Family to  transport : will follow up out pt  As instructed  .         The Plan for Transition of Care is related to the following treatment goals of TIA (transient ischemic attack) [G45.9]  Right arm weakness [R29.898]    The Patient and/or patient representative Kyleigh Lopez and his family were provided with a choice of provider and agrees with the discharge plan Yes    Freedom of choice list was provided with basic dialogue that supports the patient's individualized plan of care/goals and shares the quality data associated with the providers.  Yes    Care Transitions patient: No    Georgette Sandy, RN  [facility]  Case Management Department  Ph: [phone]

## 2021-09-11 NOTE — DISCHARGE SUMMARY
Hospital Medicine Discharge Summary    Patient ID: Nemo Perry      Patient's PCP: Claudia Rodriguez MD    Admit Date: 9/5/2021     Discharge Date:  09/11/21    Admitting Physician: Katelyn Coto MD     Discharge Physician: Kobe Mcguire MD     Discharge Diagnoses: Active Hospital Problems    Diagnosis Date Noted    Disorder of portal venous system [I87.9]     Right arm weakness [R29.898] 09/07/2021    TIA (transient ischemic attack) [G45.9] 09/05/2021       The patient was seen and examined on day of discharge and this discharge summary is in conjunction with any daily progress note from day of discharge. 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 Neurochecks were done. Patient was placed on telemetry. Hemoglobin A1c/lipid panel/echo/MRI and MRA was ordered. Neurology was consulted. Patient was continued on aspirin and Plavix and statin. Hemoglobin A1c was 6.7, lipid panel showed total cholesterol 95, LDL 45, HDL 26.  Echo showed an EF of 40 to 45% with no PFO. MRI/MRA showed no acute intracranial infarct. Chronic left frontal lobe infarct. Chronic complete occlusion of the left internal carotid artery at its origin present since 2012 with faint reconstitution at its terminus via the [place]. Decreased faint opacification of the left middle cerebral artery. Neurosurgery was consulted but did not see the patient and recommended patient follow-up with his primary vascular surgeon. . Statin dose was increased per neurology. Neurology recommended maintaining normotension and avoiding hypotension. Long-term cardiac monitor to assess for A. fib was recommended. Cardiology NP informed for 30-day cardiac monitor.   Patient is being discharged in stable condition with recommendation to follow-up with primary vascular surgeon.     #Syncope  Patient had a rapid response called 09/07 morning for syncopal episode. Patient was noted to be hypotensive and tachycardic. Received 1 L of IV fluids. Was likely secondary to hypotension. Cardiology was consulted. Was continued on telemetry. Echo showed an EF of 40 to 45% with hypokinesis of septal, inferior and anteroseptal walls. Stress test was ordered. Nuclear stress test showed no evidence for significant reversible or fixed perfusion defects to suggest the presence of myocardial ischemia or scar. The LVEF is 53% with normal LV wall motion. This is a low risk cardiac scan. Cardiology recommended outpatient follow-up     #Diarrhea   Patient had history of recently treated c diff diarrhea. C. difficile and GI PCR was sent. Patient was given as needed Imodium. C. difficile and GI PCR was negative. CT abdomen was done and was suggestive of gastric pneumatosis with possible portal vein air, enteritis or diarrheal disease with nonspecific stranding/nodularity inferior to the pancreas suggestive of inflammatory/infectious or neoplastic process. Given patient history of colon cancer and ongoing diarrhea. General surgery/GI was consulted. Surgery recommended patient undergo EGD. Patient had EGD which showed gastritis. GI recommended continuing PPI. Patient's diarrhea improved. Patient is being discharged in stable condition with recommendation to follow-up with GI.      #Leukocytosis likely reactive-resolved  Chest x-ray without any consolidation.  UA negative. Leukocytosis resolved    #Diabetes mellitus type 2  HbA1c in July 6.7%. Patient was on Metformin at home. Metformin was held. Blood glucose was monitored. Patient was continued on carb controlled diet and sliding scale insulin. Blood glucose remained within acceptable range.   Patient to resume Metformin on discharge.     #CKD stage III  Creatinine 1.5 appears to be baseline. Creatinine was monitored and remained improved to 1.0 on discharge     #Colon cancer status post colectomy   Currently on clinical trial at [facility].     #Active tobacco abuse  Patient reported that he had tried nicotine patch and Chantix did not help. Still smokes half pack per day. Counseled regarding smoking cessation.        Physical Exam Performed:     BP (!) 148/80   Pulse 55   Temp 98 °F (36.7 °C) (Oral)   Resp 16   Ht 5' 11\" (1.803 m)   Wt 220 lb (99.8 kg)   SpO2 96%   BMI 30.68 kg/m²       General appearance:  No apparent distress, appears stated age and cooperative. HEENT:  Normal cephalic, atraumatic without obvious deformity. Pupils equal, round, and reactive to light. Extra ocular muscles intact. Conjunctivae/corneas clear. Neck: Supple, with full range of motion. No jugular venous distention. Trachea midline. Respiratory:  Normal respiratory effort. Clear to auscultation, bilaterally without Rales/Wheezes/Rhonchi. Cardiovascular:  Regular rate and rhythm with normal S1/S2 without murmurs, rubs or gallops. Abdomen: Soft, non-tender, non-distended with normal bowel sounds. Musculoskeletal:  No clubbing, cyanosis or edema bilaterally. Full range of motion without deformity. Skin: Skin color, texture, turgor normal.  No rashes or lesions. Neurologic:  Neurovascularly intact without any focal sensory/motor deficits. Cranial nerves: II-XII intact, grossly non-focal.  Psychiatric:  Alert and oriented, thought content appropriate, normal insight  Capillary Refill: Brisk,< 3 seconds   Peripheral Pulses: +2 palpable, equal bilaterally       Labs:  For convenience and continuity at follow-up the following most recent labs are provided:      CBC:    Lab Results   Component Value Date    WBC 5.6 09/11/2021    HGB 11.3 09/11/2021    HCT 31.8 09/11/2021     09/11/2021       Renal:    Lab Results   Component Value Date     09/11/2021    K 3.5 09/11/2021    K 3.4 09/10/2021     09/11/2021    CO2 24 09/11/2021    BUN 7 09/11/2021    CREATININE 1.0 09/11/2021    CALCIUM 8.1 09/11/2021    PHOS 2.9 09/11/2021         Significant Diagnostic Studies    Radiology:   NM MYOCARDIAL SPECT REST EXERCISE OR RX   Final Result      CT ABDOMEN PELVIS W IV CONTRAST Additional Contrast? None   Final Result   1. Prominent air in the gastric wall with possible portal venous air or    subtle extraluminal air. 2.  Fluid in the small and large bowel, can be seen with enteritis or    diarrheal disease. 3.  Nonspecific stranding/nodularity inferior to the pancreas. Differential considerations include inflammatory/infectious or neoplastic    process. 4.  Additional findings, as above. MRA NECK WO CONTRAST   Final Result      1. No acute intracranial infarct. Chronic left frontal lobe infarct. 2.  Chronic complete occlusion of the left internal carotid artery at its origin, also present on CTA dated 2/23/2012 and on report from outside hospital CTA dated 8/7/2020, with faint reconstitution at its terminus via the [place]. Decreased    faint opacification of the left middle cerebral artery is new compared to 2012. Recommend neurology and vascular surgery consultation. Findings were discussed with the patient's nurse Dorsey List by Dr. Rocio Granda on 9/5/2021 at 1020 hours. MRI brain without contrast   Final Result      1. No acute intracranial infarct. Chronic left frontal lobe infarct. 2.  Chronic complete occlusion of the left internal carotid artery at its origin, also present on CTA dated 2/23/2012 and on report from outside hospital CTA dated 8/7/2020, with faint reconstitution at its terminus via the [place] of [place]. Decreased    faint opacification of the left middle cerebral artery is new compared to 2012. Recommend neurology and vascular surgery consultation.       Findings were discussed with the patient's nurse Dorsey List by Dr. Rocio Granda on 9/5/2021 at 1020 hours. MRA HEAD WO CONTRAST   Final Result      1. No acute intracranial infarct. Chronic left frontal lobe infarct. 2.  Chronic complete occlusion of the left internal carotid artery at its origin, also present on CTA dated 2/23/2012 and on report from outside hospital CTA dated 8/7/2020, with faint reconstitution at its terminus via the [place]. Decreased    faint opacification of the left middle cerebral artery is new compared to 2012. Recommend neurology and vascular surgery consultation. Findings were discussed with the patient's nurse Jacquie Boston by Dr. Jackie Meeks on 9/5/2021 at 1020 hours. Consults:     IP CONSULT TO NEUROLOGY  IP CONSULT TO CARDIOLOGY  IP CONSULT TO GI  IP CONSULT TO GENERAL SURGERY  IP CONSULT TO GI    Disposition: Home    Condition at Discharge: Stable    Discharge Instructions/Follow-up: Cardiology NP to arrange for cardiac monitor. Follow up with GI, Dr. Constance Kim  Follow up with vascular surgery  Follow up with PCP    Code Status:  Full Code     Activity: activity as tolerated    Diet: diabetic diet      Discharge Medications:     Current Discharge Medication List           Details   metFORMIN (GLUCOPHAGE) 500 MG tablet Take 500 mg by mouth 2 times daily (with meals)      ondansetron (ZOFRAN) 4 MG tablet Take 1 tablet by mouth every 8 hours as needed for Nausea  Qty: 20 tablet, Refills: 0      aspirin 81 MG EC tablet Take 81 mg by mouth      atorvastatin (LIPITOR) 40 MG tablet TAKE 1 TABLET BY MOUTH DAILY. clopidogrel (PLAVIX) 75 MG tablet TAKE 1 TAB BY MOUTH DAILY. DULoxetine (CYMBALTA) 60 MG extended release capsule TAKE ONE CAPSULE BY MOUTH EVERY DAY      Multiple Vitamins-Minerals (MULTIVITAMIN ADULT EXTRA C PO) Take by mouth      pantoprazole (PROTONIX) 40 MG tablet TAKE 1 TAB BY MOUTH DAILY. naproxen (NAPROSYN) 500 MG tablet Take 1 tablet by mouth 2 times daily.   Qty: 20 tablet, Refills: 0      tamsulosin (FLOMAX) 0.4 MG capsule Take 1 capsule by mouth daily for 5 doses  Qty: 5 capsule, Refills: 0      budesonide-formoterol (SYMBICORT) 80-4.5 MCG/ACT AERO Inhale 1 puff into the lungs             Time Spent on discharge is more than 30 minutes in the examination, evaluation, counseling and review of medications and discharge plan. Signed:    Gay Hodgkins, MD   9/11/2021      Thank you Jeremy Bonner MD for the opportunity to be involved in this patient's care. If you have any questions or concerns please feel free to contact me at [phone].

## 2021-09-11 NOTE — PLAN OF CARE
Problem: Falls - Risk of:  Goal: Will remain free from falls  Description: Will remain free from falls. 9/11/2021 0848 by Gay Mendez RN  Outcome: Ongoing      Problem: Pain:  Goal: Control of acute pain  Description: Control of acute pain  9/11/2021 0427 by Shirin Burt RN  Outcome: Ongoing  Note: Patient denies further abdominal discomfort or diarrhea. Instructed to call with any discomfort.

## 2021-09-11 NOTE — CONSULTS
[facility] [address], [address]                                  CONSULTATION    PATIENT NAME: Carrie Osman                  :        1950  MED REC NO:   [id number]                          ROOM:       5519  ACCOUNT NO:   [de-identified]                           ADMIT DATE: 2021  PROVIDER:     José Luis Laguna MD    CONSULT DATE:  09/10/2021    HISTORY OF PRESENT ILLNESS:  A 42-year-old male with history of colon  cancer, TIAs, diabetes, presented with onset of nausea, vomiting, and  diarrhea. The patient also had some acute mental status changes. He  was transferred to the hospital.  Neurological evaluation was completed. He was also evaluated for a coronary event. Stress test was negative. During his acute evaluation, he had a CT scan, which demonstrated  possible emphysematous gastritis. He has been followed by Surgery. He  did have some air in the portal venous system. No fever or white count. No history of gastroparesis. No dysphagia, hematemesis, or melena. No  prior history of liver disease or pancreatitis. Diarrhea has been an  issue intermittently since this is also common factor of colon cancer. No hematochezia. ALLERGIES:  None. HOME MEDICATIONS:  Glucophage, Zofran, Lipitor, Plavix, Cymbalta,  Protonix, Naprosyn, Flomax. PAST SURGICAL HISTORY:  Right hemicolectomy. SOCIAL HISTORY:  Positive for tobacco.  Occasional alcohol. FAMILY HISTORY:  Noncontributory. PHYSICAL EXAMINATION:  VITAL SIGNS:  Stable, afebrile. HEENT:  No conjunctival icterus. CHEST:  Clear. CARDIOVASCULAR:  Regular rate and rhythm. No murmur, gallop, or click. ABDOMEN:  Bowel sounds present. Soft, nontender, nondistended. No  masses. RECTAL:  No masses or gross blood. EXTREMITIES:  No clubbing or edema. SKIN:  No pallor. LABORATORY DATA:  White count 15,300, hemoglobin 14.8. BUN 13. Liver  tests normal.  CAT scan as above. Urinalysis, positive for blood in the  urine. IMPRESSION AND PLAN:  The patient is currently on proton pump inhibitor  and put on antibiotics. Being followed by Surgical Service. We will  proceed with diagnostic EGD if the Surgical Service requests at this  point in time.         Akila Pepper MD    D: 09/10/2021 15:04:37       T: 09/10/2021 23:42:11     [email or web address]  Job#: [id number]     Doc#: [id number]    CC:  Conchita Balderas MD

## 2021-09-13 ENCOUNTER — TELEPHONE (OUTPATIENT)
Dept: CARDIOLOGY CLINIC | Age: 71
End: 2021-09-13

## 2021-09-13 NOTE — TELEPHONE ENCOUNTER
Successfully registered patient for a medilynx monitor to be mailed to his house and I left him a message on his machine.    ----- Message from INÉS Menjivar CNP sent at 9/12/2021  8:08 AM EDT -----  Patient needs a 30 day MediLynx mailed to him.        [address]  Electrophysiology  MD Polly Valera MD Whitney Abisai Mariannes JULIANA Cabrera RN    9/12/21    Lorna Carlos, 1950    Type of Monitor: 30 day MediLynx    Diagnosis:TIA    Ordering Provider: FW    Report to go to : [facility]    F/U with EP/Cardiology?: yes after monitor

## 2021-09-17 ENCOUNTER — TELEPHONE (OUTPATIENT)
Dept: CARDIOLOGY CLINIC | Age: 71
End: 2021-09-17

## 2021-09-17 NOTE — TELEPHONE ENCOUNTER
[facility] reached out to them to cancel his monitor as he was told his heart was \"fine\" and he doesn't feel the need to wear it.

## 2021-09-28 ENCOUNTER — TELEPHONE (OUTPATIENT)
Dept: CARDIOLOGY CLINIC | Age: 71
End: 2021-09-28

## 2021-09-28 NOTE — TELEPHONE ENCOUNTER
Called and left patient a voice message to call me back so I can schedule him an appointment with Lorna Fang.

## 2024-02-01 NOTE — PROGRESS NOTES
PRN zofran administered for nausea. Pt did have several episodes of emesis, brown bile. (3) adequate

## 2024-08-18 ENCOUNTER — HOSPITAL ENCOUNTER (INPATIENT)
Age: 74
LOS: 4 days | Discharge: HOME | End: 2024-08-23
Attending: EMERGENCY MEDICINE | Admitting: INTERNAL MEDICINE
Payer: MEDICARE

## 2024-08-18 DIAGNOSIS — K85.90 ACUTE PANCREATITIS, UNSPECIFIED COMPLICATION STATUS, UNSPECIFIED PANCREATITIS TYPE: Primary | ICD-10-CM

## 2024-08-18 PROCEDURE — 99285 EMERGENCY DEPT VISIT HI MDM: CPT

## 2024-08-18 ASSESSMENT — LIFESTYLE VARIABLES
HOW OFTEN DO YOU HAVE A DRINK CONTAINING ALCOHOL: NEVER
HOW MANY STANDARD DRINKS CONTAINING ALCOHOL DO YOU HAVE ON A TYPICAL DAY: PATIENT DOES NOT DRINK

## 2024-08-18 ASSESSMENT — PAIN - FUNCTIONAL ASSESSMENT: PAIN_FUNCTIONAL_ASSESSMENT: NONE - DENIES PAIN

## 2024-08-19 ENCOUNTER — APPOINTMENT (OUTPATIENT)
Dept: CT IMAGING | Age: 74
End: 2024-08-19
Payer: MEDICARE

## 2024-08-19 PROBLEM — K85.90 ACUTE PANCREATITIS WITHOUT INFECTION OR NECROSIS: Status: ACTIVE | Noted: 2024-08-19

## 2024-08-19 LAB
ALBUMIN SERPL-MCNC: 3.7 G/DL (ref 3.4–5)
ALBUMIN SERPL-MCNC: 4.1 G/DL (ref 3.4–5)
ALBUMIN/GLOB SERPL: 1.3 {RATIO} (ref 1.1–2.2)
ALBUMIN/GLOB SERPL: 1.4 {RATIO} (ref 1.1–2.2)
ALP SERPL-CCNC: 110 U/L (ref 40–129)
ALP SERPL-CCNC: 121 U/L (ref 40–129)
ALT SERPL-CCNC: 136 U/L (ref 10–40)
ALT SERPL-CCNC: 181 U/L (ref 10–40)
ANION GAP SERPL CALCULATED.3IONS-SCNC: 12 MMOL/L (ref 3–16)
ANION GAP SERPL CALCULATED.3IONS-SCNC: 9 MMOL/L (ref 3–16)
ANISOCYTOSIS BLD QL SMEAR: ABNORMAL
AST SERPL-CCNC: 254 U/L (ref 15–37)
AST SERPL-CCNC: 288 U/L (ref 15–37)
BACTERIA URNS QL MICRO: ABNORMAL /HPF
BASOPHILS # BLD: 0 K/UL (ref 0–0.2)
BASOPHILS # BLD: 0 K/UL (ref 0–0.2)
BASOPHILS NFR BLD: 0 %
BASOPHILS NFR BLD: 0.2 %
BILIRUB SERPL-MCNC: 1.8 MG/DL (ref 0–1)
BILIRUB SERPL-MCNC: 2.9 MG/DL (ref 0–1)
BILIRUB UR QL STRIP.AUTO: ABNORMAL
BUN SERPL-MCNC: 13 MG/DL (ref 7–20)
BUN SERPL-MCNC: 18 MG/DL (ref 7–20)
CALCIUM SERPL-MCNC: 8.3 MG/DL (ref 8.3–10.6)
CALCIUM SERPL-MCNC: 9 MG/DL (ref 8.3–10.6)
CHLORIDE SERPL-SCNC: 105 MMOL/L (ref 99–110)
CHLORIDE SERPL-SCNC: 97 MMOL/L (ref 99–110)
CLARITY UR: CLEAR
CO2 SERPL-SCNC: 26 MMOL/L (ref 21–32)
CO2 SERPL-SCNC: 27 MMOL/L (ref 21–32)
COLOR UR: YELLOW
CREAT SERPL-MCNC: 1.1 MG/DL (ref 0.8–1.3)
CREAT SERPL-MCNC: 1.3 MG/DL (ref 0.8–1.3)
DEPRECATED RDW RBC AUTO: 13.5 % (ref 12.4–15.4)
DEPRECATED RDW RBC AUTO: 14 % (ref 12.4–15.4)
EOSINOPHIL # BLD: 0 K/UL (ref 0–0.6)
EOSINOPHIL # BLD: 0 K/UL (ref 0–0.6)
EOSINOPHIL NFR BLD: 0 %
EOSINOPHIL NFR BLD: 0 %
EPI CELLS #/AREA URNS HPF: ABNORMAL /HPF (ref 0–5)
GFR SERPLBLD CREATININE-BSD FMLA CKD-EPI: 58 ML/MIN/{1.73_M2}
GFR SERPLBLD CREATININE-BSD FMLA CKD-EPI: 71 ML/MIN/{1.73_M2}
GLUCOSE BLD-MCNC: 149 MG/DL (ref 70–99)
GLUCOSE BLD-MCNC: 161 MG/DL (ref 70–99)
GLUCOSE BLD-MCNC: 182 MG/DL (ref 70–99)
GLUCOSE BLD-MCNC: 198 MG/DL (ref 70–99)
GLUCOSE BLD-MCNC: 351 MG/DL (ref 70–99)
GLUCOSE SERPL-MCNC: 286 MG/DL (ref 70–99)
GLUCOSE SERPL-MCNC: 347 MG/DL (ref 70–99)
GLUCOSE UR STRIP.AUTO-MCNC: >=1000 MG/DL
HCT VFR BLD AUTO: 37.4 % (ref 40.5–52.5)
HCT VFR BLD AUTO: 39.7 % (ref 40.5–52.5)
HGB BLD-MCNC: 12.6 G/DL (ref 13.5–17.5)
HGB BLD-MCNC: 13.6 G/DL (ref 13.5–17.5)
HGB UR QL STRIP.AUTO: ABNORMAL
HYALINE CASTS #/AREA URNS LPF: ABNORMAL /LPF (ref 0–2)
HYPERCHROMIA BLD QL SMEAR: ABNORMAL
KETONES UR STRIP.AUTO-MCNC: NEGATIVE MG/DL
LACTATE BLDV-SCNC: 1.7 MMOL/L (ref 0.4–2)
LACTATE BLDV-SCNC: 2.2 MMOL/L (ref 0.4–1.9)
LEUKOCYTE ESTERASE UR QL STRIP.AUTO: NEGATIVE
LIPASE SERPL-CCNC: >3000 U/L (ref 13–60)
LYMPHOCYTES # BLD: 0.5 K/UL (ref 1–5.1)
LYMPHOCYTES # BLD: 2 K/UL (ref 1–5.1)
LYMPHOCYTES NFR BLD: 11 %
LYMPHOCYTES NFR BLD: 4.9 %
MCH RBC QN AUTO: 29.7 PG (ref 26–34)
MCH RBC QN AUTO: 29.7 PG (ref 26–34)
MCHC RBC AUTO-ENTMCNC: 33.7 G/DL (ref 31–36)
MCHC RBC AUTO-ENTMCNC: 34.2 G/DL (ref 31–36)
MCV RBC AUTO: 86.8 FL (ref 80–100)
MCV RBC AUTO: 87.9 FL (ref 80–100)
MONOCYTES # BLD: 0.7 K/UL (ref 0–1.3)
MONOCYTES # BLD: 1.7 K/UL (ref 0–1.3)
MONOCYTES NFR BLD: 6.1 %
MONOCYTES NFR BLD: 9 %
MUCOUS THREADS #/AREA URNS LPF: ABNORMAL /LPF
NEUTROPHILS # BLD: 14.9 K/UL (ref 1.7–7.7)
NEUTROPHILS # BLD: 9.7 K/UL (ref 1.7–7.7)
NEUTROPHILS NFR BLD: 80 %
NEUTROPHILS NFR BLD: 88.8 %
NITRITE UR QL STRIP.AUTO: NEGATIVE
OVALOCYTES BLD QL SMEAR: ABNORMAL
PERFORMED ON: ABNORMAL
PH UR STRIP.AUTO: 6 [PH] (ref 5–8)
PLATELET # BLD AUTO: 146 K/UL (ref 135–450)
PLATELET # BLD AUTO: 195 K/UL (ref 135–450)
PLATELET BLD QL SMEAR: ADEQUATE
PMV BLD AUTO: 10.9 FL (ref 5–10.5)
PMV BLD AUTO: 11.6 FL (ref 5–10.5)
POIKILOCYTOSIS BLD QL SMEAR: ABNORMAL
POTASSIUM SERPL-SCNC: 3.7 MMOL/L (ref 3.5–5.1)
POTASSIUM SERPL-SCNC: 4.2 MMOL/L (ref 3.5–5.1)
PROT SERPL-MCNC: 6.3 G/DL (ref 6.4–8.2)
PROT SERPL-MCNC: 7.2 G/DL (ref 6.4–8.2)
PROT UR STRIP.AUTO-MCNC: ABNORMAL MG/DL
RBC # BLD AUTO: 4.25 M/UL (ref 4.2–5.9)
RBC # BLD AUTO: 4.57 M/UL (ref 4.2–5.9)
RBC #/AREA URNS HPF: ABNORMAL /HPF (ref 0–4)
SLIDE REVIEW: ABNORMAL
SODIUM SERPL-SCNC: 135 MMOL/L (ref 136–145)
SODIUM SERPL-SCNC: 141 MMOL/L (ref 136–145)
SP GR UR STRIP.AUTO: 1.02 (ref 1–1.03)
TRIGL SERPL-MCNC: 74 MG/DL (ref 0–150)
UA COMPLETE W REFLEX CULTURE PNL UR: ABNORMAL
UA DIPSTICK W REFLEX MICRO PNL UR: YES
URN SPEC COLLECT METH UR: ABNORMAL
UROBILINOGEN UR STRIP-ACNC: 0.2 E.U./DL
WBC # BLD AUTO: 10.9 K/UL (ref 4–11)
WBC # BLD AUTO: 18.6 K/UL (ref 4–11)
WBC #/AREA URNS HPF: ABNORMAL /HPF (ref 0–5)

## 2024-08-19 PROCEDURE — 74176 CT ABD & PELVIS W/O CONTRAST: CPT

## 2024-08-19 PROCEDURE — 6370000000 HC RX 637 (ALT 250 FOR IP): Performed by: INTERNAL MEDICINE

## 2024-08-19 PROCEDURE — 81001 URINALYSIS AUTO W/SCOPE: CPT

## 2024-08-19 PROCEDURE — 2580000003 HC RX 258: Performed by: EMERGENCY MEDICINE

## 2024-08-19 PROCEDURE — 1200000000 HC SEMI PRIVATE

## 2024-08-19 PROCEDURE — 84478 ASSAY OF TRIGLYCERIDES: CPT

## 2024-08-19 PROCEDURE — 6360000002 HC RX W HCPCS: Performed by: NURSE PRACTITIONER

## 2024-08-19 PROCEDURE — 85025 COMPLETE CBC W/AUTO DIFF WBC: CPT

## 2024-08-19 PROCEDURE — 83690 ASSAY OF LIPASE: CPT

## 2024-08-19 PROCEDURE — 96365 THER/PROPH/DIAG IV INF INIT: CPT

## 2024-08-19 PROCEDURE — 36415 COLL VENOUS BLD VENIPUNCTURE: CPT

## 2024-08-19 PROCEDURE — 83036 HEMOGLOBIN GLYCOSYLATED A1C: CPT

## 2024-08-19 PROCEDURE — 96366 THER/PROPH/DIAG IV INF ADDON: CPT

## 2024-08-19 PROCEDURE — 83605 ASSAY OF LACTIC ACID: CPT

## 2024-08-19 PROCEDURE — 82787 IGG 1 2 3 OR 4 EACH: CPT

## 2024-08-19 PROCEDURE — 6360000002 HC RX W HCPCS: Performed by: INTERNAL MEDICINE

## 2024-08-19 PROCEDURE — 2580000003 HC RX 258: Performed by: NURSE PRACTITIONER

## 2024-08-19 PROCEDURE — 96375 TX/PRO/DX INJ NEW DRUG ADDON: CPT

## 2024-08-19 PROCEDURE — 80053 COMPREHEN METABOLIC PANEL: CPT

## 2024-08-19 PROCEDURE — 6360000002 HC RX W HCPCS: Performed by: EMERGENCY MEDICINE

## 2024-08-19 PROCEDURE — 2580000003 HC RX 258: Performed by: INTERNAL MEDICINE

## 2024-08-19 RX ORDER — 0.9 % SODIUM CHLORIDE 0.9 %
1000 INTRAVENOUS SOLUTION INTRAVENOUS ONCE
Status: COMPLETED | OUTPATIENT
Start: 2024-08-19 | End: 2024-08-19

## 2024-08-19 RX ORDER — INSULIN LISPRO 100 [IU]/ML
0-4 INJECTION, SOLUTION INTRAVENOUS; SUBCUTANEOUS EVERY 4 HOURS
Status: DISCONTINUED | OUTPATIENT
Start: 2024-08-19 | End: 2024-08-20

## 2024-08-19 RX ORDER — MORPHINE SULFATE 4 MG/ML
4 INJECTION, SOLUTION INTRAMUSCULAR; INTRAVENOUS
Status: DISCONTINUED | OUTPATIENT
Start: 2024-08-19 | End: 2024-08-21

## 2024-08-19 RX ORDER — ENOXAPARIN SODIUM 100 MG/ML
30 INJECTION SUBCUTANEOUS 2 TIMES DAILY
Status: DISCONTINUED | OUTPATIENT
Start: 2024-08-19 | End: 2024-08-23 | Stop reason: HOSPADM

## 2024-08-19 RX ORDER — ONDANSETRON 2 MG/ML
4 INJECTION INTRAMUSCULAR; INTRAVENOUS EVERY 6 HOURS PRN
Status: DISCONTINUED | OUTPATIENT
Start: 2024-08-19 | End: 2024-08-23 | Stop reason: HOSPADM

## 2024-08-19 RX ORDER — MAGNESIUM SULFATE IN WATER 40 MG/ML
2000 INJECTION, SOLUTION INTRAVENOUS PRN
Status: DISCONTINUED | OUTPATIENT
Start: 2024-08-19 | End: 2024-08-23 | Stop reason: HOSPADM

## 2024-08-19 RX ORDER — ONDANSETRON 4 MG/1
4 TABLET, ORALLY DISINTEGRATING ORAL EVERY 8 HOURS PRN
Status: DISCONTINUED | OUTPATIENT
Start: 2024-08-19 | End: 2024-08-23 | Stop reason: HOSPADM

## 2024-08-19 RX ORDER — POTASSIUM CHLORIDE 29.8 MG/ML
20 INJECTION INTRAVENOUS PRN
Status: DISCONTINUED | OUTPATIENT
Start: 2024-08-19 | End: 2024-08-23 | Stop reason: HOSPADM

## 2024-08-19 RX ORDER — INSULIN LISPRO 100 [IU]/ML
0-4 INJECTION, SOLUTION INTRAVENOUS; SUBCUTANEOUS NIGHTLY
Status: DISCONTINUED | OUTPATIENT
Start: 2024-08-19 | End: 2024-08-19

## 2024-08-19 RX ORDER — MORPHINE SULFATE 4 MG/ML
4 INJECTION, SOLUTION INTRAMUSCULAR; INTRAVENOUS
Status: COMPLETED | OUTPATIENT
Start: 2024-08-19 | End: 2024-08-19

## 2024-08-19 RX ORDER — SODIUM CHLORIDE 0.9 % (FLUSH) 0.9 %
5-40 SYRINGE (ML) INJECTION PRN
Status: DISCONTINUED | OUTPATIENT
Start: 2024-08-19 | End: 2024-08-23 | Stop reason: HOSPADM

## 2024-08-19 RX ORDER — POTASSIUM CHLORIDE 7.45 MG/ML
10 INJECTION INTRAVENOUS PRN
Status: DISCONTINUED | OUTPATIENT
Start: 2024-08-19 | End: 2024-08-23 | Stop reason: HOSPADM

## 2024-08-19 RX ORDER — SODIUM CHLORIDE 9 MG/ML
INJECTION, SOLUTION INTRAVENOUS PRN
Status: DISCONTINUED | OUTPATIENT
Start: 2024-08-19 | End: 2024-08-23 | Stop reason: HOSPADM

## 2024-08-19 RX ORDER — INSULIN LISPRO 100 [IU]/ML
0-8 INJECTION, SOLUTION INTRAVENOUS; SUBCUTANEOUS
Status: DISCONTINUED | OUTPATIENT
Start: 2024-08-19 | End: 2024-08-19

## 2024-08-19 RX ORDER — SODIUM CHLORIDE, SODIUM LACTATE, POTASSIUM CHLORIDE, CALCIUM CHLORIDE 600; 310; 30; 20 MG/100ML; MG/100ML; MG/100ML; MG/100ML
INJECTION, SOLUTION INTRAVENOUS CONTINUOUS
Status: ACTIVE | OUTPATIENT
Start: 2024-08-19 | End: 2024-08-19

## 2024-08-19 RX ORDER — LANOLIN ALCOHOL/MO/W.PET/CERES
1000 CREAM (GRAM) TOPICAL DAILY
COMMUNITY

## 2024-08-19 RX ORDER — HYDRALAZINE HYDROCHLORIDE 20 MG/ML
10 INJECTION INTRAMUSCULAR; INTRAVENOUS EVERY 6 HOURS PRN
Status: DISCONTINUED | OUTPATIENT
Start: 2024-08-19 | End: 2024-08-23 | Stop reason: HOSPADM

## 2024-08-19 RX ORDER — SODIUM CHLORIDE, SODIUM LACTATE, POTASSIUM CHLORIDE, CALCIUM CHLORIDE 600; 310; 30; 20 MG/100ML; MG/100ML; MG/100ML; MG/100ML
INJECTION, SOLUTION INTRAVENOUS CONTINUOUS
Status: DISCONTINUED | OUTPATIENT
Start: 2024-08-19 | End: 2024-08-21

## 2024-08-19 RX ORDER — GLUCAGON 1 MG/ML
1 KIT INJECTION PRN
Status: DISCONTINUED | OUTPATIENT
Start: 2024-08-19 | End: 2024-08-23 | Stop reason: HOSPADM

## 2024-08-19 RX ORDER — DEXTROSE MONOHYDRATE 100 MG/ML
INJECTION, SOLUTION INTRAVENOUS CONTINUOUS PRN
Status: DISCONTINUED | OUTPATIENT
Start: 2024-08-19 | End: 2024-08-23 | Stop reason: HOSPADM

## 2024-08-19 RX ORDER — SODIUM CHLORIDE 0.9 % (FLUSH) 0.9 %
5-40 SYRINGE (ML) INJECTION EVERY 12 HOURS SCHEDULED
Status: DISCONTINUED | OUTPATIENT
Start: 2024-08-19 | End: 2024-08-23 | Stop reason: HOSPADM

## 2024-08-19 RX ORDER — ONDANSETRON 2 MG/ML
4 INJECTION INTRAMUSCULAR; INTRAVENOUS ONCE
Status: COMPLETED | OUTPATIENT
Start: 2024-08-19 | End: 2024-08-19

## 2024-08-19 RX ADMIN — SODIUM CHLORIDE, POTASSIUM CHLORIDE, SODIUM LACTATE AND CALCIUM CHLORIDE: 600; 310; 30; 20 INJECTION, SOLUTION INTRAVENOUS at 05:37

## 2024-08-19 RX ADMIN — MORPHINE SULFATE 4 MG: 4 INJECTION, SOLUTION INTRAMUSCULAR; INTRAVENOUS at 05:06

## 2024-08-19 RX ADMIN — INSULIN LISPRO 8 UNITS: 100 INJECTION, SOLUTION INTRAVENOUS; SUBCUTANEOUS at 09:37

## 2024-08-19 RX ADMIN — SODIUM CHLORIDE 1000 ML: 9 INJECTION, SOLUTION INTRAVENOUS at 01:34

## 2024-08-19 RX ADMIN — ENOXAPARIN SODIUM 30 MG: 100 INJECTION SUBCUTANEOUS at 22:17

## 2024-08-19 RX ADMIN — MORPHINE SULFATE 4 MG: 4 INJECTION, SOLUTION INTRAMUSCULAR; INTRAVENOUS at 09:37

## 2024-08-19 RX ADMIN — PANTOPRAZOLE SODIUM 40 MG: 40 INJECTION, POWDER, FOR SOLUTION INTRAVENOUS at 12:27

## 2024-08-19 RX ADMIN — ONDANSETRON 4 MG: 2 INJECTION INTRAMUSCULAR; INTRAVENOUS at 01:35

## 2024-08-19 RX ADMIN — MORPHINE SULFATE 4 MG: 4 INJECTION, SOLUTION INTRAMUSCULAR; INTRAVENOUS at 16:12

## 2024-08-19 RX ADMIN — SODIUM CHLORIDE 1000 MG: 900 INJECTION INTRAVENOUS at 01:39

## 2024-08-19 RX ADMIN — MORPHINE SULFATE 4 MG: 4 INJECTION, SOLUTION INTRAMUSCULAR; INTRAVENOUS at 22:17

## 2024-08-19 RX ADMIN — ENOXAPARIN SODIUM 30 MG: 100 INJECTION SUBCUTANEOUS at 09:36

## 2024-08-19 RX ADMIN — SODIUM CHLORIDE, POTASSIUM CHLORIDE, SODIUM LACTATE AND CALCIUM CHLORIDE: 600; 310; 30; 20 INJECTION, SOLUTION INTRAVENOUS at 13:44

## 2024-08-19 RX ADMIN — SODIUM CHLORIDE, POTASSIUM CHLORIDE, SODIUM LACTATE AND CALCIUM CHLORIDE: 600; 310; 30; 20 INJECTION, SOLUTION INTRAVENOUS at 09:39

## 2024-08-19 RX ADMIN — MORPHINE SULFATE 4 MG: 4 INJECTION, SOLUTION INTRAMUSCULAR; INTRAVENOUS at 19:18

## 2024-08-19 RX ADMIN — SODIUM CHLORIDE, POTASSIUM CHLORIDE, SODIUM LACTATE AND CALCIUM CHLORIDE: 600; 310; 30; 20 INJECTION, SOLUTION INTRAVENOUS at 18:03

## 2024-08-19 RX ADMIN — MORPHINE SULFATE 4 MG: 4 INJECTION, SOLUTION INTRAMUSCULAR; INTRAVENOUS at 01:32

## 2024-08-19 ASSESSMENT — PAIN - FUNCTIONAL ASSESSMENT
PAIN_FUNCTIONAL_ASSESSMENT: ACTIVITIES ARE NOT PREVENTED
PAIN_FUNCTIONAL_ASSESSMENT: ACTIVITIES ARE NOT PREVENTED

## 2024-08-19 ASSESSMENT — PAIN DESCRIPTION - FREQUENCY
FREQUENCY: INTERMITTENT
FREQUENCY: INTERMITTENT

## 2024-08-19 ASSESSMENT — PAIN SCALES - GENERAL
PAINLEVEL_OUTOF10: 7
PAINLEVEL_OUTOF10: 0
PAINLEVEL_OUTOF10: 0
PAINLEVEL_OUTOF10: 8
PAINLEVEL_OUTOF10: 0
PAINLEVEL_OUTOF10: 9
PAINLEVEL_OUTOF10: 8
PAINLEVEL_OUTOF10: 4
PAINLEVEL_OUTOF10: 7
PAINLEVEL_OUTOF10: 0

## 2024-08-19 ASSESSMENT — PAIN DESCRIPTION - ORIENTATION
ORIENTATION: MID;RIGHT;LEFT;INNER
ORIENTATION: RIGHT;LEFT;MID
ORIENTATION: LOWER;RIGHT;LEFT
ORIENTATION: MID;RIGHT;LEFT
ORIENTATION: RIGHT;LEFT;MID

## 2024-08-19 ASSESSMENT — PAIN DESCRIPTION - DESCRIPTORS
DESCRIPTORS: CRAMPING;ACHING
DESCRIPTORS: ACHING;DISCOMFORT
DESCRIPTORS: CRAMPING
DESCRIPTORS: SHARP;ACHING
DESCRIPTORS: ACHING;DISCOMFORT

## 2024-08-19 ASSESSMENT — PAIN DESCRIPTION - LOCATION
LOCATION: ABDOMEN

## 2024-08-19 ASSESSMENT — PAIN DESCRIPTION - ONSET
ONSET: SUDDEN
ONSET: SUDDEN

## 2024-08-19 ASSESSMENT — PAIN SCALES - WONG BAKER
WONGBAKER_NUMERICALRESPONSE: HURTS A LITTLE BIT
WONGBAKER_NUMERICALRESPONSE: NO HURT

## 2024-08-19 ASSESSMENT — PAIN DESCRIPTION - PAIN TYPE
TYPE: ACUTE PAIN
TYPE: ACUTE PAIN

## 2024-08-19 NOTE — CARE COORDINATION
Case Management Assessment  Initial Evaluation    Date/Time of Evaluation: 8/19/2024 10:33 AM  Assessment Completed by: Nancy Pablo RN    If patient is discharged prior to next notation, then this note serves as note for discharge by case management.    Patient Name: Juan Guidry                   YOB: 1950  Diagnosis: Acute pancreatitis without infection or necrosis [K85.90]  Acute pancreatitis, unspecified complication status, unspecified pancreatitis type [K85.90]                   Date / Time: 8/18/2024 11:46 PM    Patient Admission Status: Inpatient   Readmission Risk (Low < 19, Mod (19-27), High > 27): Readmission Risk Score: 9.5    Current PCP: Zeferino Carson III, MD  PCP verified by CM? Yes    Chart Reviewed: Yes      History Provided by: Patient  Patient Orientation: Alert and Oriented    Patient Cognition: Alert    Hospitalization in the last 30 days (Readmission):  No    If yes, Readmission Assessment in CM Navigator will be completed.    Advance Directives:      Code Status: Full Code   Patient's Primary Decision Maker is: Legal Next of Kin      Discharge Planning:    Patient lives with: Spouse/Significant Other Type of Home: Trailer/Mobile Home  Primary Care Giver: Self  Patient Support Systems include: Spouse/Significant Other   Current Financial resources: Medicare  Current community resources: None  Current services prior to admission: None            Current DME:              Type of Home Care services:  None    ADLS  Prior functional level: Independent in ADLs/IADLs  Current functional level: Independent in ADLs/IADLs    PT AM-PAC:   /24  OT AM-PAC:   /24    Family can provide assistance at DC: Yes  Would you like Case Management to discuss the discharge plan with any other family members/significant others, and if so, who? Yes (spouse)  Plans to Return to Present Housing: Yes  Other Identified Issues/Barriers to RETURNING to current housing: none  Potential

## 2024-08-19 NOTE — FLOWSHEET NOTE
08/19/24 0930   Vital Signs   Temp 97.7 °F (36.5 °C)   Temp Source Oral   Pulse 85   Heart Rate Source Monitor   Respirations 16   BP (!) 171/89   MAP (Calculated) 116   BP Location Right upper arm   BP Method Automatic   Patient Position Supine   Oxygen Therapy   SpO2 96 %   O2 Device None (Room air)     Pt a/o. Am assessment completed see flow sheet. . Call light within reach. Morphine 4 mg given at 0937 abdominal pain 9/10, relief noted at this time. Call light in reach.  Patient able to make needs known.

## 2024-08-19 NOTE — H&P
[address] [address]                           HISTORY & PHYSICAL      PATIENT NAME: AGUILAR MERAZ            : 1950  MED REC NO: [id number]                      ROOM: 0233  ACCOUNT NO: [id number]                       ADMIT DATE: 2024  PROVIDER: Duc Meyers MD      I examined the patient on 2024 in the emergency room.    CHIEF COMPLAINT:  Sudden-onset abdominal pain.    HISTORY OF PRESENT ILLNESS:  The patient is a 74-year-old  male presenting to the hospital with chief complaint of 1-day history of sudden onset of severe epigastric and periumbilical abdominal pain that started yesterday in the evening at around 8 o'clock, associated with nausea and some dry heaving without any fevers or chills.  The patient notes that the pain radiated to the back and just got very severe to a point where he almost thought he was having a kidney stone.  At the time of my exam, the patient complains of a very dry mouth and still states that his pain is coming back in waves.  He has never experienced something like this before.    PAST MEDICAL HISTORY:    1. COPD.  2. Type 2 diabetes.  3. Dyslipidemia.    PAST SURGICAL HISTORY:  No recent surgical procedures.    ALLERGIES:  NO KNOWN DRUG ALLERGIES.      FAMILY HISTORY:  Reviewed by me and is currently noncontributory.    SOCIAL HISTORY:  Lives at home.  Tobacco dependence.  No alcohol use.    MEDICATIONS:  The patient's home medication list is reviewed and documented in the EMR.    REVIEW OF SYSTEMS:  Significant for the abdominal pain and per the history of present illness.  All other systems have been reviewed and are negative, except the history of present illness.    PHYSICAL EXAMINATION:  VITAL SIGNS:  Temperature 97.3, respiratory rate is 17, pulse 84, blood pressure 105/72, saturating 95% on room air.  CNS:  Alert, awake, and oriented to time, place,

## 2024-08-19 NOTE — ED PROVIDER NOTES
[facility] ED  EMERGENCY DEPARTMENT ENCOUNTER      Pt Name: Juan Guidry  MRN: [id number]  Birthdate 1950  Date of evaluation: 8/18/2024  Provider: Mai Jeter MD    CHIEF COMPLAINT       Chief Complaint   Patient presents with    Lower Abdominal Pain     Pt arrives with c/o lower abdominal pain that started approx 4 hours ago. Pt rates pain 10/10. Pt states this feels similar to other kidney stones he has had in the past.         HISTORY OF PRESENT ILLNESS   (Location/Symptom, Timing/Onset, Context/Setting, Quality, Duration, Modifying Factors, Severity)  Note limiting factors.   Juan Guidry is a 74 y.o. male who presents to the emergency department with abdominal pain.  Abdominal pain started approximately 4 hours prior to presentation.  He states it feels like kidney stones he has had in the past in the sense that he \"cannot get comfortable\".  No flank pain or back pain.  No dysuria or hematuria.  No nausea or vomiting.  No chest pain or shortness of breath.  Patient has a history of colon cancer status post remote resection      This patient is at risk for a communicable infection. Therefore, personal protection equipment consisting of a mask and gloves worn for the exam.     Nursing Notes were reviewed.    REVIEW OF SYSTEMS    (2-9 systems for level 4, 10 or more for level 5)     As per HPI    Except as noted above the remainder of the review of systems was reviewed and negative.       PAST MEDICAL HISTORY     Past Medical History:   Diagnosis Date    Cerebral artery occlusion with cerebral infarction (HCC)     Depression     Hypertension          SURGICAL HISTORY       Past Surgical History:   Procedure Laterality Date    NOSE SURGERY      SALPINGO-OOPHORECTOMY      UPPER GASTROINTESTINAL ENDOSCOPY N/A 9/10/2021    EGD BIOPSY performed by Marcos LUJAN MD at [facility] ENDOSCOPY    WISDOM TOOTH EXTRACTION           CURRENT MEDICATIONS       Previous Medications    [address]  Emergency Physician            Mai Jeter MD  08/19/24 2039

## 2024-08-19 NOTE — PROGRESS NOTES
H/p dictation id [id number].  Date of service 08/19/24.  Acute pancreatitis.  Dm II.  Dyslipidemia.  Tobacco dependence.

## 2024-08-19 NOTE — PROGRESS NOTES
Patient admitted to room 233 from ER. Side rails up x2. Patient does not have an order for telemetry. Bed is locked and in lowest position. Call light placed in patient reach. Patient explained the routine of the hospital, including but not limited to lab work, vital signs, hourly rounding, etc. Care plans and education updated, CHG wipes done at time of admission.     BP (!) 149/77   Pulse 83   Temp 97.9 °F (36.6 °C) (Oral)   Resp 16   Ht 1.753 m (5' 9\")   Wt 104.2 kg (229 lb 11.2 oz)   SpO2 95%   BMI 33.92 kg/m²     Most recent set of vitals as shown.     Patient has an LDA that does not require CHG wipes, including possible a surgery incision, morataya catheter, or a central line.     4 Eyes Skin Assessment     The patient is being assess for   Admission    I agree that 2 RN's have performed a thorough Head to Toe Skin Assessment on the patient. ALL assessment sites listed below have been assessed.      Pt has no skin issues.  Areas assessed for pressure by both nurses:   [x]   Head, Face, and Ears   [x]   Shoulders, Back, and Chest, Abdomen  [x]   Arms, Elbows, and Hands   [x]   Coccyx, Sacrum, and Ischium  [x]   Legs, Feet, and Heels        Skin Assessed Under all Medical Devices by both nurses:  NA               All Mepilex Borders were peeled back and area peeked at by both nurses:  No: NA  Please list where Mepilex Borders are located:  NA             **SHARE this note so that the co-signing nurse is able to place an eSignature**    Co-signer eSignature: {Esignature:[id number]}    Does the Patient have Skin Breakdown related to pressure?  No            Teddy Prevention initiated:  NA   Wound Care Orders initiated:  NA      [facility] nurse consulted for Pressure Injury (Stage 3,4, Unstageable, DTI, NWPT, Complex wounds)and New or Established Ostomies:  NA      Primary Nurse eSignature: Electronically signed by DILLON SÁNCHEZ RN on 8/19/24 at 6:34 AM EDT      Bedside Mobility Assessment Tool (BMAT):

## 2024-08-19 NOTE — CONSULTS
Gastroenterology Consult Note    Patient:   Juan Guidry   :    1950   Facility:   [facility]   Referring/PCP: Zeferino Carson III, MD  Date:     2024  Consultant:   Chris Cifuentes MD, MD      Chief Complaint   Patient presents with    Lower Abdominal Pain     Pt arrives with c/o lower abdominal pain that started approx 4 hours ago. Pt rates pain 10/10. Pt states this feels similar to other kidney stones he has had in the past.        History of Present illness     74 year old male with history of HTN, HLD, DM, CHF, CVA, COPD, PVD s/p carotid endarcterectomy, nephrolithiasis, colon cancer s/p R hemicolectomy (), depression, anemia, biliary pancreatitis s/p cholecystectomy(2022) presented to ER with complaints of severe abdominal pain. He developed severe abdominal pain at midnight which significantly worsened prompting the ER visit. He denies ever having such severe pain. He denies any recent change in medications. He denies alcohol abuse. He has no family history of pancreatitis.    Past Medical History:   Diagnosis Date    Cerebral artery occlusion with cerebral infarction (HCC)     Depression     Hypertension      Past Surgical History:   Procedure Laterality Date    NOSE SURGERY      SALPINGO-OOPHORECTOMY      UPPER GASTROINTESTINAL ENDOSCOPY N/A 9/10/2021    EGD BIOPSY performed by Marcos LUJAN MD at [facility] ENDOSCOPY    WISDOM TOOTH EXTRACTION         Social:   Social History     Tobacco Use    Smoking status: Every Day     Current packs/day: 0.50     Types: Cigarettes    Smokeless tobacco: Never   Substance Use Topics    Alcohol use: Yes     Comment: occ      Family: History reviewed. No pertinent family history.  No current facility-administered medications on file prior to encounter.     Current Outpatient Medications on File Prior to Encounter   Medication Sig Dispense Refill    vitamin B-12 (CYANOCOBALAMIN) 1000 MCG tablet Take 1 tablet  swings    Physical Exam   BP (!) 152/88   Pulse 76   Temp 97.8 °F (36.6 °C) (Oral)   Resp 18   Ht 1.753 m (5' 9\")   Wt 104.2 kg (229 lb 11.2 oz)   SpO2 97%   BMI 33.92 kg/m²     General : alert, appears stated age, and cooperative  Head: Normocephalic, without obvious abnormality  Eyes:  no icterus  Neck: supple, symmetrical, trachea midline  Heart: regular rate and rhythm  Abdomen:  soft, tender, distended  Extremities:  no edema    Lab and Imaging Review   Labs:  CBC:   Recent Labs     08/19/24  0007 08/19/24  1059   WBC 18.6* 10.9   HGB 13.6 12.6*   HCT 39.7* 37.4*   MCV 86.8 87.9    146     BMP:   Recent Labs     08/19/24  0007 08/19/24  1059   * 141   K 3.7 4.2   CL 97* 105   CO2 26 27   BUN 13 18   CREATININE 1.3 1.1     LIVER PROFILE:   Recent Labs     08/19/24  0007 08/19/24  1059   * 254*   * 181*   LIPASE >3,000.0*  --    BILITOT 1.8* 2.9*   ALKPHOS 121 110     CT abd/pelvis:  1. Acute pancreatitis. No organized pancreatic fluid collection/pseudocyst.  2. Fatty liver.  3. Remote right hemicolectomy.  4. Mild localized ileus in the mid abdomen.  5. Few scattered left colon diverticula with no evidence of acute  diverticulitis.    Assessment:     74 year old male with history of HTN, HLD, DM, CHF, CVA, COPD, PVD s/p carotid endarcterectomy, nephrolithiasis, colon cancer s/p R hemicolectomy (08/20), depression, anemia, biliary pancreatitis s/p cholecystectomy(05/2022) admitted with acute pancreatitis, likely secondary to biliary stone but cannot exclude triglycerides and autoimmune    Plan:   Continue supportive care  NPO, IVF, pain control  Check MRCP  Check TG and IgG4  Monitor LFTs  Will consider a trial of clear diet once abd pain improves      Chris Cifuentes MD, MD  6:41 PM 8/19/2024

## 2024-08-19 NOTE — FLOWSHEET NOTE
08/19/24 1944   Handoff   Communication Given Shift Handoff   Handoff Given To Anne Marie   Handoff Received From Malik   Handoff Communication Face to Face;At bedside   Time Handoff Given 1930   End of Shift Check Performed Yes

## 2024-08-19 NOTE — PROGRESS NOTES
This is a patient of [facility]. I asked nursing to redirect the consult to them.

## 2024-08-19 NOTE — PROGRESS NOTES
Consult has been called to Dr. cooper on 8/19/24. Spoke with hilton. 10:17 AM    Mechelle Edgar  8/19/2024

## 2024-08-20 ENCOUNTER — APPOINTMENT (OUTPATIENT)
Dept: MRI IMAGING | Age: 74
End: 2024-08-20
Payer: MEDICARE

## 2024-08-20 PROBLEM — I10 PRIMARY HYPERTENSION: Status: ACTIVE | Noted: 2024-08-20

## 2024-08-20 PROBLEM — E11.9 TYPE 2 DIABETES MELLITUS WITHOUT COMPLICATION, WITHOUT LONG-TERM CURRENT USE OF INSULIN (HCC): Status: ACTIVE | Noted: 2024-08-20

## 2024-08-20 LAB
ALBUMIN SERPL-MCNC: 3.1 G/DL (ref 3.4–5)
ALBUMIN/GLOB SERPL: 1.3 {RATIO} (ref 1.1–2.2)
ALP SERPL-CCNC: 88 U/L (ref 40–129)
ALT SERPL-CCNC: 105 U/L (ref 10–40)
ANION GAP SERPL CALCULATED.3IONS-SCNC: 9 MMOL/L (ref 3–16)
AST SERPL-CCNC: 86 U/L (ref 15–37)
BASOPHILS # BLD: 0 K/UL (ref 0–0.2)
BASOPHILS NFR BLD: 0.1 %
BILIRUB SERPL-MCNC: 2.3 MG/DL (ref 0–1)
BUN SERPL-MCNC: 19 MG/DL (ref 7–20)
CALCIUM SERPL-MCNC: 7.5 MG/DL (ref 8.3–10.6)
CHLORIDE SERPL-SCNC: 104 MMOL/L (ref 99–110)
CO2 SERPL-SCNC: 26 MMOL/L (ref 21–32)
CREAT SERPL-MCNC: 1.2 MG/DL (ref 0.8–1.3)
DEPRECATED RDW RBC AUTO: 14.3 % (ref 12.4–15.4)
EOSINOPHIL # BLD: 0 K/UL (ref 0–0.6)
EOSINOPHIL NFR BLD: 0 %
EST. AVERAGE GLUCOSE BLD GHB EST-MCNC: 217.3 MG/DL
GFR SERPLBLD CREATININE-BSD FMLA CKD-EPI: 64 ML/MIN/{1.73_M2}
GLUCOSE BLD-MCNC: 116 MG/DL (ref 70–99)
GLUCOSE BLD-MCNC: 122 MG/DL (ref 70–99)
GLUCOSE BLD-MCNC: 141 MG/DL (ref 70–99)
GLUCOSE BLD-MCNC: 171 MG/DL (ref 70–99)
GLUCOSE SERPL-MCNC: 147 MG/DL (ref 70–99)
HBA1C MFR BLD: 9.2 %
HCT VFR BLD AUTO: 34.5 % (ref 40.5–52.5)
HGB BLD-MCNC: 11.5 G/DL (ref 13.5–17.5)
LYMPHOCYTES # BLD: 0.9 K/UL (ref 1–5.1)
LYMPHOCYTES NFR BLD: 5.9 %
MCH RBC QN AUTO: 29.5 PG (ref 26–34)
MCHC RBC AUTO-ENTMCNC: 33.3 G/DL (ref 31–36)
MCV RBC AUTO: 88.4 FL (ref 80–100)
MONOCYTES # BLD: 1 K/UL (ref 0–1.3)
MONOCYTES NFR BLD: 6.6 %
NEUTROPHILS # BLD: 13.2 K/UL (ref 1.7–7.7)
NEUTROPHILS NFR BLD: 87.4 %
PERFORMED ON: ABNORMAL
PLATELET # BLD AUTO: 118 K/UL (ref 135–450)
PMV BLD AUTO: 10.6 FL (ref 5–10.5)
POTASSIUM SERPL-SCNC: 4.1 MMOL/L (ref 3.5–5.1)
PROT SERPL-MCNC: 5.5 G/DL (ref 6.4–8.2)
RBC # BLD AUTO: 3.9 M/UL (ref 4.2–5.9)
SODIUM SERPL-SCNC: 139 MMOL/L (ref 136–145)
WBC # BLD AUTO: 15.1 K/UL (ref 4–11)

## 2024-08-20 PROCEDURE — 74181 MRI ABDOMEN W/O CONTRAST: CPT

## 2024-08-20 PROCEDURE — 6360000002 HC RX W HCPCS: Performed by: NURSE PRACTITIONER

## 2024-08-20 PROCEDURE — 99233 SBSQ HOSP IP/OBS HIGH 50: CPT | Performed by: INTERNAL MEDICINE

## 2024-08-20 PROCEDURE — 2580000003 HC RX 258: Performed by: INTERNAL MEDICINE

## 2024-08-20 PROCEDURE — 1200000000 HC SEMI PRIVATE

## 2024-08-20 PROCEDURE — 6360000002 HC RX W HCPCS: Performed by: INTERNAL MEDICINE

## 2024-08-20 PROCEDURE — 85025 COMPLETE CBC W/AUTO DIFF WBC: CPT

## 2024-08-20 PROCEDURE — 80053 COMPREHEN METABOLIC PANEL: CPT

## 2024-08-20 PROCEDURE — 36415 COLL VENOUS BLD VENIPUNCTURE: CPT

## 2024-08-20 RX ORDER — INSULIN LISPRO 100 [IU]/ML
0-4 INJECTION, SOLUTION INTRAVENOUS; SUBCUTANEOUS EVERY 6 HOURS
Status: DISCONTINUED | OUTPATIENT
Start: 2024-08-20 | End: 2024-08-22

## 2024-08-20 RX ORDER — MORPHINE SULFATE 2 MG/ML
2 INJECTION, SOLUTION INTRAMUSCULAR; INTRAVENOUS EVERY 4 HOURS PRN
Status: DISCONTINUED | OUTPATIENT
Start: 2024-08-20 | End: 2024-08-21

## 2024-08-20 RX ADMIN — MORPHINE SULFATE 2 MG: 2 INJECTION, SOLUTION INTRAMUSCULAR; INTRAVENOUS at 16:21

## 2024-08-20 RX ADMIN — MORPHINE SULFATE 2 MG: 2 INJECTION, SOLUTION INTRAMUSCULAR; INTRAVENOUS at 10:24

## 2024-08-20 RX ADMIN — MORPHINE SULFATE 4 MG: 4 INJECTION, SOLUTION INTRAMUSCULAR; INTRAVENOUS at 01:18

## 2024-08-20 RX ADMIN — MORPHINE SULFATE 2 MG: 2 INJECTION, SOLUTION INTRAMUSCULAR; INTRAVENOUS at 23:19

## 2024-08-20 RX ADMIN — ENOXAPARIN SODIUM 30 MG: 100 INJECTION SUBCUTANEOUS at 10:34

## 2024-08-20 RX ADMIN — SODIUM CHLORIDE, POTASSIUM CHLORIDE, SODIUM LACTATE AND CALCIUM CHLORIDE: 600; 310; 30; 20 INJECTION, SOLUTION INTRAVENOUS at 16:20

## 2024-08-20 RX ADMIN — MORPHINE SULFATE 4 MG: 4 INJECTION, SOLUTION INTRAMUSCULAR; INTRAVENOUS at 07:04

## 2024-08-20 RX ADMIN — SODIUM CHLORIDE, POTASSIUM CHLORIDE, SODIUM LACTATE AND CALCIUM CHLORIDE: 600; 310; 30; 20 INJECTION, SOLUTION INTRAVENOUS at 07:07

## 2024-08-20 RX ADMIN — ENOXAPARIN SODIUM 30 MG: 100 INJECTION SUBCUTANEOUS at 21:46

## 2024-08-20 RX ADMIN — SODIUM CHLORIDE, POTASSIUM CHLORIDE, SODIUM LACTATE AND CALCIUM CHLORIDE: 600; 310; 30; 20 INJECTION, SOLUTION INTRAVENOUS at 21:46

## 2024-08-20 RX ADMIN — SODIUM CHLORIDE, POTASSIUM CHLORIDE, SODIUM LACTATE AND CALCIUM CHLORIDE: 600; 310; 30; 20 INJECTION, SOLUTION INTRAVENOUS at 01:21

## 2024-08-20 RX ADMIN — Medication 10 ML: at 10:41

## 2024-08-20 ASSESSMENT — PAIN DESCRIPTION - FREQUENCY
FREQUENCY: INTERMITTENT
FREQUENCY: CONTINUOUS
FREQUENCY: INTERMITTENT

## 2024-08-20 ASSESSMENT — PAIN SCALES - GENERAL
PAINLEVEL_OUTOF10: 5
PAINLEVEL_OUTOF10: 0
PAINLEVEL_OUTOF10: 6
PAINLEVEL_OUTOF10: 2
PAINLEVEL_OUTOF10: 7
PAINLEVEL_OUTOF10: 5
PAINLEVEL_OUTOF10: 6
PAINLEVEL_OUTOF10: 2
PAINLEVEL_OUTOF10: 0
PAINLEVEL_OUTOF10: 7
PAINLEVEL_OUTOF10: 0
PAINLEVEL_OUTOF10: 2

## 2024-08-20 ASSESSMENT — PAIN DESCRIPTION - DESCRIPTORS
DESCRIPTORS: ACHING;DISCOMFORT
DESCRIPTORS: CRAMPING
DESCRIPTORS: ACHING;DISCOMFORT
DESCRIPTORS: ACHING;CRAMPING
DESCRIPTORS: ACHING

## 2024-08-20 ASSESSMENT — PAIN DESCRIPTION - PAIN TYPE
TYPE: ACUTE PAIN

## 2024-08-20 ASSESSMENT — PAIN DESCRIPTION - LOCATION
LOCATION: ABDOMEN

## 2024-08-20 ASSESSMENT — PAIN DESCRIPTION - ORIENTATION
ORIENTATION: MID
ORIENTATION: RIGHT;LEFT;MID
ORIENTATION: RIGHT;MID
ORIENTATION: RIGHT;LOWER
ORIENTATION: RIGHT;LEFT;MID

## 2024-08-20 ASSESSMENT — PAIN DESCRIPTION - ONSET
ONSET: ON-GOING
ONSET: SUDDEN
ONSET: PROGRESSIVE
ONSET: SUDDEN
ONSET: SUDDEN

## 2024-08-20 ASSESSMENT — PAIN SCALES - WONG BAKER
WONGBAKER_NUMERICALRESPONSE: HURTS A LITTLE BIT
WONGBAKER_NUMERICALRESPONSE: NO HURT
WONGBAKER_NUMERICALRESPONSE: HURTS A LITTLE BIT
WONGBAKER_NUMERICALRESPONSE: HURTS A LITTLE BIT

## 2024-08-20 NOTE — FLOWSHEET NOTE
08/20/24 1924   Handoff   Communication Given Shift Handoff   Handoff Given To Anne Marie   Handoff Received From Malik   Handoff Communication Face to Face;At bedside   Time Handoff Given 1922   End of Shift Check Performed Yes

## 2024-08-20 NOTE — FLOWSHEET NOTE
Pt a/o. Am assessment completed see flow sheet. Morphine given as needed per PRN order. Call light within reach.     08/20/24 1024   Vital Signs   Temp (!) 100.8 °F (38.2 °C)   Temp Source Oral   Pulse (!) 102   Heart Rate Source Monitor   Respirations 20   BP (!) 159/95   MAP (Calculated) 116   BP Location Left upper arm   BP Method Automatic   Patient Position Semi fowlers   Pain Assessment   Pain Level 5   Oxygen Therapy   SpO2 (!) 89 %     Re check 02 sat 94% on room air

## 2024-08-20 NOTE — PLAN OF CARE
Problem: Pain  Goal: Verbalizes/displays adequate comfort level or baseline comfort level  Outcome: Progressing  Flowsheets (Taken 8/20/2024 8655)  Verbalizes/displays adequate comfort level or baseline comfort level:   Encourage patient to monitor pain and request assistance   Assess pain using appropriate pain scale   Administer analgesics based on type and severity of pain and evaluate response   Implement non-pharmacological measures as appropriate and evaluate response   Patient request IV morphine for abdomen pain intermittent.

## 2024-08-20 NOTE — PROGRESS NOTES
PROGRESS NOTE  S:74 yrs Patient  admitted on 8/18/2024 with Acute pancreatitis without infection or necrosis [K85.90]  Acute pancreatitis, unspecified complication status, unspecified pancreatitis type [K85.90] .  Today he complains of Abdominal Pain. Passing flatus. Denies nausea and vomiting.    Exam:   Vitals:    08/20/24 1651   BP:    Pulse:    Resp: 18   Temp:    SpO2:      General : alert, appears stated age, and cooperative  Head: Normocephalic, without obvious abnormality  Eyes:  no icterus  Neck: supple, symmetrical, trachea midline  Heart: regular rate and rhythm  Abdomen:  soft, tender, distended  Extremities:  no edema    Medications: Reviewed    Labs:  CBC:   Recent Labs     08/19/24  0007 08/19/24  1059 08/20/24  0618   WBC 18.6* 10.9 15.1*   HGB 13.6 12.6* 11.5*   HCT 39.7* 37.4* 34.5*   MCV 86.8 87.9 88.4    146 118*     BMP:   Recent Labs     08/19/24  0007 08/19/24  1059 08/20/24  0618   * 141 139   K 3.7 4.2 4.1   CL 97* 105 104   CO2 26 27 26   BUN 13 18 19   CREATININE 1.3 1.1 1.2     LIVER PROFILE:   Recent Labs     08/19/24  0007 08/19/24  1059 08/20/24  0618   * 254* 86*   * 181* 105*   LIPASE >3,000.0*  --   --    BILITOT 1.8* 2.9* 2.3*   ALKPHOS 121 110 88     MRCP:  Surgically absent gallbladder.  No significant biliary ductal dilatation. There is fatty infiltration liver and a liver cyst     Findings of acute pancreatitis.  No pancreatic ductal dilatation.  No significant change in peripancreatic fluid and edema compared to prior CT scan 08/19/2024    Impression:74 year old male with history of HTN, HLD, DM, CHF, CVA, COPD, PVD s/p carotid endarcterectomy, nephrolithiasis, colon cancer s/p R hemicolectomy (08/20), depression, anemia, biliary pancreatitis s/p cholecystectomy(05/2022) admitted with acute pancreatitis, likely secondary to biliary stone but cannot exclude triglycerides and autoimmune     Recommendation:  Continue

## 2024-08-20 NOTE — PROGRESS NOTES
/73   Pulse 100   Temp 97.7 °F (36.5 °C) (Oral)   Resp 16   Ht 1.753 m (5' 9\")   Wt 104.2 kg (229 lb 11.2 oz)   SpO2 94%   BMI 33.92 kg/m²     Pt awake in bed. Pt alert and orientedX4. Pt states a pain level of 7 on a 0-10 pain scale. Pt given PRN Morphine. Administration can be found in the MAR. Assessment complete. Meds passed. Pt denies needs at this time.        Bedside Mobility Assessment Tool (BMAT):     Assessment Level 1- Sit and Shake    1. From a semi-reclined position, ask patient to sit up and rotate to a seated position at the side of the bed. Can use the bedrail.    2. Ask patient to reach out and grab your hand and shake making sure patient reaches across his/her midline.   Pass- Patient is able to come to a seated position, maintain core strength. Maintains seated balance while reaching across midline. Move on to Assessment Level 2.     Assessment Level 2- Stretch and Point   1. With patient in seated position at the side of the bed, have patient place both feet on the floor (or stool) with knees no higher than hips.    2. Ask patient to stretch one leg and straighten the knee, then bend the ankle/flex and point the toes. If appropriate, repeat with the other leg.   Pass- Patient is able to demonstrate appropriate quad strength on intended weight bearing limb(s). Move onto Assessment Level 3.     Assessment Level 3- Stand   1. Ask patient to elevate off the bed or chair (seated to standing) using an assistive device (cane, bedrail).    2. Patient should be able to raise buttocks off be and hold for a count of five. May repeat once.   Pass- Patient maintains standing stability for at least 5 seconds, proceed to assessment level 4.    Assessment Level 4- Walk   1. Ask patient to march in place at bedside.    2. Then ask patient to advance step and return each foot. Some medical conditions may render a patient from stepping backwards, use your best clinical judgement.   Pass- Patient

## 2024-08-20 NOTE — PROGRESS NOTES
Admit: 2024    Name:  Juan Guidry  Room:  0233/0233-01  MRN:    [id number]    Daily Progress Note for 2024   Acute pancreatitis  Interval History:   Continues to have epigastric pain  Low grade fever     Scheduled Meds:   insulin lispro  0-4 Units SubCUTAneous Q6H    sodium chloride flush  5-40 mL IntraVENous 2 times per day    enoxaparin  30 mg SubCUTAneous BID    pantoprazole (PROTONIX) 40 mg in sodium chloride (PF) 0.9 % 10 mL injection  40 mg IntraVENous Daily       Continuous Infusions:   lactated ringers IV soln 150 mL/hr at 24 0707    sodium chloride      dextrose         PRN Meds:  morphine, sodium chloride flush, sodium chloride, potassium chloride **OR** potassium chloride, magnesium sulfate, ondansetron **OR** ondansetron, morphine, glucose, dextrose bolus **OR** dextrose bolus, glucagon (rDNA), dextrose, hydrALAZINE                  Objective:     Temp  Av.7 °F (37.1 °C)  Min: 97.7 °F (36.5 °C)  Max: 100.8 °F (38.2 °C)  Pulse  Av.8  Min: 76  Max: 102  BP  Min: 121/79  Max: 159/95  SpO2  Av %  Min: 86 %  Max: 97 %  Patient Vitals for the past 4 hrs:   BP Temp Temp src Pulse Resp SpO2   24 1024 (!) 159/95 (!) 100.8 °F (38.2 °C) Oral (!) 102 20 (!) 89 %   24 0734 -- -- -- -- 18 --         Intake/Output Summary (Last 24 hours) at 2024 1106  Last data filed at 2024 0613  Gross per 24 hour   Intake --   Output 750 ml   Net -750 ml       Physical Exam:  CNS:  Alert, awake, and oriented to time, place, and person.  PSYCH:  The patient is cooperative, answering questions appropriately.  EYES:  Pupils are bilaterally equal and reactive to light.  ENT:  No oral mucosal lesions.  RESPIRATORY:  Clear to auscultation.  CVS:  S1, S2 are heard.  No murmurs or rubs.  ABDOMEN:  Nondistended.  The patient has significant periumbilical and epigastric tenderness to palpation.  MUSCULOSKELETAL:  No acute deformities.   SKIN:  Without obvious rashes or lesions.    Lab

## 2024-08-20 NOTE — PROGRESS NOTES
/79   Pulse (!) 101   Temp 98.4 °F (36.9 °C) (Oral)   Resp 18   Ht 1.753 m (5' 9\")   Wt 104.2 kg (229 lb 11.2 oz)   SpO2 94%   BMI 33.92 kg/m²   Pt VS as shown above. Pt SpO2 was at 86% on room air. Pt put on 2L NC and SpO2 at 94%.

## 2024-08-20 NOTE — PROGRESS NOTES
Pt c/o abdominal pain 5/10. Medicated with 2 mg IVP Morphine per MAR. Bed alarm set and patient declines any additional needs. .me

## 2024-08-21 ENCOUNTER — APPOINTMENT (OUTPATIENT)
Dept: GENERAL RADIOLOGY | Age: 74
End: 2024-08-21
Payer: MEDICARE

## 2024-08-21 ENCOUNTER — APPOINTMENT (OUTPATIENT)
Dept: CT IMAGING | Age: 74
End: 2024-08-21
Payer: MEDICARE

## 2024-08-21 PROBLEM — R14.0 ABDOMINAL DISTENTION: Status: ACTIVE | Noted: 2024-08-21

## 2024-08-21 PROBLEM — D72.829 LEUKOCYTOSIS: Status: ACTIVE | Noted: 2024-08-21

## 2024-08-21 LAB
ALBUMIN SERPL-MCNC: 3.2 G/DL (ref 3.4–5)
ALBUMIN SERPL-MCNC: 3.3 G/DL (ref 3.4–5)
ALBUMIN/GLOB SERPL: 1 {RATIO} (ref 1.1–2.2)
ALBUMIN/GLOB SERPL: 1.6 {RATIO} (ref 1.1–2.2)
ALP SERPL-CCNC: 77 U/L (ref 40–129)
ALP SERPL-CCNC: 87 U/L (ref 40–129)
ALT SERPL-CCNC: 68 U/L (ref 10–40)
ALT SERPL-CCNC: 71 U/L (ref 10–40)
ANION GAP SERPL CALCULATED.3IONS-SCNC: 13 MMOL/L (ref 3–16)
ANION GAP SERPL CALCULATED.3IONS-SCNC: 14 MMOL/L (ref 3–16)
AST SERPL-CCNC: 47 U/L (ref 15–37)
AST SERPL-CCNC: 48 U/L (ref 15–37)
BASE EXCESS BLDA CALC-SCNC: -0.4 MMOL/L (ref -3–3)
BASOPHILS # BLD: 0 K/UL (ref 0–0.2)
BASOPHILS # BLD: 0.1 K/UL (ref 0–0.2)
BASOPHILS NFR BLD: 0 %
BASOPHILS NFR BLD: 0.5 %
BILIRUB SERPL-MCNC: 2.1 MG/DL (ref 0–1)
BILIRUB SERPL-MCNC: 2.3 MG/DL (ref 0–1)
BILIRUB UR QL STRIP.AUTO: NEGATIVE
BUN SERPL-MCNC: 15 MG/DL (ref 7–20)
BUN SERPL-MCNC: 16 MG/DL (ref 7–20)
C DIFF TOX A+B STL QL IA: NORMAL
CALCIUM SERPL-MCNC: 7.7 MG/DL (ref 8.3–10.6)
CALCIUM SERPL-MCNC: 8.3 MG/DL (ref 8.3–10.6)
CHLORIDE SERPL-SCNC: 100 MMOL/L (ref 99–110)
CHLORIDE SERPL-SCNC: 97 MMOL/L (ref 99–110)
CLARITY UR: CLEAR
CO2 BLDA-SCNC: 24.9 MMOL/L
CO2 SERPL-SCNC: 24 MMOL/L (ref 21–32)
CO2 SERPL-SCNC: 25 MMOL/L (ref 21–32)
COHGB MFR BLDA: 0.4 % (ref 0–1.5)
COLOR UR: YELLOW
CREAT SERPL-MCNC: 0.9 MG/DL (ref 0.8–1.3)
CREAT SERPL-MCNC: 0.9 MG/DL (ref 0.8–1.3)
DEPRECATED RDW RBC AUTO: 13.6 % (ref 12.4–15.4)
DEPRECATED RDW RBC AUTO: 13.6 % (ref 12.4–15.4)
EOSINOPHIL # BLD: 0 K/UL (ref 0–0.6)
EOSINOPHIL # BLD: 0 K/UL (ref 0–0.6)
EOSINOPHIL NFR BLD: 0.1 %
EOSINOPHIL NFR BLD: 0.1 %
EPI CELLS #/AREA URNS HPF: ABNORMAL /HPF (ref 0–5)
GFR SERPLBLD CREATININE-BSD FMLA CKD-EPI: >90 ML/MIN/{1.73_M2}
GFR SERPLBLD CREATININE-BSD FMLA CKD-EPI: >90 ML/MIN/{1.73_M2}
GLUCOSE BLD-MCNC: 122 MG/DL (ref 70–99)
GLUCOSE BLD-MCNC: 124 MG/DL (ref 70–99)
GLUCOSE BLD-MCNC: 126 MG/DL (ref 70–99)
GLUCOSE BLD-MCNC: 129 MG/DL (ref 70–99)
GLUCOSE BLD-MCNC: 140 MG/DL (ref 70–99)
GLUCOSE SERPL-MCNC: 125 MG/DL (ref 70–99)
GLUCOSE SERPL-MCNC: 133 MG/DL (ref 70–99)
GLUCOSE UR STRIP.AUTO-MCNC: 250 MG/DL
HCO3 BLDA-SCNC: 23.7 MMOL/L (ref 21–29)
HCT VFR BLD AUTO: 33.2 % (ref 40.5–52.5)
HCT VFR BLD AUTO: 37 % (ref 40.5–52.5)
HGB BLD-MCNC: 11.4 G/DL (ref 13.5–17.5)
HGB BLD-MCNC: 12.2 G/DL (ref 13.5–17.5)
HGB BLDA-MCNC: 12 G/DL (ref 13.5–17.5)
HGB UR QL STRIP.AUTO: ABNORMAL
HYALINE CASTS #/AREA URNS LPF: ABNORMAL /LPF (ref 0–2)
KETONES UR STRIP.AUTO-MCNC: 40 MG/DL
LACTATE BLDV-SCNC: 1.1 MMOL/L (ref 0.4–2)
LACTATE BLDV-SCNC: 1.6 MMOL/L (ref 0.4–2)
LEUKOCYTE ESTERASE UR QL STRIP.AUTO: NEGATIVE
LYMPHOCYTES # BLD: 0.8 K/UL (ref 1–5.1)
LYMPHOCYTES # BLD: 1.2 K/UL (ref 1–5.1)
LYMPHOCYTES NFR BLD: 5.6 %
LYMPHOCYTES NFR BLD: 6.9 %
MAGNESIUM SERPL-MCNC: 1.5 MG/DL (ref 1.8–2.4)
MAGNESIUM SERPL-MCNC: 1.9 MG/DL (ref 1.8–2.4)
MCH RBC QN AUTO: 29.3 PG (ref 26–34)
MCH RBC QN AUTO: 29.9 PG (ref 26–34)
MCHC RBC AUTO-ENTMCNC: 33.1 G/DL (ref 31–36)
MCHC RBC AUTO-ENTMCNC: 34.4 G/DL (ref 31–36)
MCV RBC AUTO: 86.9 FL (ref 80–100)
MCV RBC AUTO: 88.6 FL (ref 80–100)
METHGB MFR BLDA: 0.3 %
MONOCYTES # BLD: 0.9 K/UL (ref 0–1.3)
MONOCYTES # BLD: 0.9 K/UL (ref 0–1.3)
MONOCYTES NFR BLD: 5.4 %
MONOCYTES NFR BLD: 6.7 %
MUCOUS THREADS #/AREA URNS LPF: ABNORMAL /LPF
NEUTROPHILS # BLD: 12 K/UL (ref 1.7–7.7)
NEUTROPHILS # BLD: 15.2 K/UL (ref 1.7–7.7)
NEUTROPHILS NFR BLD: 87.1 %
NEUTROPHILS NFR BLD: 87.6 %
NITRITE UR QL STRIP.AUTO: NEGATIVE
O2 THERAPY: ABNORMAL
PCO2 BLDA: 37.1 MMHG (ref 35–45)
PERFORMED ON: ABNORMAL
PH BLDA: 7.42 [PH] (ref 7.35–7.45)
PH UR STRIP.AUTO: 6.5 [PH] (ref 5–8)
PLATELET # BLD AUTO: 111 K/UL (ref 135–450)
PLATELET # BLD AUTO: 161 K/UL (ref 135–450)
PMV BLD AUTO: 10.5 FL (ref 5–10.5)
PMV BLD AUTO: 10.8 FL (ref 5–10.5)
PO2 BLDA: 65.1 MMHG (ref 75–108)
POTASSIUM SERPL-SCNC: 3.3 MMOL/L (ref 3.5–5.1)
POTASSIUM SERPL-SCNC: 3.5 MMOL/L (ref 3.5–5.1)
PROCALCITONIN SERPL IA-MCNC: 1.23 NG/ML (ref 0–0.15)
PROT SERPL-MCNC: 5.2 G/DL (ref 6.4–8.2)
PROT SERPL-MCNC: 6.5 G/DL (ref 6.4–8.2)
PROT UR STRIP.AUTO-MCNC: 100 MG/DL
RBC # BLD AUTO: 3.82 M/UL (ref 4.2–5.9)
RBC # BLD AUTO: 4.18 M/UL (ref 4.2–5.9)
RBC #/AREA URNS HPF: ABNORMAL /HPF (ref 0–4)
SAO2 % BLDA: 93.3 %
SODIUM SERPL-SCNC: 135 MMOL/L (ref 136–145)
SODIUM SERPL-SCNC: 138 MMOL/L (ref 136–145)
SP GR UR STRIP.AUTO: 1.02 (ref 1–1.03)
UA COMPLETE W REFLEX CULTURE PNL UR: ABNORMAL
UA DIPSTICK W REFLEX MICRO PNL UR: YES
URN SPEC COLLECT METH UR: ABNORMAL
UROBILINOGEN UR STRIP-ACNC: 0.2 E.U./DL
WBC # BLD AUTO: 13.7 K/UL (ref 4–11)
WBC # BLD AUTO: 17.5 K/UL (ref 4–11)
WBC #/AREA URNS HPF: ABNORMAL /HPF (ref 0–5)

## 2024-08-21 PROCEDURE — 6370000000 HC RX 637 (ALT 250 FOR IP): Performed by: INTERNAL MEDICINE

## 2024-08-21 PROCEDURE — 6370000000 HC RX 637 (ALT 250 FOR IP): Performed by: NURSE PRACTITIONER

## 2024-08-21 PROCEDURE — 6360000002 HC RX W HCPCS: Performed by: NURSE PRACTITIONER

## 2024-08-21 PROCEDURE — 36415 COLL VENOUS BLD VENIPUNCTURE: CPT

## 2024-08-21 PROCEDURE — 85025 COMPLETE CBC W/AUTO DIFF WBC: CPT

## 2024-08-21 PROCEDURE — 87324 CLOSTRIDIUM AG IA: CPT

## 2024-08-21 PROCEDURE — 81001 URINALYSIS AUTO W/SCOPE: CPT

## 2024-08-21 PROCEDURE — 99233 SBSQ HOSP IP/OBS HIGH 50: CPT | Performed by: NURSE PRACTITIONER

## 2024-08-21 PROCEDURE — 83735 ASSAY OF MAGNESIUM: CPT

## 2024-08-21 PROCEDURE — 74019 RADEX ABDOMEN 2 VIEWS: CPT

## 2024-08-21 PROCEDURE — 1200000000 HC SEMI PRIVATE

## 2024-08-21 PROCEDURE — 94640 AIRWAY INHALATION TREATMENT: CPT

## 2024-08-21 PROCEDURE — 84145 PROCALCITONIN (PCT): CPT

## 2024-08-21 PROCEDURE — 71046 X-RAY EXAM CHEST 2 VIEWS: CPT

## 2024-08-21 PROCEDURE — 82803 BLOOD GASES ANY COMBINATION: CPT

## 2024-08-21 PROCEDURE — 94761 N-INVAS EAR/PLS OXIMETRY MLT: CPT

## 2024-08-21 PROCEDURE — 2580000003 HC RX 258: Performed by: NURSE PRACTITIONER

## 2024-08-21 PROCEDURE — 36600 WITHDRAWAL OF ARTERIAL BLOOD: CPT

## 2024-08-21 PROCEDURE — 6360000004 HC RX CONTRAST MEDICATION: Performed by: NURSE PRACTITIONER

## 2024-08-21 PROCEDURE — 87040 BLOOD CULTURE FOR BACTERIA: CPT

## 2024-08-21 PROCEDURE — 87449 NOS EACH ORGANISM AG IA: CPT

## 2024-08-21 PROCEDURE — 87506 IADNA-DNA/RNA PROBE TQ 6-11: CPT

## 2024-08-21 PROCEDURE — 83605 ASSAY OF LACTIC ACID: CPT

## 2024-08-21 PROCEDURE — 2580000003 HC RX 258: Performed by: INTERNAL MEDICINE

## 2024-08-21 PROCEDURE — 6360000002 HC RX W HCPCS: Performed by: INTERNAL MEDICINE

## 2024-08-21 PROCEDURE — 80053 COMPREHEN METABOLIC PANEL: CPT

## 2024-08-21 PROCEDURE — 74177 CT ABD & PELVIS W/CONTRAST: CPT

## 2024-08-21 RX ORDER — POTASSIUM CHLORIDE 1500 MG/1
40 TABLET, EXTENDED RELEASE ORAL ONCE
Status: COMPLETED | OUTPATIENT
Start: 2024-08-21 | End: 2024-08-21

## 2024-08-21 RX ORDER — MAGNESIUM SULFATE IN WATER 40 MG/ML
2000 INJECTION, SOLUTION INTRAVENOUS ONCE
Status: COMPLETED | OUTPATIENT
Start: 2024-08-21 | End: 2024-08-21

## 2024-08-21 RX ORDER — IPRATROPIUM BROMIDE AND ALBUTEROL SULFATE 2.5; .5 MG/3ML; MG/3ML
1 SOLUTION RESPIRATORY (INHALATION)
Status: DISCONTINUED | OUTPATIENT
Start: 2024-08-21 | End: 2024-08-21

## 2024-08-21 RX ORDER — IPRATROPIUM BROMIDE AND ALBUTEROL SULFATE 2.5; .5 MG/3ML; MG/3ML
1 SOLUTION RESPIRATORY (INHALATION) EVERY 4 HOURS PRN
Status: DISCONTINUED | OUTPATIENT
Start: 2024-08-21 | End: 2024-08-23

## 2024-08-21 RX ORDER — OXYCODONE HYDROCHLORIDE 5 MG/1
10 TABLET ORAL EVERY 4 HOURS PRN
Status: DISCONTINUED | OUTPATIENT
Start: 2024-08-21 | End: 2024-08-23 | Stop reason: HOSPADM

## 2024-08-21 RX ORDER — IOPAMIDOL 755 MG/ML
75 INJECTION, SOLUTION INTRAVASCULAR
Status: COMPLETED | OUTPATIENT
Start: 2024-08-21 | End: 2024-08-21

## 2024-08-21 RX ORDER — OXYCODONE HYDROCHLORIDE 5 MG/1
5 TABLET ORAL EVERY 4 HOURS PRN
Status: DISCONTINUED | OUTPATIENT
Start: 2024-08-21 | End: 2024-08-23 | Stop reason: HOSPADM

## 2024-08-21 RX ORDER — IPRATROPIUM BROMIDE AND ALBUTEROL SULFATE 2.5; .5 MG/3ML; MG/3ML
1 SOLUTION RESPIRATORY (INHALATION)
Status: DISCONTINUED | OUTPATIENT
Start: 2024-08-21 | End: 2024-08-23

## 2024-08-21 RX ADMIN — ONDANSETRON 4 MG: 4 TABLET, ORALLY DISINTEGRATING ORAL at 05:35

## 2024-08-21 RX ADMIN — ENOXAPARIN SODIUM 30 MG: 100 INJECTION SUBCUTANEOUS at 08:17

## 2024-08-21 RX ADMIN — PANTOPRAZOLE SODIUM 40 MG: 40 INJECTION, POWDER, FOR SOLUTION INTRAVENOUS at 08:17

## 2024-08-21 RX ADMIN — SODIUM CHLORIDE, POTASSIUM CHLORIDE, SODIUM LACTATE AND CALCIUM CHLORIDE: 600; 310; 30; 20 INJECTION, SOLUTION INTRAVENOUS at 03:07

## 2024-08-21 RX ADMIN — VANCOMYCIN HYDROCHLORIDE 2000 MG: 1 INJECTION, POWDER, LYOPHILIZED, FOR SOLUTION INTRAVENOUS at 16:59

## 2024-08-21 RX ADMIN — MAGNESIUM SULFATE HEPTAHYDRATE 2000 MG: 40 INJECTION, SOLUTION INTRAVENOUS at 11:52

## 2024-08-21 RX ADMIN — ENOXAPARIN SODIUM 30 MG: 100 INJECTION SUBCUTANEOUS at 22:01

## 2024-08-21 RX ADMIN — Medication 10 ML: at 22:03

## 2024-08-21 RX ADMIN — IOPAMIDOL 75 ML: 755 INJECTION, SOLUTION INTRAVENOUS at 16:01

## 2024-08-21 RX ADMIN — IPRATROPIUM BROMIDE AND ALBUTEROL SULFATE 1 DOSE: 2.5; .5 SOLUTION RESPIRATORY (INHALATION) at 19:38

## 2024-08-21 RX ADMIN — CEFEPIME 2000 MG: 2 INJECTION, POWDER, FOR SOLUTION INTRAVENOUS at 16:23

## 2024-08-21 RX ADMIN — POTASSIUM CHLORIDE 40 MEQ: 1500 TABLET, EXTENDED RELEASE ORAL at 16:24

## 2024-08-21 ASSESSMENT — PAIN SCALES - GENERAL
PAINLEVEL_OUTOF10: 3
PAINLEVEL_OUTOF10: 0

## 2024-08-21 ASSESSMENT — PAIN - FUNCTIONAL ASSESSMENT: PAIN_FUNCTIONAL_ASSESSMENT: ACTIVITIES ARE NOT PREVENTED

## 2024-08-21 ASSESSMENT — PAIN DESCRIPTION - DESCRIPTORS: DESCRIPTORS: ACHING

## 2024-08-21 ASSESSMENT — PAIN DESCRIPTION - LOCATION: LOCATION: ABDOMEN

## 2024-08-21 ASSESSMENT — PAIN DESCRIPTION - ORIENTATION: ORIENTATION: MID

## 2024-08-21 NOTE — PROGRESS NOTES
Bedside report given to gaudencio WINKLER. Pt denies needs at this time. No s/s of distress. Respirations easy and unlabored. Pt stable. Bed in low position call light within reach. No further needs at this time.  Pt denies pain or SOB.

## 2024-08-21 NOTE — PROGRESS NOTES
NP notified of pt seeming a bit drowsy at this time. NP assessed pt at bedside and ordered CT scan, antibiotics and lab work. Pt placed on telemetry as well. VSS.

## 2024-08-21 NOTE — PROGRESS NOTES
Pt states a pain level of 6 on a 0-10 pain scale. Pt given PRN Morphine. Administration can be found in the MAR.

## 2024-08-21 NOTE — FLOWSHEET NOTE
08/21/24 0305   Vital Signs   Temp 98.4 °F (36.9 °C)   Temp Source Oral   Pulse 100   Heart Rate Source Monitor   Respirations 18   BP (!) 166/98   MAP (Calculated) 121   BP Location Right upper arm   BP Method Automatic   Patient Position Semi fowlers   Pain Assessment   Pain Assessment None - Denies Pain   Pain Level 0   Opioid-Induced Sedation   POSS Score 1   Oxygen Therapy   SpO2 95 %   O2 Device None (Room air)   O2 Flow Rate (L/min) 0 L/min     Pt VS as shown above.

## 2024-08-21 NOTE — PROGRESS NOTES
PROGRESS NOTE  S:74 yrs Patient  admitted on 8/18/2024 with Acute pancreatitis without infection or necrosis [K85.90]  Acute pancreatitis, unspecified complication status, unspecified pancreatitis type [K85.90] .  Today he complains of Diarrhea and abdominal bloating with cramps. Abd pain is improving. Requiring less IV pain meds    Exam:   Vitals:    08/21/24 0800   BP: (!) 142/84   Pulse: 88   Resp: 16   Temp: 99.5 °F (37.5 °C)   SpO2: 93%     General : alert, appears stated age, and cooperative  Head: Normocephalic, without obvious abnormality  Eyes:  no icterus  Neck: supple, symmetrical, trachea midline  Heart: regular rate and rhythm  Abdomen:  soft, tender, distended  Extremities:  no edema    Medications: Reviewed    Labs:  CBC:   Recent Labs     08/19/24  1059 08/20/24  0618 08/21/24  0645   WBC 10.9 15.1* 13.7*   HGB 12.6* 11.5* 11.4*   HCT 37.4* 34.5* 33.2*   MCV 87.9 88.4 86.9    118* 111*     BMP:   Recent Labs     08/19/24  1059 08/20/24  0618 08/21/24  0645    139 138   K 4.2 4.1 3.5    104 100   CO2 27 26 25   BUN 18 19 16   CREATININE 1.1 1.2 0.9     LIVER PROFILE:   Recent Labs     08/19/24  0007 08/19/24  1059 08/20/24  0618 08/21/24  0645   * 254* 86* 48*   * 181* 105* 71*   LIPASE >3,000.0*  --   --   --    BILITOT 1.8* 2.9* 2.3* 2.3*   ALKPHOS 121 110 88 77     Impression:74 year old male with history of HTN, HLD, DM, CHF, CVA, COPD, PVD s/p carotid endarcterectomy, nephrolithiasis, colon cancer s/p R hemicolectomy (08/20), depression, anemia, biliary pancreatitis s/p cholecystectomy(05/2022) admitted with acute pancreatitis of unknown etiology     Recommendation:  Continue supportive care  Start clear diet  Transition to oral pain regimen  Check stool tests  Await IgG4  Monitor LFTs  Advance to low fat as tolerated  Up in chair  May need outpatient EUS in 4-6wks        Chris Cifuentes MD, MD  2:19 PM 8/21/2024

## 2024-08-21 NOTE — PROGRESS NOTES
BP (!) 152/69   Pulse 99   Temp 98.9 °F (37.2 °C) (Oral)   Resp 18   Ht 1.753 m (5' 9\")   Wt 104.2 kg (229 lb 11.2 oz)   SpO2 96%   BMI 33.92 kg/m²     Pt awake in bed. Pt alert and orientedX4. Pt denies pain at this time. Assessment complete. Meds passed. Pt denies needs at this time.        Bedside Mobility Assessment Tool (BMAT):     Assessment Level 1- Sit and Shake    1. From a semi-reclined position, ask patient to sit up and rotate to a seated position at the side of the bed. Can use the bedrail.    2. Ask patient to reach out and grab your hand and shake making sure patient reaches across his/her midline.   Pass- Patient is able to come to a seated position, maintain core strength. Maintains seated balance while reaching across midline. Move on to Assessment Level 2.     Assessment Level 2- Stretch and Point   1. With patient in seated position at the side of the bed, have patient place both feet on the floor (or stool) with knees no higher than hips.    2. Ask patient to stretch one leg and straighten the knee, then bend the ankle/flex and point the toes. If appropriate, repeat with the other leg.   Pass- Patient is able to demonstrate appropriate quad strength on intended weight bearing limb(s). Move onto Assessment Level 3.     Assessment Level 3- Stand   1. Ask patient to elevate off the bed or chair (seated to standing) using an assistive device (cane, bedrail).    2. Patient should be able to raise buttocks off be and hold for a count of five. May repeat once.   Pass- Patient maintains standing stability for at least 5 seconds, proceed to assessment level 4.    Assessment Level 4- Walk   1. Ask patient to march in place at bedside.    2. Then ask patient to advance step and return each foot. Some medical conditions may render a patient from stepping backwards, use your best clinical judgement.   Pass- Patient demonstrates balance while shifting weight and ability to step, takes independent

## 2024-08-21 NOTE — PROGRESS NOTES
exposed extremities. No rash on exposed extremities.   M/S: No cyanosis. No joint deformity. No clubbing.   Neuro: Awake. Grossly nonfocal    Psych: Oriented x 3. No anxiety or agitation      Lab Data:  CBC:   Recent Labs     08/19/24  1059 08/20/24  0618 08/21/24  0645   WBC 10.9 15.1* 13.7*   RBC 4.25 3.90* 3.82*   HGB 12.6* 11.5* 11.4*   HCT 37.4* 34.5* 33.2*   MCV 87.9 88.4 86.9   RDW 14.0 14.3 13.6    118* 111*     BMP:   Recent Labs     08/19/24  1059 08/20/24  0618 08/21/24  0645    139 138   K 4.2 4.1 3.5    104 100   CO2 27 26 25   BUN 18 19 16   CREATININE 1.1 1.2 0.9     BNP: No results for input(s): \"BNP\" in the last 72 hours.  PT/INR: No results for input(s): \"PROTIME\", \"INR\" in the last 72 hours.  APTT:No results for input(s): \"APTT\" in the last 72 hours.  CARDIAC ENZYMES: No results for input(s): \"CKMB\", \"CKMBINDEX\", \"TROPONINI\" in the last 72 hours.    Invalid input(s): \"CKTOTAL;3\"  FASTING LIPID PANEL:  Lab Results   Component Value Date/Time    CHOL 95 09/06/2021 06:18 AM    HDL 26 09/06/2021 06:18 AM    HDL 27 02/23/2012 05:35 AM    TRIG 74 08/19/2024 10:59 AM     LIVER PROFILE:   Recent Labs     08/19/24  1059 08/20/24  0618 08/21/24  0645   * 86* 48*   * 105* 71*   BILITOT 2.9* 2.3* 2.3*   ALKPHOS 110 88 77         Cultures  Results       Procedure Component Value Units Date/Time    GI Bacterial Pathogens By PCR [[id number]]     Order Status: No result Specimen: Stool     Clostridium difficile toxin/antigen [[id number]]     Order Status: No result Specimen: Stool             Radiology   XR CHEST (2 VW)   Final Result   No acute process.         XR ABDOMEN (2 VIEWS)   Final Result   Gaseous distension of the colon without evidence of bowel obstruction         MRI ABDOMEN WO CONTRAST MRCP   Final Result   Surgically absent gallbladder.  No significant biliary ductal dilatation.   There is fatty infiltration liver and a liver cyst      Findings of acute

## 2024-08-21 NOTE — PLAN OF CARE
Problem: Discharge Planning  Goal: Discharge to home or other facility with appropriate resources  Outcome: Progressing     Problem: Pain  Goal: Verbalizes/displays adequate comfort level or baseline comfort level  8/20/2024 2148 by Anne Marie Arellano RN  Outcome: Progressing  8/20/2024 1637 by Ashwini Schilling, RN  Outcome: Progressing  Flowsheets (Taken 8/20/2024 1637)  Verbalizes/displays adequate comfort level or baseline comfort level:   Encourage patient to monitor pain and request assistance   Assess pain using appropriate pain scale   Administer analgesics based on type and severity of pain and evaluate response   Implement non-pharmacological measures as appropriate and evaluate response     Problem: Chronic Conditions and Co-morbidities  Goal: Patient's chronic conditions and co-morbidity symptoms are monitored and maintained or improved  Outcome: Progressing

## 2024-08-21 NOTE — PROGRESS NOTES
[facility]   Pharmacy Pharmacokinetic Monitoring Service - Vancomycin     Juan Guidry is a 74 y.o. male starting on vancomycin therapy for Sepsis for 7 days. Pharmacy consulted by ARY Anaya for monitoring and adjustment.    Target Concentration: Goal AUC/KEEGAN 400-600 mg*hr/L    Additional Antimicrobials: cefepime    Pertinent Laboratory Values:   Wt Readings from Last 1 Encounters:   08/19/24 104.2 kg (229 lb 11.2 oz)     Temp Readings from Last 1 Encounters:   08/21/24 99 °F (37.2 °C) (Oral)     Estimated Creatinine Clearance: 88 mL/min (based on SCr of 0.9 mg/dL).  Recent Labs     08/21/24  0645 08/21/24  1548   CREATININE 0.9 0.9   BUN 16 15   WBC 13.7* 17.5*     Procalcitonin: 1.23    Pertinent Cultures:  Culture Date Source Results        MRSA Nasal Swab: not ordered. Order placed by pharmacy.    Plan:  Dosing recommendations based on Bayesian software  Start vancomycin 2000mg times one, then 1000mg q12h  Anticipated AUC of 467 and trough concentration of 14.3 at steady state  Renal labs as indicated   Vancomycin concentration ordered for 8/22 @ 1600   Pharmacy will continue to monitor patient and adjust therapy as indicated    Thank you for the consult,  Amina Hickman RPH  8/21/2024 4:14 PM

## 2024-08-21 NOTE — PROGRESS NOTES
Dr. Cifuentes from GI in to see the pt at this time. Stated pt should be switched to oral pain meds and trial clear liquid diet. Also stated pt would benefit from being up in chair. Pt now up in chair. Np notified of GI recommendations.

## 2024-08-21 NOTE — FLOWSHEET NOTE
08/21/24 0800   Vital Signs   Temp 99.5 °F (37.5 °C)   Temp Source Oral   Pulse 88   Heart Rate Source Monitor   Respirations 16   BP (!) 142/84   MAP (Calculated) 103   Patient Position Semi fowlers   Pain Assessment   Pain Assessment None - Denies Pain   Oxygen Therapy   SpO2 93 %   O2 Device None (Room air)       Shift assessment complete. See flow sheet. Scheduled meds given. See MAR.  Patients head-toe complete, VS are logged, and active bowel sound noted in all four quadrants.    Pt sitting up in bed respirations easy and unlabored. No s/s of distress. Alert and oriented denies pain or SOB pt stable    No further needs  noted at this time. Call light and bedside table are within reach. The bed is locked and is in the lowest position.        Hugo Ridley RN

## 2024-08-22 LAB
ALBUMIN SERPL-MCNC: 2.9 G/DL (ref 3.4–5)
ALBUMIN/GLOB SERPL: 0.9 {RATIO} (ref 1.1–2.2)
ALP SERPL-CCNC: 93 U/L (ref 40–129)
ALT SERPL-CCNC: 53 U/L (ref 10–40)
ANION GAP SERPL CALCULATED.3IONS-SCNC: 12 MMOL/L (ref 3–16)
AST SERPL-CCNC: 32 U/L (ref 15–37)
BASOPHILS # BLD: 0 K/UL (ref 0–0.2)
BASOPHILS NFR BLD: 0.2 %
BILIRUB SERPL-MCNC: 1.2 MG/DL (ref 0–1)
BUN SERPL-MCNC: 15 MG/DL (ref 7–20)
CALCIUM SERPL-MCNC: 7.9 MG/DL (ref 8.3–10.6)
CHLORIDE SERPL-SCNC: 105 MMOL/L (ref 99–110)
CO2 SERPL-SCNC: 21 MMOL/L (ref 21–32)
CREAT SERPL-MCNC: 0.8 MG/DL (ref 0.8–1.3)
DEPRECATED RDW RBC AUTO: 14.1 % (ref 12.4–15.4)
EOSINOPHIL # BLD: 0 K/UL (ref 0–0.6)
EOSINOPHIL NFR BLD: 0.3 %
GFR SERPLBLD CREATININE-BSD FMLA CKD-EPI: >90 ML/MIN/{1.73_M2}
GI PATHOGENS PNL STL NAA+PROBE: NORMAL
GLUCOSE BLD-MCNC: 137 MG/DL (ref 70–99)
GLUCOSE BLD-MCNC: 140 MG/DL (ref 70–99)
GLUCOSE BLD-MCNC: 142 MG/DL (ref 70–99)
GLUCOSE BLD-MCNC: 164 MG/DL (ref 70–99)
GLUCOSE BLD-MCNC: 195 MG/DL (ref 70–99)
GLUCOSE SERPL-MCNC: 139 MG/DL (ref 70–99)
HCT VFR BLD AUTO: 31.5 % (ref 40.5–52.5)
HGB BLD-MCNC: 10.6 G/DL (ref 13.5–17.5)
IGG4 SER-MCNC: 69 MG/DL (ref 1–123)
LYMPHOCYTES # BLD: 0.7 K/UL (ref 1–5.1)
LYMPHOCYTES NFR BLD: 5.8 %
MCH RBC QN AUTO: 29.9 PG (ref 26–34)
MCHC RBC AUTO-ENTMCNC: 33.7 G/DL (ref 31–36)
MCV RBC AUTO: 88.7 FL (ref 80–100)
MONOCYTES # BLD: 0.9 K/UL (ref 0–1.3)
MONOCYTES NFR BLD: 7.4 %
NEUTROPHILS # BLD: 10.3 K/UL (ref 1.7–7.7)
NEUTROPHILS NFR BLD: 86.3 %
PERFORMED ON: ABNORMAL
PLATELET # BLD AUTO: 126 K/UL (ref 135–450)
PMV BLD AUTO: 10.9 FL (ref 5–10.5)
POTASSIUM SERPL-SCNC: 3.6 MMOL/L (ref 3.5–5.1)
PROT SERPL-MCNC: 6.1 G/DL (ref 6.4–8.2)
RBC # BLD AUTO: 3.55 M/UL (ref 4.2–5.9)
SODIUM SERPL-SCNC: 138 MMOL/L (ref 136–145)
WBC # BLD AUTO: 12 K/UL (ref 4–11)

## 2024-08-22 PROCEDURE — 6370000000 HC RX 637 (ALT 250 FOR IP): Performed by: NURSE PRACTITIONER

## 2024-08-22 PROCEDURE — 99232 SBSQ HOSP IP/OBS MODERATE 35: CPT | Performed by: INTERNAL MEDICINE

## 2024-08-22 PROCEDURE — 94761 N-INVAS EAR/PLS OXIMETRY MLT: CPT

## 2024-08-22 PROCEDURE — 6360000002 HC RX W HCPCS: Performed by: NURSE PRACTITIONER

## 2024-08-22 PROCEDURE — 2580000003 HC RX 258: Performed by: NURSE PRACTITIONER

## 2024-08-22 PROCEDURE — 1200000000 HC SEMI PRIVATE

## 2024-08-22 PROCEDURE — 6370000000 HC RX 637 (ALT 250 FOR IP): Performed by: STUDENT IN AN ORGANIZED HEALTH CARE EDUCATION/TRAINING PROGRAM

## 2024-08-22 PROCEDURE — 85025 COMPLETE CBC W/AUTO DIFF WBC: CPT

## 2024-08-22 PROCEDURE — 6360000002 HC RX W HCPCS: Performed by: INTERNAL MEDICINE

## 2024-08-22 PROCEDURE — 36415 COLL VENOUS BLD VENIPUNCTURE: CPT

## 2024-08-22 PROCEDURE — 94640 AIRWAY INHALATION TREATMENT: CPT

## 2024-08-22 PROCEDURE — 6370000000 HC RX 637 (ALT 250 FOR IP): Performed by: INTERNAL MEDICINE

## 2024-08-22 PROCEDURE — 80053 COMPREHEN METABOLIC PANEL: CPT

## 2024-08-22 RX ORDER — PANTOPRAZOLE SODIUM 40 MG/1
40 TABLET, DELAYED RELEASE ORAL
Status: DISCONTINUED | OUTPATIENT
Start: 2024-08-23 | End: 2024-08-23 | Stop reason: HOSPADM

## 2024-08-22 RX ORDER — INSULIN LISPRO 100 [IU]/ML
0-4 INJECTION, SOLUTION INTRAVENOUS; SUBCUTANEOUS
Status: DISCONTINUED | OUTPATIENT
Start: 2024-08-23 | End: 2024-08-23 | Stop reason: HOSPADM

## 2024-08-22 RX ORDER — INSULIN LISPRO 100 [IU]/ML
0-4 INJECTION, SOLUTION INTRAVENOUS; SUBCUTANEOUS NIGHTLY
Status: DISCONTINUED | OUTPATIENT
Start: 2024-08-22 | End: 2024-08-23 | Stop reason: HOSPADM

## 2024-08-22 RX ORDER — LACTOBACILLUS RHAMNOSUS GG 10B CELL
1 CAPSULE ORAL
Status: DISCONTINUED | OUTPATIENT
Start: 2024-08-22 | End: 2024-08-23 | Stop reason: HOSPADM

## 2024-08-22 RX ORDER — SIMETHICONE 80 MG
80 TABLET,CHEWABLE ORAL EVERY 6 HOURS PRN
Status: DISCONTINUED | OUTPATIENT
Start: 2024-08-22 | End: 2024-08-23 | Stop reason: HOSPADM

## 2024-08-22 RX ADMIN — CEFEPIME 2000 MG: 2 INJECTION, POWDER, FOR SOLUTION INTRAVENOUS at 00:38

## 2024-08-22 RX ADMIN — CEFEPIME 2000 MG: 2 INJECTION, POWDER, FOR SOLUTION INTRAVENOUS at 08:32

## 2024-08-22 RX ADMIN — ENOXAPARIN SODIUM 30 MG: 100 INJECTION SUBCUTANEOUS at 19:35

## 2024-08-22 RX ADMIN — CEFEPIME 2000 MG: 2 INJECTION, POWDER, FOR SOLUTION INTRAVENOUS at 17:35

## 2024-08-22 RX ADMIN — Medication 1 CAPSULE: at 08:27

## 2024-08-22 RX ADMIN — IPRATROPIUM BROMIDE AND ALBUTEROL SULFATE 1 DOSE: 2.5; .5 SOLUTION RESPIRATORY (INHALATION) at 07:49

## 2024-08-22 RX ADMIN — IPRATROPIUM BROMIDE AND ALBUTEROL SULFATE 1 DOSE: 2.5; .5 SOLUTION RESPIRATORY (INHALATION) at 19:29

## 2024-08-22 RX ADMIN — ENOXAPARIN SODIUM 30 MG: 100 INJECTION SUBCUTANEOUS at 08:27

## 2024-08-22 RX ADMIN — VANCOMYCIN HYDROCHLORIDE 1000 MG: 1 INJECTION, POWDER, LYOPHILIZED, FOR SOLUTION INTRAVENOUS at 04:47

## 2024-08-22 RX ADMIN — PANTOPRAZOLE SODIUM 40 MG: 40 INJECTION, POWDER, FOR SOLUTION INTRAVENOUS at 08:26

## 2024-08-22 RX ADMIN — CEFEPIME 2000 MG: 2 INJECTION, POWDER, FOR SOLUTION INTRAVENOUS at 22:35

## 2024-08-22 RX ADMIN — OXYCODONE HYDROCHLORIDE 5 MG: 5 TABLET ORAL at 00:33

## 2024-08-22 ASSESSMENT — PAIN SCALES - GENERAL
PAINLEVEL_OUTOF10: 0
PAINLEVEL_OUTOF10: 6

## 2024-08-22 ASSESSMENT — PAIN DESCRIPTION - DESCRIPTORS: DESCRIPTORS: ACHING

## 2024-08-22 ASSESSMENT — PAIN - FUNCTIONAL ASSESSMENT: PAIN_FUNCTIONAL_ASSESSMENT: ACTIVITIES ARE NOT PREVENTED

## 2024-08-22 ASSESSMENT — PAIN DESCRIPTION - FREQUENCY: FREQUENCY: INTERMITTENT

## 2024-08-22 ASSESSMENT — PAIN DESCRIPTION - LOCATION: LOCATION: ABDOMEN

## 2024-08-22 ASSESSMENT — PAIN DESCRIPTION - PAIN TYPE: TYPE: ACUTE PAIN

## 2024-08-22 ASSESSMENT — PAIN DESCRIPTION - ORIENTATION: ORIENTATION: MID

## 2024-08-22 ASSESSMENT — PAIN DESCRIPTION - ONSET: ONSET: SUDDEN

## 2024-08-22 NOTE — PROGRESS NOTES
RT Inhaler-Nebulizer Bronchodilator Protocol Note    There is a bronchodilator order in the chart from a provider indicating to follow the RT Bronchodilator Protocol and there is an “Initiate RT Inhaler-Nebulizer Bronchodilator Protocol” order as well (see protocol at bottom of note).    CXR Findings:  No results found.    The findings from the last RT Protocol Assessment were as follows:   History Pulmonary Disease: Chronic pulmonary disease  Respiratory Pattern: Regular pattern and RR 12-20 bpm  Breath Sounds: Slightly diminished and/or crackles  Cough: Strong, spontaneous, non-productive  Indication for Bronchodilator Therapy: Decreased or absent breath sounds  Bronchodilator Assessment Score: 4    Aerosolized bronchodilator medication orders have been revised according to the RT Inhaler-Nebulizer Bronchodilator Protocol below.    Respiratory Therapist to perform RT Therapy Protocol Assessment initially then follow the protocol.  Repeat RT Therapy Protocol Assessment PRN for score 0-3 or on second treatment, BID, and PRN for scores above 3.    No Indications - adjust the frequency to every 6 hours PRN wheezing or bronchospasm, if no treatments needed after 48 hours then discontinue using Per Protocol order mode.     If indication present, adjust the RT bronchodilator orders based on the Bronchodilator Assessment Score as indicated below.  Use Inhaler orders unless patient has one or more of the following: on home nebulizer, not able to hold breath for 10 seconds, is not alert and oriented, cannot activate and use MDI correctly, or respiratory rate 25 breaths per minute or more, then use the equivalent nebulizer order(s) with same Frequency and PRN reasons based on the score.  If a patient is on this medication at home then do not decrease Frequency below that used at home.    0-3 - enter or revise RT bronchodilator order(s) to equivalent RT Bronchodilator order with Frequency of every 4 hours PRN for wheezing or  increased work of breathing using Per Protocol order mode.        4-6 - enter or revise RT Bronchodilator order(s) to two equivalent RT bronchodilator orders with one order with BID Frequency and one order with Frequency of every 4 hours PRN wheezing or increased work of breathing using Per Protocol order mode.        7-10 - enter or revise RT Bronchodilator order(s) to two equivalent RT bronchodilator orders with one order with TID Frequency and one order with Frequency of every 4 hours PRN wheezing or increased work of breathing using Per Protocol order mode.       11-13 - enter or revise RT Bronchodilator order(s) to one equivalent RT bronchodilator order with QID Frequency and an Albuterol order with Frequency of every 4 hours PRN wheezing or increased work of breathing using Per Protocol order mode.      Greater than 13 - enter or revise RT Bronchodilator order(s) to one equivalent RT bronchodilator order with every 4 hours Frequency and an Albuterol order with Frequency of every 2 hours PRN wheezing or increased work of breathing using Per Protocol order mode.       Electronically signed by Gopal Patino RCP on 8/22/2024 at 7:32 PM

## 2024-08-22 NOTE — PROGRESS NOTES
RT Inhaler-Nebulizer Bronchodilator Protocol Note    There is a bronchodilator order in the chart from a provider indicating to follow the RT Bronchodilator Protocol and there is an “Initiate RT Inhaler-Nebulizer Bronchodilator Protocol” order as well (see protocol at bottom of note).    CXR Findings:  No results found.    The findings from the last RT Protocol Assessment were as follows:   History Pulmonary Disease: Chronic pulmonary disease  Respiratory Pattern: Regular pattern and RR 12-20 bpm  Breath Sounds: Slightly diminished and/or crackles  Cough: Strong, spontaneous, non-productive  Indication for Bronchodilator Therapy: Decreased or absent breath sounds  Bronchodilator Assessment Score: 4    Aerosolized bronchodilator medication orders have been revised according to the RT Inhaler-Nebulizer Bronchodilator Protocol below.    Respiratory Therapist to perform RT Therapy Protocol Assessment initially then follow the protocol.  Repeat RT Therapy Protocol Assessment PRN for score 0-3 or on second treatment, BID, and PRN for scores above 3.    No Indications - adjust the frequency to every 6 hours PRN wheezing or bronchospasm, if no treatments needed after 48 hours then discontinue using Per Protocol order mode.     If indication present, adjust the RT bronchodilator orders based on the Bronchodilator Assessment Score as indicated below.  Use Inhaler orders unless patient has one or more of the following: on home nebulizer, not able to hold breath for 10 seconds, is not alert and oriented, cannot activate and use MDI correctly, or respiratory rate 25 breaths per minute or more, then use the equivalent nebulizer order(s) with same Frequency and PRN reasons based on the score.  If a patient is on this medication at home then do not decrease Frequency below that used at home.    0-3 - enter or revise RT bronchodilator order(s) to equivalent RT Bronchodilator order with Frequency of every 4 hours PRN for wheezing or  increased work of breathing using Per Protocol order mode.        4-6 - enter or revise RT Bronchodilator order(s) to two equivalent RT bronchodilator orders with one order with BID Frequency and one order with Frequency of every 4 hours PRN wheezing or increased work of breathing using Per Protocol order mode.        7-10 - enter or revise RT Bronchodilator order(s) to two equivalent RT bronchodilator orders with one order with TID Frequency and one order with Frequency of every 4 hours PRN wheezing or increased work of breathing using Per Protocol order mode.       11-13 - enter or revise RT Bronchodilator order(s) to one equivalent RT bronchodilator order with QID Frequency and an Albuterol order with Frequency of every 4 hours PRN wheezing or increased work of breathing using Per Protocol order mode.      Greater than 13 - enter or revise RT Bronchodilator order(s) to one equivalent RT bronchodilator order with every 4 hours Frequency and an Albuterol order with Frequency of every 2 hours PRN wheezing or increased work of breathing using Per Protocol order mode.     RT to enter RT Home Evaluation for COPD & MDI Assessment order using Per Protocol order mode.    Electronically signed by Lucy Ramos on 8/22/2024 at 7:52 AM

## 2024-08-22 NOTE — PROGRESS NOTES
Admit: 2024    Name:  Juan Guidry  [facility]  MRN:    [id number]    Daily Progress Note for 2024     Acute pancreatitis    Interval History:     Abd pain better   Tolerating clears   Scheduled Meds:   lactobacillus  1 capsule Oral Daily with breakfast    ipratropium 0.5 mg-albuterol 2.5 mg  1 Dose Inhalation BID RT    cefepime  2,000 mg IntraVENous Q8H    vancomycin  1,000 mg IntraVENous Q12H    insulin lispro  0-4 Units SubCUTAneous Q6H    sodium chloride flush  5-40 mL IntraVENous 2 times per day    enoxaparin  30 mg SubCUTAneous BID    pantoprazole (PROTONIX) 40 mg in sodium chloride (PF) 0.9 % 10 mL injection  40 mg IntraVENous Daily       Continuous Infusions:   sodium chloride      dextrose         PRN Meds:  simethicone, ipratropium 0.5 mg-albuterol 2.5 mg, oxyCODONE **OR** oxyCODONE, sodium chloride flush, sodium chloride, potassium chloride **OR** potassium chloride, magnesium sulfate, ondansetron **OR** ondansetron, glucose, dextrose bolus **OR** dextrose bolus, glucagon (rDNA), dextrose, hydrALAZINE                  Objective:     Temp  Av.6 °F (37 °C)  Min: 98.2 °F (36.8 °C)  Max: 99 °F (37.2 °C)  Pulse  Av.6  Min: 76  Max: 103  BP  Min: 125/64  Max: 162/76  SpO2  Av %  Min: 94 %  Max: 97 %  No data found.        Intake/Output Summary (Last 24 hours) at 2024 1229  Last data filed at 2024 0831  Gross per 24 hour   Intake 1110 ml   Output --   Net 1110 ml       Physical Exam:  Gen: No distress. Alert.   Eyes: PERRL. No sclera icterus. No conjunctival injection.   ENT: No discharge. Pharynx clear.   Neck: Trachea midline.  Resp: No accessory muscle use. No crackles. + wheezes. No rhonchi.   CV: Regular rate. Regular rhythm. No murmur. No rub. Trace edema.   Capillary Refill: Brisk,< 3 seconds   Peripheral Pulses: +2 palpable, equal bilaterally   GI: Non-tender. +distended. Hypo bowel sounds. No hernia.   Skin: Warm and dry. No nodule on exposed extremities.  ORDER#: [id number]                          ORDERED BY: AGUILA AGUILAR  SOURCE: Stool                              COLLECTED:  08/21/24 10:40  ANTIBIOTICS AT [place].:                      RECEIVED :  08/21/24 15:36    Clostridium difficile toxin/antigen [[id number]] Collected: 08/21/24 1011    Order Status: Completed Specimen: Stool Updated: 08/21/24 1419     C.diff Toxin/Antigen --     Negative for Clostridium difficile antigen and toxin  Normal Range: Negative      Narrative:      ORDER#: [id number]                          ORDERED BY: AGUILA AGUILAR  SOURCE: Stool                              COLLECTED:  08/21/24 10:11  ANTIBIOTICS AT [place].:                      RECEIVED :  08/21/24 13:38  Collect White vial (sterile container)            Radiology   CT ABDOMEN PELVIS W IV CONTRAST Additional Contrast? None   Final Result   1. Findings consistent with acute pancreatitis. No organized fluid   collections.   2. Interval development of a small amount of intra-abdominal and pelvic   ascites.   3. Mildly dilated fluid-filled loops of small bowel throughout the abdomen   most consistent with an ileus. This is slightly progressed since the prior   exam.   4. Fatty infiltration of the liver.         XR CHEST (2 VW)   Final Result   No acute process.         XR ABDOMEN (2 VIEWS)   Final Result   Gaseous distension of the colon without evidence of bowel obstruction         MRI ABDOMEN WO CONTRAST MRCP   Final Result   Surgically absent gallbladder.  No significant biliary ductal dilatation.   There is fatty infiltration liver and a liver cyst      Findings of acute pancreatitis.  No pancreatic ductal dilatation.  No   significant change in peripancreatic fluid and edema compared to prior CT   scan 08/19/2024      RECOMMENDATIONS:   Bosniak I/Bosniak II benign renal cyst. No follow-up imaging is recommended.      [employer] 2021; [id number], Bosniak Classification of Cystic Renal Masses,   Version 2019.            CT  ABDOMEN PELVIS WO CONTRAST Additional Contrast? None   Final Result   1. Acute pancreatitis. No organized pancreatic fluid collection/pseudocyst.   2. Fatty liver.   3. Remote right hemicolectomy.   4. Mild localized ileus in the mid abdomen.   5. Few scattered left colon diverticula with no evidence of acute   diverticulitis.               Assessment & Plan:     Patient Active Problem List    Diagnosis Date Noted    Cerebral infarction (HCC) 02/22/2012    Leukocytosis 08/21/2024    Abdominal distention 08/21/2024    Type 2 diabetes mellitus without complication, without long-term current use of insulin (HCC) 08/20/2024    Primary hypertension 08/20/2024    Acute pancreatitis 08/19/2024    Disorder of portal venous system     Right arm weakness 09/07/2021    TIA (transient ischemic attack) 09/05/2021     #Acute pancreatitis  ? Cause   -admitted to med-surg.   -NPO, IVFs. Should be able to start Clear liquids. Stopped IVF's.   -pain control: Morphine prn  -antiemetics as needed.  -check Triglyceride level- normal.  -GI Consulted  MRI unremarkable   IGg4 levels pending  Repeat CT reviewed   Advance to full liquid diet   On cefepime      #Lactic acidosis  -due to above  -Trend lactic. resolved     #Elevated LFTs  -monitor LFTs.   improving     #Leukocytosis  -related to above  -NPO, IVFs.  Monitor CBC  -check CXR, UA, stool studies.  -procal elevated.   Resolving     #Diarrhea  -send stool studies.      #DM type 2  -monitor glucose. SSI coverage     #Hypertension  -BP elevated.  -Hydralazine prn     #COPD  -CXR today negative  -added Duonebs.     #Hyperlipidemia  -not taking statin.     #GERD  -IV PPI For now.     #Tobacco Dependence  -Recommended cessation     DVT Prophylaxis: Lovenox  Code status: Full Code  Diet: Ful liquid     ELIUD LONGO MD  8/22/2024

## 2024-08-22 NOTE — CARE COORDINATION
Reviewed chart, met with pt bedside. Pt plan on returning home with family when medically stable. Watch for IV abx. Will continue to monitor.

## 2024-08-22 NOTE — PLAN OF CARE
Problem: Discharge Planning  Goal: Discharge to home or other facility with appropriate resources  8/22/2024 0437 by Brandie Weir RN  Outcome: Progressing  Flowsheets (Taken 8/21/2024 2116)  Discharge to home or other facility with appropriate resources: Identify barriers to discharge with patient and caregiver  8/21/2024 1824 by Hugo Ridley RN  Outcome: Progressing  8/21/2024 1716 by Hugo Ridley RN  Outcome: Progressing     Problem: Pain  Goal: Verbalizes/displays adequate comfort level or baseline comfort level  8/22/2024 0437 by Brandie Weir RN  Outcome: Progressing  Flowsheets (Taken 8/21/2024 2200)  Verbalizes/displays adequate comfort level or baseline comfort level: Assess pain using appropriate pain scale  8/21/2024 1824 by Hugo Ridley RN  Outcome: Progressing  8/21/2024 1716 by Hugo Ridley RN  Outcome: Progressing     Problem: Chronic Conditions and Co-morbidities  Goal: Patient's chronic conditions and co-morbidity symptoms are monitored and maintained or improved  8/22/2024 0437 by Brandie Weir RN  Outcome: Progressing  Flowsheets (Taken 8/21/2024 2116)  Care Plan - Patient's Chronic Conditions and Co-Morbidity Symptoms are Monitored and Maintained or Improved: Monitor and assess patient's chronic conditions and comorbid symptoms for stability, deterioration, or improvement  8/21/2024 1824 by Hugo Ridley RN  Outcome: Progressing  8/21/2024 1716 by Hugo Ridley RN  Outcome: Progressing     Problem: Safety - Adult  Goal: Free from fall injury  Outcome: Progressing  Flowsheets (Taken 8/22/2024 0437)  Free From Fall Injury: Instruct family/caregiver on patient safety     Problem: Gastrointestinal - Adult  Goal: Minimal or absence of nausea and vomiting  Outcome: Progressing  Flowsheets (Taken 8/21/2024 2116)  Minimal or absence of nausea and vomiting: Provide nonpharmacologic comfort measures as appropriate     Problem: Metabolic/Fluid and Electrolytes - Adult  Goal:

## 2024-08-22 NOTE — FLOWSHEET NOTE
08/22/24 0815   Vital Signs   Temp 98.3 °F (36.8 °C)   Temp Source Oral   Pulse 82   Heart Rate Source Monitor   Respirations 16   /75   MAP (Calculated) 95   Patient Position Semi fowlers   Pain Assessment   Pain Assessment None - Denies Pain   Oxygen Therapy   SpO2 95 %   O2 Device None (Room air)       Shift assessment complete. See flow sheet. Scheduled meds given. See MAR.  Patients head-toe complete, VS are logged, and active bowel sound noted in all four quadrants.    Pt sitting up in bed respirations easy and unlabored. No s/s of distress. Alert and oriented denies pain or SOB pt stable    No further needs  noted at this time. Call light and bedside table are within reach. The bed is locked and is in the lowest position.        Hugo Ridley RN

## 2024-08-22 NOTE — PROGRESS NOTES
Bedside report given to Jeninfer WINKLER. Pt denies needs at this time. No s/s of distress. Respirations easy and unlabored. Pt stable. Bed in low position call light within reach. No further needs at this time.

## 2024-08-22 NOTE — PROGRESS NOTES
PROGRESS NOTE  S:74 yrs Patient  admitted on 8/18/2024 with Acute pancreatitis without infection or necrosis [K85.90]  Acute pancreatitis, unspecified complication status, unspecified pancreatitis type [K85.90] .  Today he feels better. Continues to have diarrhea. Abd pain is improving.    Exam:   Vitals:    08/22/24 1430   BP: 135/72   Pulse: 83   Resp: 16   Temp: 98.2 °F (36.8 °C)   SpO2: 95%     General : alert, appears stated age, and cooperative  Head: Normocephalic, without obvious abnormality  Eyes:  no icterus  Neck: supple, symmetrical, trachea midline  Heart: regular rate and rhythm  Abdomen:  soft, non tender, non distended  Extremities:  no edema    Medications: Reviewed    Labs:  CBC:   Recent Labs     08/21/24  0645 08/21/24  1548 08/22/24  0626   WBC 13.7* 17.5* 12.0*   HGB 11.4* 12.2* 10.6*   HCT 33.2* 37.0* 31.5*   MCV 86.9 88.6 88.7   * 161 126*     BMP:   Recent Labs     08/21/24  0645 08/21/24  1548 08/22/24  0626    135* 138   K 3.5 3.3* 3.6    97* 105   CO2 25 24 21   BUN 16 15 15   CREATININE 0.9 0.9 0.8     LIVER PROFILE:   Recent Labs     08/21/24  0645 08/21/24  1548 08/22/24  0626   AST 48* 47* 32   ALT 71* 68* 53*   BILITOT 2.3* 2.1* 1.2*   ALKPHOS 77 87 93       Impression:74 year old male with history of HTN, HLD, DM, CHF, CVA, COPD, PVD s/p carotid endarcterectomy, nephrolithiasis, colon cancer s/p R hemicolectomy (08/20), depression, anemia, biliary pancreatitis s/p cholecystectomy(05/2022) admitted with acute pancreatitis of unknown etiology     Recommendation:  Continue supportive care  Advance to full liquids  Advance to low fat tomorrow as tolerated  Continue oral pain regimen  Check stool tests  IgG4 is normal range  Monitor LFTs  Up in chair  May need outpatient EUS in 4-6wks      Chris Cifuentes MD, MD  4:57 PM 8/22/2024

## 2024-08-22 NOTE — PROGRESS NOTES
pt. states he thinks he has cdiff but we checked him and it was negative. I encouraged him to eat Activia yogurt and take a probiotic to help combat getting cdiff. He c/o having a lot of gas and his stomach keeps turning. Can we get a probiotic ordered daily and maybe a prn simethicone tablet for gas? He states he's had loose stools x5 since I came in at 1900. CRISTOPHER Goldsmith notified.

## 2024-08-23 VITALS
OXYGEN SATURATION: 96 % | TEMPERATURE: 98.3 F | DIASTOLIC BLOOD PRESSURE: 75 MMHG | WEIGHT: 229.7 LBS | BODY MASS INDEX: 34.02 KG/M2 | HEART RATE: 80 BPM | RESPIRATION RATE: 16 BRPM | HEIGHT: 69 IN | SYSTOLIC BLOOD PRESSURE: 140 MMHG

## 2024-08-23 LAB
ALBUMIN SERPL-MCNC: 2.6 G/DL (ref 3.4–5)
ALBUMIN/GLOB SERPL: 0.9 {RATIO} (ref 1.1–2.2)
ALP SERPL-CCNC: 73 U/L (ref 40–129)
ALT SERPL-CCNC: 43 U/L (ref 10–40)
ANION GAP SERPL CALCULATED.3IONS-SCNC: 10 MMOL/L (ref 3–16)
AST SERPL-CCNC: 29 U/L (ref 15–37)
BACTERIA BLD CULT ORG #2: NORMAL
BACTERIA BLD CULT: NORMAL
BASOPHILS # BLD: 0 K/UL (ref 0–0.2)
BASOPHILS NFR BLD: 0.3 %
BILIRUB SERPL-MCNC: 0.9 MG/DL (ref 0–1)
BUN SERPL-MCNC: 11 MG/DL (ref 7–20)
CALCIUM SERPL-MCNC: 7.7 MG/DL (ref 8.3–10.6)
CHLORIDE SERPL-SCNC: 101 MMOL/L (ref 99–110)
CO2 SERPL-SCNC: 23 MMOL/L (ref 21–32)
CREAT SERPL-MCNC: 0.9 MG/DL (ref 0.8–1.3)
DEPRECATED RDW RBC AUTO: 13.8 % (ref 12.4–15.4)
EOSINOPHIL # BLD: 0.1 K/UL (ref 0–0.6)
EOSINOPHIL NFR BLD: 0.5 %
GFR SERPLBLD CREATININE-BSD FMLA CKD-EPI: >90 ML/MIN/{1.73_M2}
GLUCOSE BLD-MCNC: 154 MG/DL (ref 70–99)
GLUCOSE BLD-MCNC: 179 MG/DL (ref 70–99)
GLUCOSE SERPL-MCNC: 215 MG/DL (ref 70–99)
HCT VFR BLD AUTO: 29.6 % (ref 40.5–52.5)
HGB BLD-MCNC: 10.1 G/DL (ref 13.5–17.5)
LYMPHOCYTES # BLD: 0.6 K/UL (ref 1–5.1)
LYMPHOCYTES NFR BLD: 5.8 %
MAGNESIUM SERPL-MCNC: 1.8 MG/DL (ref 1.8–2.4)
MCH RBC QN AUTO: 30.2 PG (ref 26–34)
MCHC RBC AUTO-ENTMCNC: 34.3 G/DL (ref 31–36)
MCV RBC AUTO: 88.1 FL (ref 80–100)
MONOCYTES # BLD: 0.8 K/UL (ref 0–1.3)
MONOCYTES NFR BLD: 8.2 %
NEUTROPHILS # BLD: 8.4 K/UL (ref 1.7–7.7)
NEUTROPHILS NFR BLD: 85.2 %
PERFORMED ON: ABNORMAL
PERFORMED ON: ABNORMAL
PLATELET # BLD AUTO: 135 K/UL (ref 135–450)
PMV BLD AUTO: 9.3 FL (ref 5–10.5)
POTASSIUM SERPL-SCNC: 3.1 MMOL/L (ref 3.5–5.1)
PROT SERPL-MCNC: 5.6 G/DL (ref 6.4–8.2)
RBC # BLD AUTO: 3.36 M/UL (ref 4.2–5.9)
SODIUM SERPL-SCNC: 134 MMOL/L (ref 136–145)
WBC # BLD AUTO: 9.9 K/UL (ref 4–11)

## 2024-08-23 PROCEDURE — 6370000000 HC RX 637 (ALT 250 FOR IP): Performed by: NURSE PRACTITIONER

## 2024-08-23 PROCEDURE — 6360000002 HC RX W HCPCS: Performed by: NURSE PRACTITIONER

## 2024-08-23 PROCEDURE — 99238 HOSP IP/OBS DSCHRG MGMT 30/<: CPT | Performed by: INTERNAL MEDICINE

## 2024-08-23 PROCEDURE — 6360000002 HC RX W HCPCS: Performed by: INTERNAL MEDICINE

## 2024-08-23 PROCEDURE — 94640 AIRWAY INHALATION TREATMENT: CPT

## 2024-08-23 PROCEDURE — 6370000000 HC RX 637 (ALT 250 FOR IP): Performed by: INTERNAL MEDICINE

## 2024-08-23 PROCEDURE — 94761 N-INVAS EAR/PLS OXIMETRY MLT: CPT

## 2024-08-23 PROCEDURE — 6370000000 HC RX 637 (ALT 250 FOR IP): Performed by: STUDENT IN AN ORGANIZED HEALTH CARE EDUCATION/TRAINING PROGRAM

## 2024-08-23 PROCEDURE — 80053 COMPREHEN METABOLIC PANEL: CPT

## 2024-08-23 PROCEDURE — 36415 COLL VENOUS BLD VENIPUNCTURE: CPT

## 2024-08-23 PROCEDURE — 85025 COMPLETE CBC W/AUTO DIFF WBC: CPT

## 2024-08-23 PROCEDURE — 2580000003 HC RX 258: Performed by: NURSE PRACTITIONER

## 2024-08-23 PROCEDURE — 83735 ASSAY OF MAGNESIUM: CPT

## 2024-08-23 RX ORDER — CLOPIDOGREL BISULFATE 75 MG/1
75 TABLET ORAL DAILY
Status: DISCONTINUED | OUTPATIENT
Start: 2024-08-23 | End: 2024-08-23 | Stop reason: HOSPADM

## 2024-08-23 RX ORDER — POTASSIUM CHLORIDE 1500 MG/1
40 TABLET, EXTENDED RELEASE ORAL ONCE
Status: COMPLETED | OUTPATIENT
Start: 2024-08-23 | End: 2024-08-23

## 2024-08-23 RX ORDER — DULOXETIN HYDROCHLORIDE 60 MG/1
60 CAPSULE, DELAYED RELEASE ORAL DAILY
Status: DISCONTINUED | OUTPATIENT
Start: 2024-08-23 | End: 2024-08-23 | Stop reason: HOSPADM

## 2024-08-23 RX ORDER — PANTOPRAZOLE SODIUM 40 MG/1
40 TABLET, DELAYED RELEASE ORAL DAILY
Qty: 30 TABLET | Refills: 0 | Status: SHIPPED | OUTPATIENT
Start: 2024-08-23

## 2024-08-23 RX ADMIN — CLOPIDOGREL BISULFATE 75 MG: 75 TABLET ORAL at 10:38

## 2024-08-23 RX ADMIN — ENOXAPARIN SODIUM 30 MG: 100 INJECTION SUBCUTANEOUS at 10:38

## 2024-08-23 RX ADMIN — Medication 1 CAPSULE: at 10:38

## 2024-08-23 RX ADMIN — IPRATROPIUM BROMIDE AND ALBUTEROL SULFATE 1 DOSE: 2.5; .5 SOLUTION RESPIRATORY (INHALATION) at 07:48

## 2024-08-23 RX ADMIN — POTASSIUM CHLORIDE 40 MEQ: 1500 TABLET, EXTENDED RELEASE ORAL at 12:50

## 2024-08-23 RX ADMIN — CEFEPIME 2000 MG: 2 INJECTION, POWDER, FOR SOLUTION INTRAVENOUS at 10:41

## 2024-08-23 RX ADMIN — DULOXETINE HYDROCHLORIDE 60 MG: 60 CAPSULE, DELAYED RELEASE ORAL at 10:38

## 2024-08-23 NOTE — PROGRESS NOTES
Discharge instructions completed. All questions were answered to patients satisfaction.   Request for transport placed, all personal belongs packed. No further needs noted.  VSS

## 2024-08-23 NOTE — FLOWSHEET NOTE
08/23/24 1030   Vital Signs   Temp 98.9 °F (37.2 °C)   Temp Source Oral   Pulse 79   Heart Rate Source Monitor   Respirations 16   /75   MAP (Calculated) 93   Patient Position Semi fowlers   Pain Assessment   Pain Assessment None - Denies Pain   Oxygen Therapy   SpO2 93 %   O2 Device None (Room air)       Shift assessment complete. See flow sheet. Scheduled meds given. See MAR.  Patients head-toe complete, VS are logged, and active bowel sound noted in all four quadrants.    Pt sitting up in chair respirations easy and unlabored. No s/s of distress. Alert and oriented denies pain or SOB pt stable    No further needs  noted at this time. Call light and bedside table are within reach. The bed is locked and is in the lowest position.        Hugo Ridley RN

## 2024-08-23 NOTE — CARE COORDINATION
CASE MANAGEMENT DISCHARGE SUMMARY      Discharge to: home    IMM given: 08/23/2024    Transportation:      Family/car: yes    Confirmed discharge plan with:     Patient: yes     Family:  no - pt to call himself     RN name: Siomara WINKLER    Note: Discharging nurse to complete DELFINO, reconcile AVS, and place final copy with patient's discharge packet. RN to ensure that written prescriptions for  Level II medications are sent with patient to the facility as per protocol.

## 2024-08-23 NOTE — DISCHARGE SUMMARY
Name:  Juan Guidry  Room:  0233/0233-01  MRN:    [id number]    Discharge Summary      This discharge summary is in conjunction with a complete physical exam done on the day of discharge.    Attending Physician: Dr. Whittaker  Discharging Physician: Dr. Langley      Admit: 8/18/2024  Discharge:  8/23/2024    HPI:  The patient is a 74-year-old  male presenting to the hospital with chief complaint of 1-day history of sudden onset of severe epigastric and periumbilical abdominal pain that started yesterday in the evening at around 8 o'clock, associated with nausea and some dry heaving without any fevers or chills. The patient notes that the pain radiated to the back and just got very severe to a point where he almost thought he was having a kidney stone. At the time of my exam, the patient complains of a very dry mouth and still states that his pain is coming back in waves. He has never experienced something like this before.      Diagnoses this Admission and Hospital Course   #Acute pancreatitis  ? Cause   -admitted to med-surg.   -NPO, IVFs. Should be able to start Clear liquids. Stopped IVF's.   -pain control: Oxy-IR prn  -antiemetics as needed.  -check Triglyceride level- normal.  -GI Consulted  MRI unremarkable   IGg4 levels normal  Repeat CT reviewed   Advance to low fat diet.  On cefepime      #Lactic acidosis  -due to above  -Trend lactic. resolved     #Elevated LFTs  -monitor LFTs.   improving     #Leukocytosis  -related to above  -NPO, IVFs.  Monitored CBC  -checked CXR, UA, stool studies.  -procal elevated.   Resolving      #Diarrhea  -send stool studies. These were normal.   -continues to have diarrhea.      #DM type 2  -monitor glucose. SSI coverage     #Hypertension  -BP improved now.  -Hydralazine prn     #COPD  -CXR today negative  -added Duonebs. (Discontinued now)     #Hyperlipidemia  -not taking statin.     #GERD  -Ion PPI     #Tobacco Dependence  -Recommended cessation     DVT  Prophylaxis: Lovenox    Procedures (Please Review Full Report for Details)  N/A    Consults    GI      Physical Exam at Discharge:    /75   Pulse 79   Temp 98.9 °F (37.2 °C) (Oral)   Resp 16   Ht 1.753 m (5' 9\")   Wt 104.2 kg (229 lb 11.2 oz)   SpO2 93%   BMI 33.92 kg/m²       Gen: No distress. Alert.   Eyes: PERRL. No sclera icterus. No conjunctival injection.   ENT: No discharge. Pharynx clear.   Neck: Trachea midline.  Resp: No accessory muscle use. No crackles. No wheezes. No rhonchi.   CV: Regular rate. Regular rhythm. No murmur. No rub. Trace edema.   Capillary Refill: Brisk,< 3 seconds   Peripheral Pulses: +2 palpable, equal bilaterally   GI: Non-tender. Non-distended.+ bowel sounds. No hernia.   Skin: Warm and dry. No nodule on exposed extremities. No rash on exposed extremities.   M/S: No cyanosis. No joint deformity. No clubbing.   Neuro: Awake. Grossly nonfocal    Psych: Oriented x 3. No anxiety or agitation    CBC:   Recent Labs     08/21/24  1548 08/22/24  0626 08/23/24  1000   WBC 17.5* 12.0* 9.9   HGB 12.2* 10.6* 10.1*   HCT 37.0* 31.5* 29.6*   MCV 88.6 88.7 88.1    126* 135     BMP:   Recent Labs     08/21/24  1548 08/22/24  0626 08/23/24  1000   * 138 134*   K 3.3* 3.6 3.1*   CL 97* 105 101   CO2 24 21 23   BUN 15 15 11   CREATININE 0.9 0.8 0.9     LIVER PROFILE:   Recent Labs     08/21/24  1548 08/22/24  0626 08/23/24  1000   AST 47* 32 29   ALT 68* 53* 43*   BILITOT 2.1* 1.2* 0.9   ALKPHOS 87 93 73     UA:  Recent Labs     08/21/24  1217   COLORU Yellow   PHUR 6.5   WBCUA 0-2   RBCUA 0-2   MUCUS Rare*   CLARITYU Clear   LEUKOCYTESUR Negative   UROBILINOGEN 0.2   BILIRUBINUR Negative   BLOODU MODERATE*   GLUCOSEU 250*       ABG    Lab Results   Component Value Date/Time    ZWS9YQG 23.7 08/21/2024 03:42 PM    BEART -0.4 08/21/2024 03:42 PM    E3ISZXBK 93.3 08/21/2024 03:42 PM    PHART 7.424 08/21/2024 03:42 PM    TJP2PGA 37.1 08/21/2024 03:42 PM    PO2ART 65.1 08/21/2024

## 2024-08-23 NOTE — DISCHARGE INSTRUCTIONS
Your information:  Name: Juan Guidry  : 1950    Your instructions:    Follow up with PCP and GI doctor    What to do after you leave the hospital:    Recommended diet: low fat, low cholesterol diet and no added salt    Recommended activity: activity as tolerated        The following personal items were collected during your admission and were returned to you:    Belongings  Dental Appliances: None  Vision - Corrective Lenses: Eyeglasses  Hearing Aid: None  Clothing: Footwear, Pants, Shirt, Undergarments  Jewelry: None  Electronic Devices: None  Weapons (Notify Protective Services/Security): None  Home Medications: None  Valuables Given To: Patient  Provide Name(s) of Who Valuable(s) Were Given To: patient    Information obtained by:  By signing below, I understand that if any problems occur once I leave the hospital I am to contact MD.  I understand and acknowledge receipt of the instructions indicated above.

## 2024-08-23 NOTE — PLAN OF CARE
Problem: Discharge Planning  Goal: Discharge to home or other facility with appropriate resources  8/22/2024 2059 by LILIBETH MARTELL  Outcome: Progressing  8/22/2024 1936 by Hugo Ridley RN  Outcome: Progressing

## 2024-08-26 LAB
BACTERIA BLD CULT ORG #2: NORMAL
BACTERIA BLD CULT: NORMAL

## 2024-09-10 ENCOUNTER — HOSPITAL ENCOUNTER (OUTPATIENT)
Age: 74
Discharge: HOME | End: 2024-09-10
Payer: MEDICARE

## 2024-09-10 PROCEDURE — 36415 COLL VENOUS BLD VENIPUNCTURE: CPT

## 2024-09-10 PROCEDURE — 87336 ENTAMOEB HIST DISPR AG IA: CPT

## 2024-09-10 PROCEDURE — 83516 IMMUNOASSAY NONANTIBODY: CPT

## 2024-09-10 PROCEDURE — 87328 CRYPTOSPORIDIUM AG IA: CPT

## 2024-09-10 PROCEDURE — 87329 GIARDIA AG IA: CPT

## 2024-09-10 PROCEDURE — 82653 EL-1 FECAL QUANTITATIVE: CPT

## 2024-09-10 PROCEDURE — 82784 ASSAY IGA/IGD/IGG/IGM EACH: CPT

## 2024-09-10 PROCEDURE — 80048 BASIC METABOLIC PNL TOTAL CA: CPT

## 2024-09-11 LAB
ANION GAP SERPL CALCULATED.3IONS-SCNC: 11 MMOL/L (ref 3–16)
BUN SERPL-MCNC: 16 MG/DL (ref 7–20)
CALCIUM SERPL-MCNC: 8.9 MG/DL (ref 8.3–10.6)
CHLORIDE SERPL-SCNC: 104 MMOL/L (ref 99–110)
CO2 SERPL-SCNC: 22 MMOL/L (ref 21–32)
CREAT SERPL-MCNC: 1.3 MG/DL (ref 0.8–1.3)
CRYPTOSP AG STL QL IA: NORMAL
E HISTOLYT AG STL QL IA: NORMAL
G LAMBLIA AG STL QL IA: NORMAL
GFR SERPLBLD CREATININE-BSD FMLA CKD-EPI: 58 ML/MIN/{1.73_M2}
GLUCOSE SERPL-MCNC: 185 MG/DL (ref 70–99)
IGA SERPL-MCNC: 407 MG/DL (ref 70–400)
POTASSIUM SERPL-SCNC: 4.3 MMOL/L (ref 3.5–5.1)
SODIUM SERPL-SCNC: 137 MMOL/L (ref 136–145)
TISSUE TRANSGLUTAMINASE IGA: <0.5 U/ML (ref 0–14)

## 2024-09-12 LAB — ELASTASE PANC STL-MCNT: 599 UG/G

## 2025-06-27 ENCOUNTER — HOSPITAL ENCOUNTER (EMERGENCY)
Age: 75
Discharge: HOME | End: 2025-06-27
Payer: MEDICARE

## 2025-06-27 VITALS
DIASTOLIC BLOOD PRESSURE: 75 MMHG | RESPIRATION RATE: 15 BRPM | BODY MASS INDEX: 32.64 KG/M2 | SYSTOLIC BLOOD PRESSURE: 150 MMHG | WEIGHT: 221 LBS | OXYGEN SATURATION: 99 % | HEART RATE: 74 BPM | TEMPERATURE: 97.9 F

## 2025-06-27 DIAGNOSIS — R19.7 DIARRHEA, UNSPECIFIED TYPE: Primary | ICD-10-CM

## 2025-06-27 LAB
ALBUMIN SERPL-MCNC: 4.2 G/DL (ref 3.4–5)
ALBUMIN/GLOB SERPL: 1.1 {RATIO} (ref 1.1–2.2)
ALP SERPL-CCNC: 98 U/L (ref 40–129)
ALT SERPL-CCNC: 25 U/L (ref 10–40)
ALT SERPL-CCNC: ABNORMAL U/L (ref 10–40)
ANION GAP SERPL CALCULATED.3IONS-SCNC: 12 MMOL/L (ref 3–16)
AST SERPL-CCNC: 71 U/L (ref 15–37)
BACTERIA URNS QL MICRO: ABNORMAL /HPF
BASOPHILS # BLD: 0 K/UL (ref 0–0.2)
BASOPHILS NFR BLD: 0.3 %
BILIRUB SERPL-MCNC: 1.5 MG/DL (ref 0–1)
BILIRUB UR QL STRIP.AUTO: ABNORMAL
BUN SERPL-MCNC: 14 MG/DL (ref 7–20)
C DIFF TOX A+B STL QL IA: NORMAL
CALCIUM SERPL-MCNC: 10 MG/DL (ref 8.3–10.6)
CHLORIDE SERPL-SCNC: 101 MMOL/L (ref 99–110)
CLARITY UR: CLEAR
CO2 SERPL-SCNC: 24 MMOL/L (ref 21–32)
COARSE GRAN CASTS #/AREA URNS LPF: ABNORMAL /LPF (ref 0–2)
COLOR UR: ABNORMAL
CREAT SERPL-MCNC: 1.5 MG/DL (ref 0.8–1.3)
DEPRECATED RDW RBC AUTO: 13.7 % (ref 12.4–15.4)
EOSINOPHIL # BLD: 0.2 K/UL (ref 0–0.6)
EOSINOPHIL NFR BLD: 1.3 %
EPI CELLS #/AREA URNS HPF: ABNORMAL /HPF (ref 0–5)
GFR SERPLBLD CREATININE-BSD FMLA CKD-EPI: 49 ML/MIN/{1.73_M2}
GLUCOSE SERPL-MCNC: 146 MG/DL (ref 70–99)
GLUCOSE UR STRIP.AUTO-MCNC: NEGATIVE MG/DL
HCT VFR BLD AUTO: 44.6 % (ref 40.5–52.5)
HGB BLD-MCNC: 15.2 G/DL (ref 13.5–17.5)
HGB UR QL STRIP.AUTO: NEGATIVE
KETONES UR STRIP.AUTO-MCNC: NEGATIVE MG/DL
LEUKOCYTE ESTERASE UR QL STRIP.AUTO: NEGATIVE
LIPASE SERPL-CCNC: 44 U/L (ref 13–60)
LYMPHOCYTES # BLD: 1.7 K/UL (ref 1–5.1)
LYMPHOCYTES NFR BLD: 13.1 %
MCH RBC QN AUTO: 29.6 PG (ref 26–34)
MCHC RBC AUTO-ENTMCNC: 34 G/DL (ref 31–36)
MCV RBC AUTO: 87.2 FL (ref 80–100)
MONOCYTES # BLD: 0.9 K/UL (ref 0–1.3)
MONOCYTES NFR BLD: 7 %
MUCOUS THREADS #/AREA URNS LPF: ABNORMAL /LPF
NEUTROPHILS # BLD: 10.3 K/UL (ref 1.7–7.7)
NEUTROPHILS NFR BLD: 78.3 %
NITRITE UR QL STRIP.AUTO: NEGATIVE
PH UR STRIP.AUTO: 6 [PH] (ref 5–8)
PLATELET # BLD AUTO: 202 K/UL (ref 135–450)
PMV BLD AUTO: 11.8 FL (ref 5–10.5)
POTASSIUM SERPL-SCNC: 4.1 MMOL/L (ref 3.5–5.1)
POTASSIUM SERPL-SCNC: ABNORMAL MMOL/L (ref 3.5–5.1)
PROT SERPL-MCNC: 7.9 G/DL (ref 6.4–8.2)
PROT UR STRIP.AUTO-MCNC: 30 MG/DL
RBC # BLD AUTO: 5.12 M/UL (ref 4.2–5.9)
RBC #/AREA URNS HPF: ABNORMAL /HPF (ref 0–4)
REASON FOR REJECTION: NORMAL
REJECTED TEST: NORMAL
SODIUM SERPL-SCNC: 137 MMOL/L (ref 136–145)
SP GR UR STRIP.AUTO: >=1.03 (ref 1–1.03)
UA COMPLETE W REFLEX CULTURE PNL UR: YES
UA DIPSTICK W REFLEX MICRO PNL UR: YES
URN SPEC COLLECT METH UR: ABNORMAL
UROBILINOGEN UR STRIP-ACNC: 0.2 E.U./DL
WBC # BLD AUTO: 13.1 K/UL (ref 4–11)
WBC #/AREA URNS HPF: ABNORMAL /HPF (ref 0–5)

## 2025-06-27 PROCEDURE — 87086 URINE CULTURE/COLONY COUNT: CPT

## 2025-06-27 PROCEDURE — 87329 GIARDIA AG IA: CPT

## 2025-06-27 PROCEDURE — 81001 URINALYSIS AUTO W/SCOPE: CPT

## 2025-06-27 PROCEDURE — 87449 NOS EACH ORGANISM AG IA: CPT

## 2025-06-27 PROCEDURE — 87425 ROTAVIRUS AG IA: CPT

## 2025-06-27 PROCEDURE — 2580000003 HC RX 258: Performed by: NURSE PRACTITIONER

## 2025-06-27 PROCEDURE — 87324 CLOSTRIDIUM AG IA: CPT

## 2025-06-27 PROCEDURE — 6370000000 HC RX 637 (ALT 250 FOR IP): Performed by: NURSE PRACTITIONER

## 2025-06-27 PROCEDURE — 80053 COMPREHEN METABOLIC PANEL: CPT

## 2025-06-27 PROCEDURE — 83690 ASSAY OF LIPASE: CPT

## 2025-06-27 PROCEDURE — 85025 COMPLETE CBC W/AUTO DIFF WBC: CPT

## 2025-06-27 PROCEDURE — 99284 EMERGENCY DEPT VISIT MOD MDM: CPT

## 2025-06-27 RX ORDER — 0.9 % SODIUM CHLORIDE 0.9 %
1000 INTRAVENOUS SOLUTION INTRAVENOUS ONCE
Status: COMPLETED | OUTPATIENT
Start: 2025-06-27 | End: 2025-06-27

## 2025-06-27 RX ORDER — LOPERAMIDE HYDROCHLORIDE 2 MG/1
4 CAPSULE ORAL ONCE
Status: COMPLETED | OUTPATIENT
Start: 2025-06-27 | End: 2025-06-27

## 2025-06-27 RX ADMIN — LOPERAMIDE HYDROCHLORIDE 4 MG: 2 CAPSULE ORAL at 19:37

## 2025-06-27 RX ADMIN — SODIUM CHLORIDE 1000 ML: 0.9 INJECTION, SOLUTION INTRAVENOUS at 17:37

## 2025-06-27 ASSESSMENT — PAIN - FUNCTIONAL ASSESSMENT: PAIN_FUNCTIONAL_ASSESSMENT: NONE - DENIES PAIN

## 2025-06-27 NOTE — ED PROVIDER NOTES
[facility] EMERGENCY DEPARTMENT  EMERGENCY DEPARTMENT ENCOUNTER        Pt Name: Juan Guidry  MRN: [id number]  Birthdate 1950  Date of evaluation: 6/27/2025  Provider: INÉS Clayton - CNP  PCP: Zeferino Carson III, MD  Note Started: 5:04 PM EDT 6/27/25      CJ. I have evaluated this patient.        CHIEF COMPLAINT       Chief Complaint   Patient presents with    Diarrhea     Pt reports diarrhea since this AM, pt states he might be dehydrated. Pt also reports one episode of emesis with some nausea.        HISTORY OF PRESENT ILLNESS: 1 or more Elements     History From: Patient     Limitations to history : None    Social Determinants Significantly Affecting Health : None    Chief Complaint: Diarrhea     Juan Guidry is a 75 y.o. male who presents to the emergency department today with symptoms of loose stool, nausea and diarrhea.  States that he started with symptoms approximately this morning upon wakening.  Reported 1 episode of vomiting earlier today.  Reported multiple episodes of loose appearing watery stool.  Denies any blood in stool.  Denies any black appearing stools.  No abdominal discomfort.  No other acute concerns.    Nursing Notes were all reviewed and agreed with or any disagreements were addressed in the HPI.    REVIEW OF SYSTEMS :      Review of Systems    Positives and Pertinent negatives as per HPI.     SURGICAL HISTORY     Past Surgical History:   Procedure Laterality Date    NOSE SURGERY      SALPINGO-OOPHORECTOMY      UPPER GASTROINTESTINAL ENDOSCOPY N/A 9/10/2021    EGD BIOPSY performed by Marcos LUJAN MD at [facility] ENDOSCOPY    WISDOM TOOTH EXTRACTION         CURRENTMEDICATIONS       Previous Medications    BUDESONIDE-FORMOTEROL (SYMBICORT) 80-4.5 MCG/ACT AERO    Inhale 1 puff into the lungs    CLOPIDOGREL (PLAVIX) 75 MG TABLET    TAKE 1 TAB BY MOUTH DAILY.    DULOXETINE (CYMBALTA) 60 MG EXTENDED RELEASE CAPSULE    TAKE ONE CAPSULE BY MOUTH EVERY DAY      DISPOSITION/PLAN     DISPOSITION Decision To Discharge 06/27/2025 08:10:41 PM   DISPOSITION CONDITION Stable           PATIENT REFERRED TO:  Zeferino Carson III, MD  [address]  [facility] [id number] [phone]    Schedule an appointment as soon as possible for a visit       [facility] Emergency Department  [address] [place] [address]  [phone]  Go to   If symptoms worsen      DISCHARGE MEDICATIONS:  Discharge Medication List as of 6/27/2025  8:11 PM          DISCONTINUED MEDICATIONS:  Discharge Medication List as of 6/27/2025  8:11 PM                 (Please note that portions of this note were completed with a voice recognition program.  Efforts were made to edit the dictations but occasionally words are mis-transcribed.)    INÉS Clayton CNP (electronically signed)      Lamine Servin APRN - CNP  07/02/25 121

## 2025-06-28 LAB
BACTERIA UR CULT: NORMAL
G LAMBLIA AG STL QL IA: NORMAL

## 2025-06-30 LAB — RV AG STL QL IA: NEGATIVE

## (undated) DEVICE — FORCEPS BX L240CM JAW DIA2.8MM L CAP W/ NDL MIC MESH TOOTH